# Patient Record
Sex: FEMALE | Race: WHITE | NOT HISPANIC OR LATINO | URBAN - METROPOLITAN AREA
[De-identification: names, ages, dates, MRNs, and addresses within clinical notes are randomized per-mention and may not be internally consistent; named-entity substitution may affect disease eponyms.]

---

## 2018-09-20 DIAGNOSIS — Z12.11 SCREENING FOR COLON CANCER: Primary | ICD-10-CM

## 2018-09-27 ENCOUNTER — LAB REQUISITION (OUTPATIENT)
Dept: LAB | Facility: HOSPITAL | Age: 78
End: 2018-09-27

## 2018-09-27 ENCOUNTER — OUTSIDE FACILITY SERVICE (OUTPATIENT)
Dept: GASTROENTEROLOGY | Facility: CLINIC | Age: 78
End: 2018-09-27

## 2018-09-27 DIAGNOSIS — Z12.11 ENCOUNTER FOR SCREENING FOR MALIGNANT NEOPLASM OF COLON: ICD-10-CM

## 2018-09-27 DIAGNOSIS — Z86.010 HISTORY OF COLONIC POLYPS: ICD-10-CM

## 2018-09-27 DIAGNOSIS — D12.2 BENIGN NEOPLASM OF ASCENDING COLON: ICD-10-CM

## 2018-09-27 DIAGNOSIS — K57.30 DIVERTICULOSIS OF LARGE INTESTINE WITHOUT PERFORATION OR ABSCESS WITHOUT BLEEDING: ICD-10-CM

## 2018-09-27 PROCEDURE — 45385 COLONOSCOPY W/LESION REMOVAL: CPT | Performed by: INTERNAL MEDICINE

## 2018-09-27 PROCEDURE — 45388 COLONOSCOPY W/ABLATION: CPT | Performed by: INTERNAL MEDICINE

## 2018-09-27 PROCEDURE — 88305 TISSUE EXAM BY PATHOLOGIST: CPT | Performed by: INTERNAL MEDICINE

## 2018-09-28 LAB
CYTO UR: NORMAL
LAB AP CASE REPORT: NORMAL
LAB AP CLINICAL INFORMATION: NORMAL
PATH REPORT.FINAL DX SPEC: NORMAL
PATH REPORT.GROSS SPEC: NORMAL

## 2021-03-24 ENCOUNTER — OFFICE VISIT (OUTPATIENT)
Dept: FAMILY MEDICINE CLINIC | Facility: CLINIC | Age: 81
End: 2021-03-24

## 2021-03-24 VITALS
OXYGEN SATURATION: 96 % | DIASTOLIC BLOOD PRESSURE: 74 MMHG | TEMPERATURE: 97.8 F | BODY MASS INDEX: 36.07 KG/M2 | WEIGHT: 203.6 LBS | HEART RATE: 74 BPM | HEIGHT: 63 IN | RESPIRATION RATE: 18 BRPM | SYSTOLIC BLOOD PRESSURE: 118 MMHG

## 2021-03-24 DIAGNOSIS — R60.0 LOCALIZED EDEMA: ICD-10-CM

## 2021-03-24 DIAGNOSIS — G89.29 CHRONIC MIDLINE LOW BACK PAIN WITHOUT SCIATICA: ICD-10-CM

## 2021-03-24 DIAGNOSIS — R06.00 DYSPNEA, UNSPECIFIED TYPE: ICD-10-CM

## 2021-03-24 DIAGNOSIS — I26.99 PULMONARY EMBOLISM, UNSPECIFIED CHRONICITY, UNSPECIFIED PULMONARY EMBOLISM TYPE, UNSPECIFIED WHETHER ACUTE COR PULMONALE PRESENT (HCC): ICD-10-CM

## 2021-03-24 DIAGNOSIS — M54.50 CHRONIC MIDLINE LOW BACK PAIN WITHOUT SCIATICA: Primary | ICD-10-CM

## 2021-03-24 DIAGNOSIS — I10 ESSENTIAL HYPERTENSION: ICD-10-CM

## 2021-03-24 DIAGNOSIS — F51.04 CHRONIC INSOMNIA: ICD-10-CM

## 2021-03-24 DIAGNOSIS — M54.50 CHRONIC MIDLINE LOW BACK PAIN WITHOUT SCIATICA: ICD-10-CM

## 2021-03-24 DIAGNOSIS — N39.0 CHRONIC UTI: ICD-10-CM

## 2021-03-24 DIAGNOSIS — G89.29 CHRONIC MIDLINE LOW BACK PAIN WITHOUT SCIATICA: Primary | ICD-10-CM

## 2021-03-24 PROBLEM — K21.9 GASTROESOPHAGEAL REFLUX DISEASE WITHOUT ESOPHAGITIS: Status: ACTIVE | Noted: 2019-03-20

## 2021-03-24 PROBLEM — G47.00 INSOMNIA: Status: ACTIVE | Noted: 2019-03-20

## 2021-03-24 PROBLEM — G63 NEUROPATHY DUE TO PERIPHERAL VASCULAR DISEASE: Status: ACTIVE | Noted: 2019-03-20

## 2021-03-24 PROBLEM — E78.2 MIXED HYPERLIPIDEMIA: Status: ACTIVE | Noted: 2019-03-20

## 2021-03-24 PROBLEM — I26.09 OTHER PULMONARY EMBOLISM WITH ACUTE COR PULMONALE: Status: ACTIVE | Noted: 2019-03-20

## 2021-03-24 PROBLEM — I73.9 NEUROPATHY DUE TO PERIPHERAL VASCULAR DISEASE: Status: ACTIVE | Noted: 2019-03-20

## 2021-03-24 PROBLEM — F32.0 CURRENT MILD EPISODE OF MAJOR DEPRESSIVE DISORDER: Status: ACTIVE | Noted: 2019-03-20

## 2021-03-24 LAB
BILIRUB BLD-MCNC: NEGATIVE MG/DL
CLARITY, POC: CLEAR
COLOR UR: YELLOW
GLUCOSE UR STRIP-MCNC: NEGATIVE MG/DL
KETONES UR QL: NEGATIVE
LEUKOCYTE EST, POC: NEGATIVE
NITRITE UR-MCNC: NEGATIVE MG/ML
PH UR: 6 [PH] (ref 5–8)
PROT UR STRIP-MCNC: NEGATIVE MG/DL
RBC # UR STRIP: NEGATIVE /UL
SP GR UR: 1.01 (ref 1–1.03)
UROBILINOGEN UR QL: NORMAL

## 2021-03-24 PROCEDURE — 81003 URINALYSIS AUTO W/O SCOPE: CPT | Performed by: FAMILY MEDICINE

## 2021-03-24 PROCEDURE — 99204 OFFICE O/P NEW MOD 45 MIN: CPT | Performed by: FAMILY MEDICINE

## 2021-03-24 RX ORDER — TIZANIDINE 2 MG/1
2 TABLET ORAL EVERY 8 HOURS PRN
Qty: 60 TABLET | Refills: 0 | Status: SHIPPED | OUTPATIENT
Start: 2021-03-24 | End: 2021-06-30

## 2021-03-24 RX ORDER — BRIMONIDINE TARTRATE/TIMOLOL 0.2%-0.5%
DROPS OPHTHALMIC (EYE)
COMMUNITY
Start: 2021-01-17

## 2021-03-24 RX ORDER — OMEPRAZOLE 40 MG/1
40 CAPSULE, DELAYED RELEASE ORAL EVERY MORNING
COMMUNITY
Start: 2021-03-09 | End: 2021-06-25 | Stop reason: SDUPTHER

## 2021-03-24 RX ORDER — SIMVASTATIN 20 MG
TABLET ORAL
COMMUNITY
Start: 2020-11-05 | End: 2022-07-11

## 2021-03-24 RX ORDER — TIZANIDINE 2 MG/1
2 TABLET ORAL EVERY 8 HOURS PRN
Qty: 60 TABLET | Refills: 0 | Status: SHIPPED | OUTPATIENT
Start: 2021-03-24 | End: 2021-03-24 | Stop reason: SDUPTHER

## 2021-03-24 RX ORDER — ZOLPIDEM TARTRATE 10 MG/1
10 TABLET ORAL
COMMUNITY
End: 2021-03-24 | Stop reason: SDUPTHER

## 2021-03-24 RX ORDER — MEMANTINE HYDROCHLORIDE 10 MG/1
TABLET ORAL
COMMUNITY
Start: 2020-12-03

## 2021-03-24 RX ORDER — FLUTICASONE PROPIONATE 50 MCG
2 SPRAY, SUSPENSION (ML) NASAL DAILY
COMMUNITY
Start: 2021-03-04 | End: 2021-05-13 | Stop reason: SDUPTHER

## 2021-03-24 RX ORDER — LATANOPROST 50 UG/ML
SOLUTION/ DROPS OPHTHALMIC
COMMUNITY
Start: 2020-11-18

## 2021-03-24 RX ORDER — ZOLPIDEM TARTRATE 5 MG/1
5 TABLET ORAL NIGHTLY PRN
Qty: 30 TABLET | Refills: 1 | Status: SHIPPED | OUTPATIENT
Start: 2021-03-24 | End: 2021-06-14 | Stop reason: SDUPTHER

## 2021-03-24 RX ORDER — LISINOPRIL 10 MG/1
10 TABLET ORAL DAILY
COMMUNITY
End: 2021-11-10 | Stop reason: SDUPTHER

## 2021-03-24 RX ORDER — POLYMYXIN B SULFATE AND TRIMETHOPRIM 1; 10000 MG/ML; [USP'U]/ML
SOLUTION OPHTHALMIC
COMMUNITY
Start: 2021-01-25

## 2021-03-24 RX ORDER — GALANTAMINE HYDROBROMIDE 8 MG/1
8 TABLET, FILM COATED ORAL
COMMUNITY

## 2021-03-24 RX ORDER — GABAPENTIN 100 MG/1
CAPSULE ORAL
COMMUNITY
Start: 2021-01-25 | End: 2021-06-02 | Stop reason: SDUPTHER

## 2021-03-24 RX ORDER — EZETIMIBE 10 MG/1
TABLET ORAL
COMMUNITY
Start: 2020-11-02 | End: 2021-06-25 | Stop reason: SDUPTHER

## 2021-03-24 RX ORDER — ZOLPIDEM TARTRATE 5 MG/1
5 TABLET ORAL NIGHTLY PRN
Qty: 30 TABLET | Refills: 1 | Status: SHIPPED | OUTPATIENT
Start: 2021-03-24 | End: 2021-03-24 | Stop reason: SDUPTHER

## 2021-03-24 RX ORDER — DULOXETIN HYDROCHLORIDE 60 MG/1
60 CAPSULE, DELAYED RELEASE ORAL DAILY
COMMUNITY
End: 2021-03-29 | Stop reason: SDUPTHER

## 2021-03-24 RX ORDER — PREDNISOLONE ACETATE 10 MG/ML
SUSPENSION/ DROPS OPHTHALMIC
COMMUNITY
Start: 2021-03-10

## 2021-03-24 RX ORDER — NITROFURANTOIN 25; 75 MG/1; MG/1
100 CAPSULE ORAL
COMMUNITY
End: 2021-05-14 | Stop reason: SDUPTHER

## 2021-03-24 NOTE — PROGRESS NOTES
Chief Complaint  Leg Swelling (bilateral ) and Foot Swelling (bilateral )    Subjective          Annalee Melara presents to [facility]  Had been seeing Dr. Rutherford until he passed away.   Switched to Dr. Nobles after that did not work out for her.   Had some swelling in her legs that started 2 weeks ago.  Was given Meloxicam for her back pain just a few days prior.  Ended up seeing the PA at the office and she gave her a few days of lasix.   The legs got better some after that.  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   She should be having a procedure with Dr. Cano.  She does wear her support hose every day.  She has had a little shortness of breath but nothing severe.  She had a PE in the past couple years and was told she had to be on Xarelto for life.  She has been taking it every day.  When they were working up her swelling in her legs, the PA at [facility] ordered a chest x-ray and they told her she had COPD.  She is never smoked but did work in an office with smokers for many years.  She denies any history of kidney disease or thyroid disease.  She has had some decreased urinary output over the past week.  Also needs a refill of her Ambien.  She states she is not taking the 10 mg as listed but is taking a half a pill at night.  She states that the only thing that is ever helped her in the past for sleep but nobody is ever tried anything else or try to get her off of it.  Patient also has never been taken off her statin by any of her previous providers even though she is 80 and never had a heart attack stroke or been diagnosed with any peripheral vascular disease.  She has chronic urinary tract infections and has been taking Macrobid for that for a while.  They tried other medications but that was only thing that worked because of the sensitivities.  Last time she was out of it for 5 days she got a UTI again.  She has chronic low back pain.  That is why the meloxicam was  "tried in the first place.  She is wondering what else she can do.  Is been a long time she since she has had x-rays are done physical therapy.      Objective   Vital Signs:   /74 (BP Location: Left arm, Patient Position: Sitting, Cuff Size: Adult)   Pulse 74   Temp 97.8 °F (36.6 °C) (Temporal)   Resp 18   Ht 160 cm (63\")   Wt 92.4 kg (203 lb 9.6 oz)   SpO2 96%   BMI 36.07 kg/m²     Physical Exam  Vitals and nursing note reviewed.   Constitutional:       General: She is not in acute distress.     Appearance: Normal appearance. She is well-developed.   HENT:      Head: Normocephalic and atraumatic.      Right Ear: Tympanic membrane, ear canal and external ear normal.      Left Ear: Tympanic membrane, ear canal and external ear normal.      Nose: Nose normal.      Mouth/Throat:      Mouth: Mucous membranes are moist.      Pharynx: Oropharynx is clear. No oropharyngeal exudate or posterior oropharyngeal erythema.   Eyes:      Conjunctiva/sclera: Conjunctivae normal.      Pupils: Pupils are equal, round, and reactive to light.   Neck:      Thyroid: No thyromegaly.   Cardiovascular:      Rate and Rhythm: Normal rate and regular rhythm.      Heart sounds: No murmur heard.     Pulmonary:      Effort: Pulmonary effort is normal.      Breath sounds: Normal breath sounds. No wheezing.   Abdominal:      General: Abdomen is flat. Bowel sounds are normal. There is no distension.      Palpations: Abdomen is soft. There is no mass.      Tenderness: There is no abdominal tenderness.      Hernia: No hernia is present.   Musculoskeletal:         General: No swelling. Normal range of motion.      Cervical back: Normal range of motion and neck supple.      Right lower leg: Edema present.      Left lower leg: Edema present.      Comments: Trace bilateral lower extremity edema with tight support hose in place   Lymphadenopathy:      Cervical: No cervical adenopathy.   Skin:     General: Skin is warm and dry.      Capillary " Refill: Capillary refill takes less than 2 seconds.      Findings: No rash.   Neurological:      General: No focal deficit present.      Mental Status: She is alert and oriented to person, place, and time.      Cranial Nerves: No cranial nerve deficit.   Psychiatric:         Mood and Affect: Mood normal.         Behavior: Behavior normal.        Result Review :   The following data was reviewed by: Meredith Lea Kehrer, MD on 03/24/2021:  UA    Urinalysis 3/24/21   Ketones, UA Negative   Leukocytes, UA Negative                     Assessment and Plan    Diagnoses and all orders for this visit:    1. Chronic midline low back pain without sciatica (Primary)  -     Discontinue: tiZANidine (ZANAFLEX) 2 MG tablet; Take 1 tablet by mouth Every 8 (Eight) Hours As Needed for Muscle Spasms (back).  Dispense: 60 tablet; Refill: 0    2. Localized edema  -     BNP  -     CBC & Differential  -     Comprehensive Metabolic Panel  -     TSH  -     POC Urinalysis Dipstick, Automated    3. Chronic insomnia  -     Ambulatory Referral to Sleep Medicine  -     Discontinue: zolpidem (AMBIEN) 5 MG tablet; Take 1 tablet by mouth At Night As Needed for Sleep.  Dispense: 30 tablet; Refill: 1    4. Essential hypertension    5. Chronic UTI    6. Pulmonary embolism, unspecified chronicity, unspecified pulmonary embolism type, unspecified whether acute cor pulmonale present (CMS/HCC)  -     Discontinue: rivaroxaban (XARELTO) 20 MG tablet; Take 1 tablet by mouth Daily.  Dispense: 90 tablet; Refill: 1    7. Dyspnea, unspecified type   -     BNP    Chronic low back pain-we will do a trial of a muscle relaxer  Edema-check labs as listed, continue support hose  Chronic insomnia-patient agrees to see sleep medicine, I agreed to give her 5 mg Ambien only for a couple months until she does that and then I will not prescribe this medication for her long-term.  Hypertension-controlled, continue current medication  Chronic UTI-no evidence of UTI at  present  History of PE-continue Xarelto until records are reviewed.    Follow Up   No follow-ups on file.  Patient was given instructions and counseling regarding her condition or for health maintenance advice. Please see specific information pulled into the AVS if appropriate.

## 2021-03-25 LAB
ALBUMIN SERPL-MCNC: 4.1 G/DL (ref 3.7–4.7)
ALBUMIN/GLOB SERPL: 1.8 {RATIO} (ref 1.2–2.2)
ALP SERPL-CCNC: 109 IU/L (ref 39–117)
ALT SERPL-CCNC: 14 IU/L (ref 0–32)
AST SERPL-CCNC: 22 IU/L (ref 0–40)
BASOPHILS # BLD AUTO: 0.1 X10E3/UL (ref 0–0.2)
BASOPHILS NFR BLD AUTO: 1 %
BILIRUB SERPL-MCNC: 0.3 MG/DL (ref 0–1.2)
BNP SERPL-MCNC: 11.5 PG/ML (ref 0–100)
BUN SERPL-MCNC: 18 MG/DL (ref 8–27)
BUN/CREAT SERPL: 27 (ref 12–28)
CALCIUM SERPL-MCNC: 9.2 MG/DL (ref 8.7–10.3)
CHLORIDE SERPL-SCNC: 102 MMOL/L (ref 96–106)
CO2 SERPL-SCNC: 25 MMOL/L (ref 20–29)
CREAT SERPL-MCNC: 0.66 MG/DL (ref 0.57–1)
EOSINOPHIL # BLD AUTO: 0.3 X10E3/UL (ref 0–0.4)
EOSINOPHIL NFR BLD AUTO: 4 %
ERYTHROCYTE [DISTWIDTH] IN BLOOD BY AUTOMATED COUNT: 12.5 % (ref 11.7–15.4)
GLOBULIN SER CALC-MCNC: 2.3 G/DL (ref 1.5–4.5)
GLUCOSE SERPL-MCNC: 91 MG/DL (ref 65–99)
HCT VFR BLD AUTO: 41.5 % (ref 34–46.6)
HGB BLD-MCNC: 13.6 G/DL (ref 11.1–15.9)
IMM GRANULOCYTES # BLD AUTO: 0 X10E3/UL (ref 0–0.1)
IMM GRANULOCYTES NFR BLD AUTO: 0 %
LYMPHOCYTES # BLD AUTO: 1.8 X10E3/UL (ref 0.7–3.1)
LYMPHOCYTES NFR BLD AUTO: 26 %
MCH RBC QN AUTO: 30.4 PG (ref 26.6–33)
MCHC RBC AUTO-ENTMCNC: 32.8 G/DL (ref 31.5–35.7)
MCV RBC AUTO: 93 FL (ref 79–97)
MONOCYTES # BLD AUTO: 0.7 X10E3/UL (ref 0.1–0.9)
MONOCYTES NFR BLD AUTO: 9 %
NEUTROPHILS # BLD AUTO: 4.3 X10E3/UL (ref 1.4–7)
NEUTROPHILS NFR BLD AUTO: 60 %
PLATELET # BLD AUTO: 304 X10E3/UL (ref 150–450)
POTASSIUM SERPL-SCNC: 4.9 MMOL/L (ref 3.5–5.2)
PROT SERPL-MCNC: 6.4 G/DL (ref 6–8.5)
RBC # BLD AUTO: 4.47 X10E6/UL (ref 3.77–5.28)
SODIUM SERPL-SCNC: 139 MMOL/L (ref 134–144)
TSH SERPL DL<=0.005 MIU/L-ACNC: 0.82 UIU/ML (ref 0.45–4.5)
WBC # BLD AUTO: 7.1 X10E3/UL (ref 3.4–10.8)

## 2021-03-26 ENCOUNTER — TELEPHONE (OUTPATIENT)
Dept: FAMILY MEDICINE CLINIC | Facility: CLINIC | Age: 81
End: 2021-03-26

## 2021-03-26 NOTE — TELEPHONE ENCOUNTER
pt stated the muscle relaxer is wearing off before they are supposed to.  Pt stated she would like to see physical therapy for her back. She doesn't want to see [facility], she has seen them and wasn't satisfied.  Stated she has been wearing a back brace.

## 2021-03-29 DIAGNOSIS — M54.50 CHRONIC MIDLINE LOW BACK PAIN WITHOUT SCIATICA: Primary | ICD-10-CM

## 2021-03-29 DIAGNOSIS — G89.29 CHRONIC MIDLINE LOW BACK PAIN WITHOUT SCIATICA: Primary | ICD-10-CM

## 2021-03-29 DIAGNOSIS — R60.0 LOCALIZED EDEMA: Primary | ICD-10-CM

## 2021-03-29 RX ORDER — FUROSEMIDE 20 MG/1
20 TABLET ORAL DAILY PRN
Qty: 30 TABLET | Refills: 0 | Status: SHIPPED | OUTPATIENT
Start: 2021-03-29 | End: 2021-04-06 | Stop reason: DRUGHIGH

## 2021-03-29 RX ORDER — DULOXETIN HYDROCHLORIDE 60 MG/1
60 CAPSULE, DELAYED RELEASE ORAL DAILY
Qty: 30 CAPSULE | Refills: 2 | Status: SHIPPED | OUTPATIENT
Start: 2021-03-29 | End: 2021-06-30 | Stop reason: DRUGHIGH

## 2021-04-01 ENCOUNTER — TELEPHONE (OUTPATIENT)
Dept: FAMILY MEDICINE CLINIC | Facility: CLINIC | Age: 81
End: 2021-04-01

## 2021-04-01 NOTE — TELEPHONE ENCOUNTER
Pt called in and wanted you to know she had two kyphoplasty and she wanted to know if you wanted to do a xray before you did physical therapy. Please advise..

## 2021-04-01 NOTE — TELEPHONE ENCOUNTER
We can get a lumbar x-ray first.  Without acute worsening of pain or injury, I do not think she is gotten a new compression fracture but with her history it is okay to make sure.  Where does she want to have it done so I can order it?

## 2021-04-02 ENCOUNTER — TELEPHONE (OUTPATIENT)
Dept: FAMILY MEDICINE CLINIC | Facility: CLINIC | Age: 81
End: 2021-04-02

## 2021-04-02 DIAGNOSIS — G89.29 CHRONIC MIDLINE LOW BACK PAIN WITHOUT SCIATICA: Primary | ICD-10-CM

## 2021-04-02 DIAGNOSIS — S32.000S COMPRESSION FRACTURE OF LUMBAR VERTEBRA, UNSPECIFIED LUMBAR VERTEBRAL LEVEL, SEQUELA: ICD-10-CM

## 2021-04-02 DIAGNOSIS — M54.50 CHRONIC MIDLINE LOW BACK PAIN WITHOUT SCIATICA: Primary | ICD-10-CM

## 2021-04-06 ENCOUNTER — TELEPHONE (OUTPATIENT)
Dept: FAMILY MEDICINE CLINIC | Facility: CLINIC | Age: 81
End: 2021-04-06

## 2021-04-06 ENCOUNTER — OFFICE VISIT (OUTPATIENT)
Dept: FAMILY MEDICINE CLINIC | Facility: CLINIC | Age: 81
End: 2021-04-06

## 2021-04-06 VITALS
DIASTOLIC BLOOD PRESSURE: 80 MMHG | HEART RATE: 81 BPM | BODY MASS INDEX: 35.61 KG/M2 | WEIGHT: 201 LBS | OXYGEN SATURATION: 96 % | SYSTOLIC BLOOD PRESSURE: 132 MMHG | TEMPERATURE: 96.7 F

## 2021-04-06 DIAGNOSIS — M54.50 CHRONIC MIDLINE LOW BACK PAIN WITHOUT SCIATICA: ICD-10-CM

## 2021-04-06 DIAGNOSIS — G89.29 CHRONIC MIDLINE LOW BACK PAIN WITHOUT SCIATICA: ICD-10-CM

## 2021-04-06 DIAGNOSIS — I87.8 VENOUS STASIS: ICD-10-CM

## 2021-04-06 DIAGNOSIS — S32.000S COMPRESSION FRACTURE OF LUMBAR VERTEBRA, UNSPECIFIED LUMBAR VERTEBRAL LEVEL, SEQUELA: ICD-10-CM

## 2021-04-06 DIAGNOSIS — R60.0 LOCALIZED EDEMA: Primary | ICD-10-CM

## 2021-04-06 PROCEDURE — 99214 OFFICE O/P EST MOD 30 MIN: CPT | Performed by: FAMILY MEDICINE

## 2021-04-06 RX ORDER — POTASSIUM CHLORIDE 750 MG/1
10 TABLET, FILM COATED, EXTENDED RELEASE ORAL DAILY
Qty: 30 TABLET | Refills: 0 | Status: SHIPPED | OUTPATIENT
Start: 2021-04-06 | End: 2021-04-16 | Stop reason: SDUPTHER

## 2021-04-06 RX ORDER — FUROSEMIDE 40 MG/1
40 TABLET ORAL DAILY
Qty: 30 TABLET | Refills: 0 | Status: SHIPPED | OUTPATIENT
Start: 2021-04-06 | End: 2021-04-16 | Stop reason: SDUPTHER

## 2021-04-06 NOTE — PROGRESS NOTES
Chief Complaint  Edema (Both Legs)    Subjective          Annalee Melara presents to [facility]  Patient presents to follow-up her lower extremity edema.  She is not any better with the 20 mg furosemide.  She states that she was given the support hose by Dr. Cano in January.  She really feels her swelling is still a whole lot worse since taking the meloxicam about a month ago.  I got her records from [facility].  There initial work-up was appropriate and did not reveal any problems either.  Her labs with me show normal thyroid renal function and rule out congestive heart failure.  She denies any side effects from the low-dose of furosemide over the past few days.  She is not wearing her support hose so I can see her legs but she does wear them all the time except when she is in bed.  She knows also to keep her legs up.  She had wraps years ago for lymphedema.  She knows that losing weight will help.  She had an ankle fracture on the right side years ago and also had damage to her left shin and had plastic surgery on that from a large open wound across the tibia.  Her back is still bothering her as we discussed last time.  She has not gotten her x-ray yet.  She had kyphoplasty x2 in the past and was not told that she had any other compressions when she had those done.      Objective   Vital Signs:   /80   Pulse 81   Temp 96.7 °F (35.9 °C)   Wt 91.2 kg (201 lb)   SpO2 96%   BMI 35.61 kg/m²     Physical Exam  Vitals and nursing note reviewed.   Constitutional:       General: She is not in acute distress.  HENT:      Head: Normocephalic and atraumatic.      Right Ear: External ear normal.      Left Ear: External ear normal.   Eyes:      Conjunctiva/sclera: Conjunctivae normal.      Pupils: Pupils are equal, round, and reactive to light.   Pulmonary:      Effort: No respiratory distress.   Musculoskeletal:      Right lower leg: Edema present.      Left lower leg: Edema  present.      Comments: Mild chronic venous stasis changes bilateral lower extremities.  She does have scar on bilateral lower extremities.  1+ edema bilaterally to about half-way up the calf on the left and two thirds of the way on the right.  Good peripheral pulses on both legs and feet.  Hose not present today so I could see this.    Mild diffuse lower lumbar paraspinous muscle tenderness.   Neurological:      Mental Status: She is alert and oriented to person, place, and time.      Sensory: No sensory deficit.      Motor: No weakness.      Coordination: Coordination normal.      Gait: Gait normal.      Deep Tendon Reflexes: Reflexes normal.   Psychiatric:         Mood and Affect: Mood normal.         Behavior: Behavior normal.        Result Review :            TSH (03/24/2021 15:15)  Comprehensive Metabolic Panel (03/24/2021 15:15)  CBC & Differential (03/24/2021 15:15)  BNP (03/24/2021 15:15)  POC Urinalysis Dipstick, Automated (03/24/2021 15:11)  EXTERNAL MEDICAL RECORDS - SCAN - [facility] (03/26/2021)       Assessment and Plan    Diagnoses and all orders for this visit:    1. Localized edema (Primary)  -     furosemide (Lasix) 40 MG tablet; Take 1 tablet by mouth Daily.  Dispense: 30 tablet; Refill: 0  -     potassium chloride 10 MEQ CR tablet; Take 1 tablet by mouth Daily.  Dispense: 30 tablet; Refill: 0    2. Compression fracture of lumbar vertebra, unspecified lumbar vertebral level, sequela    3. Venous stasis    4. Chronic midline low back pain without sciatica    Localized edema-likely a combination of lymphatic and venous insufficiency contributed to by her obesity, will do a trial of a higher dose of Lasix since her renal function is good and she sees vascular soon  Chronic back pain, history of compression fractures-we will get her back x-ray before she starts physical therapy      Follow Up   Return in about 10 days (around 4/16/2021) for Recheck swelling.  Patient was given  instructions and counseling regarding her condition or for health maintenance advice. Please see specific information pulled into the AVS if appropriate.

## 2021-04-06 NOTE — TELEPHONE ENCOUNTER
Caller: Annalee Melara    Relationship: Self    Best call back number: [phone]     Caller requesting test results: PATIENT     What test was performed: BONE DENSITY     When was the test performed: 3 WEEKS AGO TODAY     Where was the test performed: [facility]     Additional notes: PATIENT WANTS TO KNOW IF THE RESULTS ARE BACK SHE HASEN'T HEARD ANYTHING .   PLEASE CALL HER .

## 2021-04-06 NOTE — PATIENT INSTRUCTIONS
Get x-rays soon as possible.  Make sure that when you are not walking your feet are elevated and keep your support hose on when not in bed at night.  Make sure to call if you have any unusual muscle cramps.

## 2021-04-06 NOTE — TELEPHONE ENCOUNTER
Called pt vinnie andrse to call back, I did not see an order or referral in for a dexa scan. Called to clarify with patient

## 2021-04-07 NOTE — TELEPHONE ENCOUNTER
Called pt, we did not order this test. She said dr garcia ordered it. I advised her to call his office for results.

## 2021-04-09 DIAGNOSIS — S22.000A COMPRESSION FRACTURE OF BODY OF THORACIC VERTEBRA (HCC): ICD-10-CM

## 2021-04-09 DIAGNOSIS — G89.29 CHRONIC MIDLINE LOW BACK PAIN WITHOUT SCIATICA: Primary | ICD-10-CM

## 2021-04-09 DIAGNOSIS — S32.000S COMPRESSION FRACTURE OF LUMBAR VERTEBRA, UNSPECIFIED LUMBAR VERTEBRAL LEVEL, SEQUELA: ICD-10-CM

## 2021-04-09 DIAGNOSIS — M54.50 CHRONIC MIDLINE LOW BACK PAIN WITHOUT SCIATICA: Primary | ICD-10-CM

## 2021-04-15 ENCOUNTER — OFFICE VISIT (OUTPATIENT)
Dept: FAMILY MEDICINE CLINIC | Facility: CLINIC | Age: 81
End: 2021-04-15

## 2021-04-15 VITALS
HEART RATE: 96 BPM | SYSTOLIC BLOOD PRESSURE: 110 MMHG | BODY MASS INDEX: 36.43 KG/M2 | HEIGHT: 63 IN | OXYGEN SATURATION: 98 % | DIASTOLIC BLOOD PRESSURE: 76 MMHG | WEIGHT: 205.6 LBS | TEMPERATURE: 97.1 F

## 2021-04-15 DIAGNOSIS — R60.0 LOCALIZED EDEMA: Primary | ICD-10-CM

## 2021-04-15 DIAGNOSIS — I87.8 VENOUS STASIS: ICD-10-CM

## 2021-04-15 DIAGNOSIS — M54.50 CHRONIC MIDLINE LOW BACK PAIN WITHOUT SCIATICA: Chronic | ICD-10-CM

## 2021-04-15 DIAGNOSIS — S32.000S COMPRESSION FRACTURE OF LUMBAR VERTEBRA, UNSPECIFIED LUMBAR VERTEBRAL LEVEL, SEQUELA: Chronic | ICD-10-CM

## 2021-04-15 DIAGNOSIS — R06.00 DYSPNEA, UNSPECIFIED TYPE: ICD-10-CM

## 2021-04-15 DIAGNOSIS — I10 ESSENTIAL HYPERTENSION: Chronic | ICD-10-CM

## 2021-04-15 DIAGNOSIS — G89.29 CHRONIC MIDLINE LOW BACK PAIN WITHOUT SCIATICA: Chronic | ICD-10-CM

## 2021-04-15 DIAGNOSIS — S22.000A COMPRESSION FRACTURE OF BODY OF THORACIC VERTEBRA (HCC): Chronic | ICD-10-CM

## 2021-04-15 PROCEDURE — 99214 OFFICE O/P EST MOD 30 MIN: CPT | Performed by: FAMILY MEDICINE

## 2021-04-16 DIAGNOSIS — G89.29 CHRONIC MIDLINE LOW BACK PAIN WITHOUT SCIATICA: ICD-10-CM

## 2021-04-16 DIAGNOSIS — R60.0 LOCALIZED EDEMA: ICD-10-CM

## 2021-04-16 DIAGNOSIS — S32.000S COMPRESSION FRACTURE OF LUMBAR VERTEBRA, UNSPECIFIED LUMBAR VERTEBRAL LEVEL, SEQUELA: ICD-10-CM

## 2021-04-16 DIAGNOSIS — S22.000A COMPRESSION FRACTURE OF BODY OF THORACIC VERTEBRA (HCC): ICD-10-CM

## 2021-04-16 DIAGNOSIS — M54.50 CHRONIC MIDLINE LOW BACK PAIN WITHOUT SCIATICA: ICD-10-CM

## 2021-04-16 LAB
BUN SERPL-MCNC: 19 MG/DL (ref 8–27)
BUN/CREAT SERPL: 22 (ref 12–28)
CALCIUM SERPL-MCNC: 9.2 MG/DL (ref 8.7–10.3)
CHLORIDE SERPL-SCNC: 102 MMOL/L (ref 96–106)
CO2 SERPL-SCNC: 25 MMOL/L (ref 20–29)
CREAT SERPL-MCNC: 0.85 MG/DL (ref 0.57–1)
GLUCOSE SERPL-MCNC: 118 MG/DL (ref 65–99)
POTASSIUM SERPL-SCNC: 4.4 MMOL/L (ref 3.5–5.2)
SODIUM SERPL-SCNC: 141 MMOL/L (ref 134–144)

## 2021-04-16 RX ORDER — FUROSEMIDE 40 MG/1
40 TABLET ORAL DAILY
Qty: 30 TABLET | Refills: 0 | Status: SHIPPED | OUTPATIENT
Start: 2021-04-16 | End: 2021-06-02 | Stop reason: SINTOL

## 2021-04-16 RX ORDER — SPIRONOLACTONE 25 MG/1
25 TABLET ORAL DAILY
Qty: 30 TABLET | Refills: 0 | Status: SHIPPED | OUTPATIENT
Start: 2021-04-16 | End: 2021-06-02 | Stop reason: SINTOL

## 2021-04-16 RX ORDER — POTASSIUM CHLORIDE 750 MG/1
10 TABLET, FILM COATED, EXTENDED RELEASE ORAL DAILY
Qty: 30 TABLET | Refills: 0 | Status: SHIPPED | OUTPATIENT
Start: 2021-04-16 | End: 2021-06-02 | Stop reason: SINTOL

## 2021-04-21 ENCOUNTER — TELEPHONE (OUTPATIENT)
Dept: FAMILY MEDICINE CLINIC | Facility: CLINIC | Age: 81
End: 2021-04-21

## 2021-04-21 NOTE — TELEPHONE ENCOUNTER
Caller: Annalee Melara    Relationship: Self    Best call back number: [phone]    Caller requesting test results: PATIENT     What test was performed: CT OF SPINE    When was the test performed: 04/20    Where was the test performed[facility]    Additional notes: PATIENT STATES SHE HAD A CT SCAN OF HER BACK YESTERDAY AT [facility] . PLEASE REQUEST CT RESULTS ASAP

## 2021-04-23 ENCOUNTER — TELEPHONE (OUTPATIENT)
Dept: FAMILY MEDICINE CLINIC | Facility: CLINIC | Age: 81
End: 2021-04-23

## 2021-04-23 NOTE — TELEPHONE ENCOUNTER
Caller: Annalee Melara    Relationship: Self    Best call back number: [phone]    Caller requesting test results: CT SCAN    What test was performed: CT SCAN    When was the test performed: 04/16/2021    Where was the test performed: [facility]     Additional notes: PATIENT CALLING IN REGARDS TO GET TEST FOR THE BONE DENSITY TEST AND CT SCAN .

## 2021-04-23 NOTE — TELEPHONE ENCOUNTER
Caller: Annalee Melara    Relationship: Self    Best call back number: [phone]    What orders are you requesting (i.e. lab or imaging): STEROID SHOTS    Where will you receive your lab/imaging services: [facility] [place]    Additional notes: PATIENT STATES SHE BROKE HER HIP OVER THE WEEKEND AND IS BED RIDDEN.  SHE WANTS TO KNOW IF SHOTS CAN BE ORDERED FOR HER BACK INSTEAD OF THE PHYSICAL THERAPY THAT WAS DISCUSSED.

## 2021-04-23 NOTE — TELEPHONE ENCOUNTER
Pt informed that she was notified of her CT results on 4/19/21.  I gave her the results again.  Pt stated that [facility]  Have tried to request records but have not gotten through.  Will keep trying to request records for dexa scan

## 2021-04-26 NOTE — TELEPHONE ENCOUNTER
I have spoke to pt and she stated becacuse of her broken hip she cant do Physical Therapy.  Pt is currently in [facility], and is wondering if she can do steroid injections for her back instead of the Physical Therapy.  Please advise

## 2021-04-26 NOTE — TELEPHONE ENCOUNTER
While she is under their care at [facility], it is up to them to do any orders.  They are currently managing her pain and doing her therapy for her hip.  Her back can be addressed when she leaves there or if they feel it is necessary to do it to help with her recovery for her hip.  It is up to them at this point.

## 2021-04-27 ENCOUNTER — APPOINTMENT (OUTPATIENT)
Dept: SLEEP MEDICINE | Facility: HOSPITAL | Age: 81
End: 2021-04-27

## 2021-04-27 ENCOUNTER — TELEPHONE (OUTPATIENT)
Dept: FAMILY MEDICINE CLINIC | Facility: CLINIC | Age: 81
End: 2021-04-27

## 2021-04-27 NOTE — TELEPHONE ENCOUNTER
I have never prescribed it for her.  It was given to me in her history.  I believe she said she was on it for chronic UTI prophylaxis.

## 2021-04-27 NOTE — TELEPHONE ENCOUNTER
Florinda from [facility] in [place] called and said patient stated she needs to be on macrobid (the hospital had dc the med) they want to know if she needs to be on it for frequent/reocurring uti's or if it really does need to be dc'd

## 2021-05-06 ENCOUNTER — TELEPHONE (OUTPATIENT)
Dept: FAMILY MEDICINE CLINIC | Facility: CLINIC | Age: 81
End: 2021-05-06

## 2021-05-06 NOTE — TELEPHONE ENCOUNTER
Caller: Annalee Melara    Relationship to patient: Self    Best call back number: [phone]      Patient is needing:  PATIENT WOULD LIKE A CALL BACK FROM DR. KEHRER . PATIENT  WANTED TO LET HER KNOW SHE FEEL AND BROKE HER HIP AND IS STILL IN A REHAB FACILITY AND WANTED TO KNOW IF DR. KEHRER COULD HELP HER GET IN SOON TO SEE . PLEASE CALL AND ADVISE

## 2021-05-06 NOTE — TELEPHONE ENCOUNTER
Called pt back, she is getting dc today from rehab. She said she was told she needed ablations on her legs. Was supposed to see dr szymanski but missed appt due to being in rehab, I did give her dr fiore number so she can contact him.

## 2021-05-07 ENCOUNTER — TELEPHONE (OUTPATIENT)
Dept: FAMILY MEDICINE CLINIC | Facility: CLINIC | Age: 81
End: 2021-05-07

## 2021-05-07 NOTE — TELEPHONE ENCOUNTER
Valery from [facility] called wants to know if she can have a verbal for patient to have OT and PT

## 2021-05-13 ENCOUNTER — OFFICE VISIT (OUTPATIENT)
Dept: FAMILY MEDICINE CLINIC | Facility: CLINIC | Age: 81
End: 2021-05-13

## 2021-05-13 ENCOUNTER — TELEPHONE (OUTPATIENT)
Dept: FAMILY MEDICINE CLINIC | Facility: CLINIC | Age: 81
End: 2021-05-13

## 2021-05-13 VITALS
BODY MASS INDEX: 38.16 KG/M2 | TEMPERATURE: 98.4 F | DIASTOLIC BLOOD PRESSURE: 68 MMHG | SYSTOLIC BLOOD PRESSURE: 118 MMHG | OXYGEN SATURATION: 98 % | HEART RATE: 83 BPM | WEIGHT: 215.4 LBS | HEIGHT: 63 IN

## 2021-05-13 DIAGNOSIS — J31.0 CHRONIC RHINITIS: ICD-10-CM

## 2021-05-13 DIAGNOSIS — I26.99 PULMONARY EMBOLISM, UNSPECIFIED CHRONICITY, UNSPECIFIED PULMONARY EMBOLISM TYPE, UNSPECIFIED WHETHER ACUTE COR PULMONALE PRESENT (HCC): ICD-10-CM

## 2021-05-13 DIAGNOSIS — R63.5 WEIGHT GAIN: ICD-10-CM

## 2021-05-13 DIAGNOSIS — S72.142S CLOSED COMMINUTED INTERTROCHANTERIC FRACTURE OF LEFT FEMUR, SEQUELA: Primary | ICD-10-CM

## 2021-05-13 DIAGNOSIS — I10 ESSENTIAL HYPERTENSION: ICD-10-CM

## 2021-05-13 DIAGNOSIS — R60.0 LOCALIZED EDEMA: ICD-10-CM

## 2021-05-13 DIAGNOSIS — Z92.241 HISTORY OF RECENT STEROID USE: ICD-10-CM

## 2021-05-13 DIAGNOSIS — N39.0 CHRONIC UTI: ICD-10-CM

## 2021-05-13 DIAGNOSIS — G31.84 MINIMAL COGNITIVE IMPAIRMENT: ICD-10-CM
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
## 2021-05-13 PROBLEM — S72.142A CLOSED INTERTROCHANTERIC FRACTURE OF LEFT HIP, INITIAL ENCOUNTER (HCC): Status: ACTIVE | Noted: 2021-04-17

## 2021-05-13 PROCEDURE — 99214 OFFICE O/P EST MOD 30 MIN: CPT | Performed by: FAMILY MEDICINE

## 2021-05-13 RX ORDER — FLUTICASONE PROPIONATE 50 MCG
2 SPRAY, SUSPENSION (ML) NASAL DAILY
Qty: 16 G | Refills: 2 | Status: SHIPPED | OUTPATIENT
Start: 2021-05-13

## 2021-05-13 NOTE — PROGRESS NOTES
"Chief Complaint  broke hip (Follow Up) and over eating    Subjective          Annalee Melara presents to [facility]  Here for follow up of nursing home stay after left hip fracture with ORIF.  She bumped into glass at a store because of her decrease in vision and fell directly onto her left hip.  Happened at [facility] and had to go to hospital via EMS.  She had a open reduction and internal fixation with a screw and did well without any complications.  Saw ortho already.  Has a lot of pain in her feet and legs from the swelling.  The gabapentin has been helping with her pain.  She does not take the hydrocodone that she was given.  Sees Dr. Escobar tomorrow.  She is very concerned about her increase in appetite and swelling.  She is gained 10 pounds since I last saw her.  On review of her MAR from the nursing home I saw prednisone and patient was not sure about why she was given it or if she was still taking it.  She agreed to check her medication when she got home and thinks it may have had something to do with her COPD.  She was discharged from [facility] 1 week ago.      Objective   Vital Signs:   /68   Pulse 83   Temp 98.4 °F (36.9 °C)   Ht 160 cm (63\")   Wt 97.7 kg (215 lb 6.4 oz)   SpO2 98%   BMI 38.16 kg/m²     Physical Exam  Constitutional:       General: She is not in acute distress.     Appearance: Normal appearance. She is well-developed.   HENT:      Head: Normocephalic and atraumatic.      Right Ear: External ear normal.      Left Ear: External ear normal.   Neck:      Thyroid: No thyromegaly.   Cardiovascular:      Rate and Rhythm: Normal rate and regular rhythm.      Heart sounds: No murmur heard.     Pulmonary:      Effort: Pulmonary effort is normal.      Breath sounds: Normal breath sounds. No wheezing.   Abdominal:      General: Bowel sounds are normal.      Palpations: Abdomen is soft.      Tenderness: There is no abdominal tenderness.   Musculoskeletal:       "   General: Normal range of motion.      Cervical back: Neck supple.      Right lower leg: Edema present.      Left lower leg: Edema present.   Lymphadenopathy:      Cervical: No cervical adenopathy.   Skin:     General: Skin is warm and dry.      Comments: Steri-Strips over incision on left hip.  Incision is clean dry intact without any warmth or swelling.   Neurological:      Mental Status: She is alert and oriented to person, place, and time.      Comments: Gait slow but steady with walker   Psychiatric:         Mood and Affect: Mood normal.         Behavior: Behavior normal.        Result Review :            EXTERNAL MEDICAL RECORDS - SCAN - [facility] (05/04/2021)       Assessment and Plan    Diagnoses and all orders for this visit:    1. Closed comminuted intertrochanteric fracture of left femur, sequela (Primary)    2. Localized edema  -     CBC & Differential  -     Comprehensive Metabolic Panel    3. History of recent steroid use  -     CBC & Differential  -     Comprehensive Metabolic Panel    4. Pulmonary embolism, unspecified chronicity, unspecified pulmonary embolism type, unspecified whether acute cor pulmonale present (CMS/HCC)    5. Chronic UTI    6. Essential hypertension    7. Chronic rhinitis  -     fluticasone (FLONASE) 50 MCG/ACT nasal spray; 2 sprays into the nostril(s) as directed by provider Daily.  Dispense: 16 g; Refill: 2    8. Weight gain    9. Minimal cognitive impairment  Comments:  Sees Dr. Huffman at [facility]    Left hip fracture-healing well, up-to-date with follow-up with Ortho  Localized edema from venous stasis and multiple medical problems-worsened likely due to recent steroid use we will check labs  Chronic rhinitis-we will give the patient a prescription for Flonase since that really helped her at the nursing home  Follow-up pending results of labs.    Follow Up   No follow-ups on file.  Patient was given instructions and counseling regarding her condition or for  health maintenance advice. Please see specific information pulled into the AVS if appropriate.

## 2021-05-14 ENCOUNTER — TELEPHONE (OUTPATIENT)
Dept: FAMILY MEDICINE CLINIC | Facility: CLINIC | Age: 81
End: 2021-05-14

## 2021-05-14 DIAGNOSIS — N39.0 CHRONIC UTI: Primary | ICD-10-CM

## 2021-05-14 LAB
ALBUMIN SERPL-MCNC: 3.5 G/DL (ref 3.7–4.7)
ALBUMIN/GLOB SERPL: 1.6 {RATIO} (ref 1.2–2.2)
ALP SERPL-CCNC: 252 IU/L (ref 39–117)
ALT SERPL-CCNC: 27 IU/L (ref 0–32)
AST SERPL-CCNC: 23 IU/L (ref 0–40)
BASOPHILS # BLD AUTO: 0.1 X10E3/UL (ref 0–0.2)
BASOPHILS NFR BLD AUTO: 1 %
BILIRUB SERPL-MCNC: 0.3 MG/DL (ref 0–1.2)
BUN SERPL-MCNC: 13 MG/DL (ref 8–27)
BUN/CREAT SERPL: 21 (ref 12–28)
CALCIUM SERPL-MCNC: 9.3 MG/DL (ref 8.7–10.3)
CHLORIDE SERPL-SCNC: 104 MMOL/L (ref 96–106)
CO2 SERPL-SCNC: 28 MMOL/L (ref 20–29)
CREAT SERPL-MCNC: 0.63 MG/DL (ref 0.57–1)
EOSINOPHIL # BLD AUTO: 0.4 X10E3/UL (ref 0–0.4)
EOSINOPHIL NFR BLD AUTO: 6 %
ERYTHROCYTE [DISTWIDTH] IN BLOOD BY AUTOMATED COUNT: 13.3 % (ref 11.7–15.4)
GLOBULIN SER CALC-MCNC: 2.2 G/DL (ref 1.5–4.5)
GLUCOSE SERPL-MCNC: 107 MG/DL (ref 65–99)
HCT VFR BLD AUTO: 38.3 % (ref 34–46.6)
HGB BLD-MCNC: 12.5 G/DL (ref 11.1–15.9)
IMM GRANULOCYTES # BLD AUTO: 0 X10E3/UL (ref 0–0.1)
IMM GRANULOCYTES NFR BLD AUTO: 0 %
LYMPHOCYTES # BLD AUTO: 1.5 X10E3/UL (ref 0.7–3.1)
LYMPHOCYTES NFR BLD AUTO: 20 %
MCH RBC QN AUTO: 29.9 PG (ref 26.6–33)
MCHC RBC AUTO-ENTMCNC: 32.6 G/DL (ref 31.5–35.7)
MCV RBC AUTO: 92 FL (ref 79–97)
MONOCYTES # BLD AUTO: 0.7 X10E3/UL (ref 0.1–0.9)
MONOCYTES NFR BLD AUTO: 9 %
NEUTROPHILS # BLD AUTO: 4.8 X10E3/UL (ref 1.4–7)
NEUTROPHILS NFR BLD AUTO: 64 %
PLATELET # BLD AUTO: 247 X10E3/UL (ref 150–450)
POTASSIUM SERPL-SCNC: 4.6 MMOL/L (ref 3.5–5.2)
PROT SERPL-MCNC: 5.7 G/DL (ref 6–8.5)
RBC # BLD AUTO: 4.18 X10E6/UL (ref 3.77–5.28)
SODIUM SERPL-SCNC: 140 MMOL/L (ref 134–144)
WBC # BLD AUTO: 7.4 X10E3/UL (ref 3.4–10.8)

## 2021-05-14 RX ORDER — NITROFURANTOIN 25; 75 MG/1; MG/1
100 CAPSULE ORAL DAILY
Qty: 90 CAPSULE | Refills: 1 | Status: SHIPPED | OUTPATIENT
Start: 2021-05-14 | End: 2021-06-29 | Stop reason: SDUPTHER

## 2021-05-14 NOTE — TELEPHONE ENCOUNTER
Nerissa with [facility] called and stated that they were not able to get out to do OT with pt this week.  She asked if they could go out next week and do her OT. I told her this was fine.

## 2021-05-14 NOTE — TELEPHONE ENCOUNTER
NAZ FROM [facility] PT WENT TO SEE THE PATIENT TODAY. SHE IS REFUSING ALL SERVICES. NAZ SAID SHE WAS VERY AGGRESSIVE AND RUDE. THE ONLY THING SHE WANTS HELP WITH IS LEARN HOW TO DRIVE AGAIN. THEY WILL NOT BE GOING OUT TO SEE HER MORE THAN LIKELY.    PLEASE ADVISE  NAZ  [phone]

## 2021-05-26 ENCOUNTER — TELEPHONE (OUTPATIENT)
Dept: FAMILY MEDICINE CLINIC | Facility: CLINIC | Age: 81
End: 2021-05-26

## 2021-05-26 ENCOUNTER — OFFICE VISIT (OUTPATIENT)
Dept: FAMILY MEDICINE CLINIC | Facility: CLINIC | Age: 81
End: 2021-05-26

## 2021-05-26 VITALS
HEIGHT: 63 IN | WEIGHT: 218 LBS | OXYGEN SATURATION: 96 % | TEMPERATURE: 97.8 F | HEART RATE: 76 BPM | BODY MASS INDEX: 38.62 KG/M2 | DIASTOLIC BLOOD PRESSURE: 78 MMHG | SYSTOLIC BLOOD PRESSURE: 124 MMHG

## 2021-05-26 DIAGNOSIS — I87.2 VENOUS STASIS DERMATITIS OF BOTH LOWER EXTREMITIES: Primary | ICD-10-CM

## 2021-05-26 DIAGNOSIS — R60.0 LOCALIZED EDEMA: ICD-10-CM

## 2021-05-26 LAB
BASOPHILS # BLD AUTO: 0.05 10*3/MM3 (ref 0–0.2)
BASOPHILS NFR BLD AUTO: 0.8 % (ref 0–1.5)
BUN SERPL-MCNC: 13 MG/DL (ref 8–23)
BUN/CREAT SERPL: 20 (ref 7–25)
CALCIUM SERPL-MCNC: 9.6 MG/DL (ref 8.6–10.5)
CHLORIDE SERPL-SCNC: 103 MMOL/L (ref 98–107)
CO2 SERPL-SCNC: 28.3 MMOL/L (ref 22–29)
CREAT SERPL-MCNC: 0.65 MG/DL (ref 0.57–1)
CRP SERPL-MCNC: 1.56 MG/DL (ref 0–0.5)
EOSINOPHIL # BLD AUTO: 0.19 10*3/MM3 (ref 0–0.4)
EOSINOPHIL NFR BLD AUTO: 3.2 % (ref 0.3–6.2)
ERYTHROCYTE [DISTWIDTH] IN BLOOD BY AUTOMATED COUNT: 13.1 % (ref 12.3–15.4)
GLUCOSE SERPL-MCNC: 114 MG/DL (ref 65–99)
HCT VFR BLD AUTO: 36.1 % (ref 34–46.6)
HGB BLD-MCNC: 12 G/DL (ref 12–15.9)
IMM GRANULOCYTES # BLD AUTO: 0.01 10*3/MM3 (ref 0–0.05)
IMM GRANULOCYTES NFR BLD AUTO: 0.2 % (ref 0–0.5)
LYMPHOCYTES # BLD AUTO: 2.15 10*3/MM3 (ref 0.7–3.1)
LYMPHOCYTES NFR BLD AUTO: 35.8 % (ref 19.6–45.3)
MCH RBC QN AUTO: 30 PG (ref 26.6–33)
MCHC RBC AUTO-ENTMCNC: 33.2 G/DL (ref 31.5–35.7)
MCV RBC AUTO: 90.3 FL (ref 79–97)
MONOCYTES # BLD AUTO: 0.77 10*3/MM3 (ref 0.1–0.9)
MONOCYTES NFR BLD AUTO: 12.8 % (ref 5–12)
NEUTROPHILS # BLD AUTO: 2.84 10*3/MM3 (ref 1.7–7)
NEUTROPHILS NFR BLD AUTO: 47.2 % (ref 42.7–76)
NRBC BLD AUTO-RTO: 0 /100 WBC (ref 0–0.2)
PLATELET # BLD AUTO: 470 10*3/MM3 (ref 140–450)
POTASSIUM SERPL-SCNC: 5.1 MMOL/L (ref 3.5–5.2)
RBC # BLD AUTO: 4 10*6/MM3 (ref 3.77–5.28)
SODIUM SERPL-SCNC: 138 MMOL/L (ref 136–145)
WBC # BLD AUTO: 6.01 10*3/MM3 (ref 3.4–10.8)

## 2021-05-26 PROCEDURE — 99214 OFFICE O/P EST MOD 30 MIN: CPT | Performed by: FAMILY MEDICINE

## 2021-05-26 NOTE — TELEPHONE ENCOUNTER
----- Message from Meredith Lea Kehrer, MD sent at 5/26/2021 11:03 AM EDT -----  Please call her home health and see if they can send a skin care nurse for Unna boots until she can get in for a second opinion with Dr. Torres for vascular.

## 2021-05-26 NOTE — PROGRESS NOTES
"Chief Complaint  Edema (Swollen on both legs, with blisters)    Subjective          Annalee Melara presents to [facility]  Patient went for her vascular follow-up last week and that Unna boots that were placed were so tight that within a couple hours she was in severe pain and called the physician friend of hers that advised her to remove them then.  She states that her toes and feet still had circulation but the pain was unbearable.  She has gabapentin that she still takes as needed for pain and it helps.  She has been trying to keep her feet up as much as possible since then.  She would really like another opinion.  She has not had any open sores or breakdown of the skin but they are very blistered.  She has not had any fever or chills.  She was told by home health not to try to put her support hose on yesterday.      Objective   Vital Signs:   /78   Pulse 76   Temp 97.8 °F (36.6 °C)   Ht 160 cm (63\")   Wt 98.9 kg (218 lb)   SpO2 96%   BMI 38.62 kg/m²     Physical Exam  Constitutional:       General: She is not in acute distress.     Appearance: Normal appearance. She is well-developed. She is obese.   HENT:      Head: Normocephalic and atraumatic.      Right Ear: External ear normal.      Left Ear: External ear normal.   Eyes:      Conjunctiva/sclera: Conjunctivae normal.      Pupils: Pupils are equal, round, and reactive to light.   Neck:      Thyroid: No thyromegaly.   Pulmonary:      Effort: Pulmonary effort is normal.   Musculoskeletal:      Right lower leg: Edema present.      Left lower leg: Edema present.      Comments: 3+ edema bilateral lower extremities does not extend all the way down into forefoot   Skin:     Findings: Erythema present.      Comments: Bilateral lower extremities with tense edema causing erythema and blistering.  No open skin.  No weeping.  There is no warmth or infection.   Neurological:      Mental Status: She is alert and oriented to " person, place, and time.   Psychiatric:         Mood and Affect: Mood normal.         Behavior: Behavior normal.        Result Review :                 Assessment and Plan    Diagnoses and all orders for this visit:    1. Venous stasis dermatitis of both lower extremities (Primary)  -     Ambulatory Referral to Vascular Surgery  -     Basic Metabolic Panel  -     CBC & Differential  -     C-reactive Protein    2. Localized edema  -     Ambulatory Referral to Vascular Surgery  -     Basic Metabolic Panel  -     CBC & Differential  -     C-reactive Protein    Venous stasis dermatitis-patient again advised the importance of compression to decrease the risk of skin breakdown and cellulitis, will get her referral for another opinion, will check labs to look for signs of infection, will see if home health can do Unna boots.    Follow Up   No follow-ups on file.  Patient was given instructions and counseling regarding her condition or for health maintenance advice. Please see specific information pulled into the AVS if appropriate.

## 2021-05-26 NOTE — TELEPHONE ENCOUNTER
I called [facility] and they stated they dont have a wound care nurse. Provider notified and she stated the pt needed to go to the wound clinic.  Pt is agreeable to go to the wound care clinic.  Please enter referral.

## 2021-05-27 ENCOUNTER — TELEPHONE (OUTPATIENT)
Dept: FAMILY MEDICINE CLINIC | Facility: CLINIC | Age: 81
End: 2021-05-27

## 2021-06-02 ENCOUNTER — OFFICE VISIT (OUTPATIENT)
Dept: FAMILY MEDICINE CLINIC | Facility: CLINIC | Age: 81
End: 2021-06-02

## 2021-06-02 VITALS
SYSTOLIC BLOOD PRESSURE: 112 MMHG | DIASTOLIC BLOOD PRESSURE: 68 MMHG | TEMPERATURE: 98 F | WEIGHT: 213.8 LBS | BODY MASS INDEX: 37.88 KG/M2 | HEIGHT: 63 IN | OXYGEN SATURATION: 95 % | HEART RATE: 80 BPM

## 2021-06-02 DIAGNOSIS — R60.0 LOCALIZED EDEMA: ICD-10-CM

## 2021-06-02 DIAGNOSIS — G62.9 NEUROPATHY: ICD-10-CM

## 2021-06-02 DIAGNOSIS — S72.142S CLOSED COMMINUTED INTERTROCHANTERIC FRACTURE OF LEFT FEMUR, SEQUELA: ICD-10-CM

## 2021-06-02 DIAGNOSIS — I87.2 VENOUS STASIS DERMATITIS OF BOTH LOWER EXTREMITIES: Primary | ICD-10-CM

## 2021-06-02 PROCEDURE — 99214 OFFICE O/P EST MOD 30 MIN: CPT | Performed by: FAMILY MEDICINE

## 2021-06-02 RX ORDER — GABAPENTIN 100 MG/1
100 CAPSULE ORAL 4 TIMES DAILY PRN
Qty: 120 CAPSULE | Refills: 2 | Status: SHIPPED | OUTPATIENT
Start: 2021-06-02 | End: 2021-06-09 | Stop reason: SDUPTHER

## 2021-06-02 NOTE — PROGRESS NOTES
"Chief Complaint  Leg Pain    Subjective          Annalee Melara presents to [facility]  Patient presents very tearful about her leg pain and swelling.  She could not get an appointment with the wound clinic because she did not have any open sores last week.  Now, however, she is having weeping from her blisters and has been getting some hard knots where she has blisters.  She is sleeping in the recliner and keeping her feet up as much as possible.  Because of the pain, she has not been able to get her support hose on again this week.  She has not had any fever or chills.  The gabapentin still helps with the pain.  She is on a waiting list to see Dr. Daniels.  She had to stop her diuretics because they made her bladder much worse and she is already incontinent.  They did not make any difference with the swelling anyway.  Her weight is down a few pounds from last week.          Objective   Vital Signs:   /68   Pulse 80   Temp 98 °F (36.7 °C)   Ht 160 cm (63\")   Wt 97 kg (213 lb 12.8 oz)   SpO2 95%   BMI 37.87 kg/m²     Physical Exam  Constitutional:       General: She is not in acute distress.     Appearance: Normal appearance. She is well-developed. She is obese.   HENT:      Head: Normocephalic and atraumatic.      Right Ear: External ear normal.      Left Ear: External ear normal.   Eyes:      Conjunctiva/sclera: Conjunctivae normal.      Pupils: Pupils are equal, round, and reactive to light.   Neck:      Thyroid: No thyromegaly.   Cardiovascular:      Rate and Rhythm: Normal rate and regular rhythm.   Pulmonary:      Effort: Pulmonary effort is normal.      Breath sounds: Normal breath sounds.   Musculoskeletal:      Right lower leg: Edema present.      Left lower leg: Edema present.   Skin:     Findings: Erythema and lesion present.      Comments: Patient with chronic edema bilateral lower extremities extending up above her knees about 2+ today, there are some hard " knots and calcifications where she had blisters last week and multiple blisters are weepy with serous fluid today.  Skin is red but not hot and there is no streaking.   Neurological:      Mental Status: She is alert and oriented to person, place, and time.   Psychiatric:         Behavior: Behavior normal.      Comments: Patient tearful        Result Review :            C-reactive Protein (05/26/2021 11:09)  CBC & Differential (05/26/2021 11:09)  Basic Metabolic Panel (05/26/2021 11:09)       Assessment and Plan    Diagnoses and all orders for this visit:    1. Venous stasis dermatitis of both lower extremities (Primary)  -     Ambulatory Referral to Wound Clinic    2. Localized edema    3. Neuropathy  -     gabapentin (NEURONTIN) 100 MG capsule; Take 1 capsule by mouth 4 (Four) Times a Day As Needed (pain).  Dispense: 120 capsule; Refill: 2    4. Closed comminuted intertrochanteric fracture of left femur, sequela    Venous stasis dermatitis-because of the skin cracking and weeping, will try to get her in again to wound clinic since they have deferred her referral last week.  I am very concerned about her developing infection.  Hoping to get this under control so she can get her support hose back on  Pain and neuropathy-gabapentin refilled today  Left hip fracture-healing appropriately and ambulating well with walker    Follow Up   No follow-ups on file.  Patient was given instructions and counseling regarding her condition or for health maintenance advice. Please see specific information pulled into the AVS if appropriate.

## 2021-06-03 ENCOUNTER — TELEPHONE (OUTPATIENT)
Dept: FAMILY MEDICINE CLINIC | Facility: CLINIC | Age: 81
End: 2021-06-03

## 2021-06-03 DIAGNOSIS — I87.2 VENOUS STASIS DERMATITIS OF BOTH LOWER EXTREMITIES: Primary | ICD-10-CM

## 2021-06-03 NOTE — TELEPHONE ENCOUNTER
Valery from [facility] called and said patient wanted a referral to wound care. I told nurse that patient was here yesterday and a ref was already put in to [facility] douglas. She said patient prefers [facility], dr rodriguez because shes been there before.  She is aware dr kehrer is out of the office until next week

## 2021-06-04 ENCOUNTER — TELEPHONE (OUTPATIENT)
Dept: FAMILY MEDICINE CLINIC | Facility: CLINIC | Age: 81
End: 2021-06-04

## 2021-06-04 NOTE — TELEPHONE ENCOUNTER
RAYMOND FROM [place] CALLED SAYING THAT SHE IS PUTTING PT'S PHYSICAL THERAPY ON HOLD UNTIL PT SEES THE VASCULAR

## 2021-06-08 ENCOUNTER — TELEPHONE (OUTPATIENT)
Dept: FAMILY MEDICINE CLINIC | Facility: CLINIC | Age: 81
End: 2021-06-08

## 2021-06-08 ENCOUNTER — OFFICE VISIT (OUTPATIENT)
Dept: FAMILY MEDICINE CLINIC | Facility: CLINIC | Age: 81
End: 2021-06-08

## 2021-06-08 VITALS
WEIGHT: 208.2 LBS | TEMPERATURE: 98.2 F | BODY MASS INDEX: 36.89 KG/M2 | OXYGEN SATURATION: 96 % | SYSTOLIC BLOOD PRESSURE: 128 MMHG | HEART RATE: 84 BPM | DIASTOLIC BLOOD PRESSURE: 82 MMHG | HEIGHT: 63 IN

## 2021-06-08 DIAGNOSIS — J40 BRONCHITIS: Primary | ICD-10-CM

## 2021-06-08 PROCEDURE — 99214 OFFICE O/P EST MOD 30 MIN: CPT | Performed by: FAMILY MEDICINE

## 2021-06-08 RX ORDER — ALBUTEROL SULFATE 90 UG/1
2 AEROSOL, METERED RESPIRATORY (INHALATION) EVERY 4 HOURS PRN
Qty: 6.7 G | Refills: 0 | Status: SHIPPED | OUTPATIENT
Start: 2021-06-08 | End: 2021-06-10 | Stop reason: SDUPTHER

## 2021-06-08 RX ORDER — DEXTROMETHORPHAN HYDROBROMIDE AND PROMETHAZINE HYDROCHLORIDE 15; 6.25 MG/5ML; MG/5ML
5 SYRUP ORAL NIGHTLY PRN
Qty: 118 ML | Refills: 0 | Status: SHIPPED | OUTPATIENT
Start: 2021-06-08 | End: 2022-02-14

## 2021-06-08 RX ORDER — DOXYCYCLINE HYCLATE 100 MG/1
100 TABLET, DELAYED RELEASE ORAL 2 TIMES DAILY
Qty: 20 TABLET | Refills: 0 | Status: SHIPPED | OUTPATIENT
Start: 2021-06-08 | End: 2021-06-30

## 2021-06-08 NOTE — TELEPHONE ENCOUNTER
pts ins is denying the doxycycline hyc dr 100 mg tab, they will cover doxycycline monohydrate 50, 75 or 100 mg capsule

## 2021-06-08 NOTE — TELEPHONE ENCOUNTER
Pt just called through the answering service and said the pharmacy told us they faxed us the form at 11:15am saying her med wasn't covered and shes been all day without her new medicine. I told her I just got the fax like 20 min ago. She said I was lying and wanted this taken care of now

## 2021-06-08 NOTE — PROGRESS NOTES
"Chief Complaint  Cough (Productive)    Subjective          Annalee Melara presents to [facility]  Patient is here today for new problem.  She generally sees Dr. Kehrer.  She started noticing a cough that was productive about 24 hours ago.  She has not had fever or chills.  Her  also had a cough earlier this week that only lasted about a day he was seen at urgent care and given a steroid shot.  The patient denies any shortness of breath, but does have fatigue and malaise, bilateral earache, headache, sore throat, runny nose and congestion.  Has been fully vaccinated against COVID-19.      Objective   Vital Signs:   /82   Pulse 84   Temp 98.2 °F (36.8 °C)   Ht 160 cm (63\")   Wt 94.4 kg (208 lb 3.2 oz)   SpO2 96%   BMI 36.88 kg/m²     Physical Exam  Vitals and nursing note reviewed.   Constitutional:       Appearance: Normal appearance. She is well-developed.   HENT:      Head: Normocephalic and atraumatic.   Eyes:      General: No scleral icterus.     Conjunctiva/sclera: Conjunctivae normal.   Cardiovascular:      Rate and Rhythm: Normal rate and regular rhythm.      Heart sounds: Normal heart sounds.   Pulmonary:      Effort: Pulmonary effort is normal.      Breath sounds: Normal breath sounds.   Musculoskeletal:         General: Normal range of motion.      Cervical back: Normal range of motion and neck supple.   Skin:     General: Skin is warm and dry.      Capillary Refill: Capillary refill takes less than 2 seconds.      Findings: No rash.   Neurological:      Mental Status: She is alert and oriented to person, place, and time.   Psychiatric:         Mood and Affect: Mood normal.         Behavior: Behavior normal.         Thought Content: Thought content normal.         Judgment: Judgment normal.        Result Review :   The following data was reviewed by: Claudia Zamorano DO on 06/08/2021:  Common labs    Common Labsle 4/20/21 5/13/21 5/13/21 5/26/21 5/26/21 "     1129 1129 1109 1109   Glucose   107 (A) 114 (A)    BUN   13 13    Creatinine   0.63 0.65    eGFR Non  Am   85 88    eGFR African Am   98 106    Sodium   140 138    Potassium   4.6 5.1    Chloride   104 103    Calcium   9.3 9.6    Total Protein   5.7 (A)     Albumin   3.5 (A)     Total Bilirubin   0.3     Alkaline Phosphatase   252 (A)     AST (SGOT)   23     ALT (SGPT)   27     WBC 11.78 (A) 7.4   6.01   Hemoglobin 10.8 (A) 12.5   12.0   Hematocrit 33.9 (A) 38.3   36.1   Platelets 232 247   470 (A)   (A) Abnormal value       Comments are available for some flowsheets but are not being displayed.                     Assessment and Plan    Diagnoses and all orders for this visit:    1. Bronchitis (Primary)  -     promethazine-dextromethorphan (PROMETHAZINE-DM) 6.25-15 MG/5ML syrup; Take 5 mL by mouth At Night As Needed for Cough.  Dispense: 118 mL; Refill: 0  -     doxycycline (DORYX) 100 MG enteric coated tablet; Take 1 tablet by mouth 2 (Two) Times a Day.  Dispense: 20 tablet; Refill: 0  -     albuterol sulfate  (90 Base) MCG/ACT inhaler; Inhale 2 puffs Every 4 (Four) Hours As Needed for Shortness of Air (cough).  Dispense: 6.7 g; Refill: 0    Patient was seen today a new problem of bronchitis.  I have advised her to make sure to drink plenty of water, finish all of her antibiotics, and follow-up if her symptoms are not improving after the next week, or if they are worsening.  She was advised to follow the guidelines of social distancing, wearing a mask, and good hand hygiene.    Follow Up   Return if symptoms worsen or fail to improve.  Patient was given instructions and counseling regarding her condition or for health maintenance advice. Please see specific information pulled into the AVS if appropriate.

## 2021-06-09 ENCOUNTER — TELEPHONE (OUTPATIENT)
Dept: FAMILY MEDICINE CLINIC | Facility: CLINIC | Age: 81
End: 2021-06-09

## 2021-06-09 DIAGNOSIS — G62.9 NEUROPATHY: ICD-10-CM

## 2021-06-09 NOTE — TELEPHONE ENCOUNTER
Caller: Annalee Melara    Relationship: Self    Best call back number: [phone] (H)    What is the best time to reach you: AT DR. KEHRER CONVENIENCE     Who are you requesting to speak with (clinical staff, provider,  specific staff member): DR. KEHRER    Do you know the name of the person who called:     What was the call regarding: PATIENT CALLED AND WOULD LIKE TO HAVE A CALLBACK TO DISCUSS A ISSUE SHE IS HAVING. PATIENT WOULD NOT ADVISE ON WHAT THE ISSUE IS.     Do you require a callback: YES      THANKS

## 2021-06-09 NOTE — TELEPHONE ENCOUNTER
Caller: Annalee Melara    Relationship to patient: Self    Best call back number:[phone] (H)    Chief complaint:PATIENT CALLED IN STATING SHE WAS HAVING SHORTNESS OF BREATH, A COUGH AND COUGHING STUFF UP. WHEN ASKED IF IT WAS AT REST SHE SAID IT WAS ALL THE TIME. STTAED SHE THINKS SHE HAS BRONCHTIS. ADVISED PATIENT WITH THE SHORTNESS OF BREATH SHE SHOULD BE SEEN AT THE NEAREST ER. PATIENT STATED IT WAS NOT THAT BAD. ATTEMPTED TO TRANSER PATIENT TO THE OFFICE, BUT PATIENT DISCONNECTED WHILE I WAS GIVING THE OFFICE THE INFORMATION. ATTEMPTED TO CALL PATIENT BACK, NO ANSWER. UNSURE IF SHE WILL GO TO THE ER OR NOT.   Patient directed to call 911 or go to their nearest emergency room.

## 2021-06-09 NOTE — TELEPHONE ENCOUNTER
Tried to call, goes straight to voicemail. I asked her to call back if anyone else can help her and that it may be tomorrow before you can return her call.

## 2021-06-09 NOTE — TELEPHONE ENCOUNTER
I called pt back , she refused to tell me what the issue is. I asked her if it was in regards to her medication and she said no its about something that happened yesterday and would only tell you in a private conversation.

## 2021-06-10 ENCOUNTER — OFFICE VISIT (OUTPATIENT)
Dept: FAMILY MEDICINE CLINIC | Facility: CLINIC | Age: 81
End: 2021-06-10

## 2021-06-10 VITALS
OXYGEN SATURATION: 95 % | TEMPERATURE: 98.1 F | DIASTOLIC BLOOD PRESSURE: 68 MMHG | SYSTOLIC BLOOD PRESSURE: 122 MMHG | HEART RATE: 78 BPM | WEIGHT: 207.6 LBS | BODY MASS INDEX: 36.79 KG/M2 | HEIGHT: 63 IN

## 2021-06-10 DIAGNOSIS — H61.22 EXCESSIVE CERUMEN IN LEFT EAR CANAL: ICD-10-CM

## 2021-06-10 DIAGNOSIS — J40 BRONCHITIS: Primary | ICD-10-CM

## 2021-06-10 DIAGNOSIS — J44.1 COPD WITH EXACERBATION (HCC): ICD-10-CM

## 2021-06-10 PROCEDURE — 99214 OFFICE O/P EST MOD 30 MIN: CPT | Performed by: FAMILY MEDICINE

## 2021-06-10 RX ORDER — PREDNISONE 20 MG/1
20 TABLET ORAL 2 TIMES DAILY
Qty: 10 TABLET | Refills: 0 | Status: SHIPPED | OUTPATIENT
Start: 2021-06-10 | End: 2021-06-15

## 2021-06-10 RX ORDER — GABAPENTIN 100 MG/1
200 CAPSULE ORAL 2 TIMES DAILY
Qty: 120 CAPSULE | Refills: 2 | Status: SHIPPED | OUTPATIENT
Start: 2021-06-10 | End: 2021-06-30 | Stop reason: SDUPTHER

## 2021-06-10 RX ORDER — BENZONATATE 200 MG/1
200 CAPSULE ORAL 3 TIMES DAILY PRN
Qty: 30 CAPSULE | Refills: 0 | Status: SHIPPED | OUTPATIENT
Start: 2021-06-10 | End: 2022-05-31

## 2021-06-10 RX ORDER — ALBUTEROL SULFATE 90 UG/1
2 AEROSOL, METERED RESPIRATORY (INHALATION) EVERY 4 HOURS PRN
Qty: 6.7 G | Refills: 2 | Status: SHIPPED | OUTPATIENT
Start: 2021-06-10 | End: 2021-06-18 | Stop reason: SDUPTHER

## 2021-06-10 NOTE — PROGRESS NOTES
"Chief Complaint  Cough and Bronchitis    Subjective          Annalee Melara presents to [facility]  Patient reports she is not feeling better.  It is only been 2 days that she has been on the doxycycline.  She feels like she is wheezing more.  The inhaler does help and she has been using a couple times a day.  She denies any fever.  Her  is already feeling a whole lot better.  He got sick about 2 days for she did.  She has not had any chest pain or shortness of breath.  She is little winded with exertion.  She had Unna boots placed finally a couple days ago and is doing well with them.      Objective   Vital Signs:   /68   Pulse 78   Temp 98.1 °F (36.7 °C)   Ht 160 cm (63\")   Wt 94.2 kg (207 lb 9.6 oz)   SpO2 95%   BMI 36.77 kg/m²     Physical Exam  Constitutional:       General: She is not in acute distress.     Appearance: Normal appearance. She is well-developed.   HENT:      Head: Normocephalic and atraumatic.      Right Ear: External ear normal. There is impacted cerumen.      Left Ear: External ear normal. There is impacted cerumen.      Ears:      Comments: Can see some normal TM past each bolus of wax     Mouth/Throat:      Mouth: Mucous membranes are moist.      Pharynx: Oropharynx is clear.   Eyes:      Conjunctiva/sclera: Conjunctivae normal.      Pupils: Pupils are equal, round, and reactive to light.   Neck:      Thyroid: No thyromegaly.   Cardiovascular:      Rate and Rhythm: Normal rate and regular rhythm.      Heart sounds: No murmur heard.     Pulmonary:      Effort: Pulmonary effort is normal.      Breath sounds: Decreased breath sounds, wheezing and rhonchi present. No rales.      Comments: Few scattered end of the Tory wheezes and rhonchi in the upper lobes bilaterally  Abdominal:      General: Bowel sounds are normal.      Palpations: Abdomen is soft.      Tenderness: There is no abdominal tenderness.   Musculoskeletal:         General: " Normal range of motion.      Cervical back: Neck supple.      Right lower leg: Edema present.      Left lower leg: Edema present.      Comments: Unna Boots in place   Lymphadenopathy:      Cervical: No cervical adenopathy.   Skin:     General: Skin is warm and dry.   Neurological:      Mental Status: She is alert and oriented to person, place, and time.   Psychiatric:         Mood and Affect: Mood normal.         Behavior: Behavior normal.        Result Review :                Office Visit with Claudia Zamorano DO (06/08/2021)    Assessment and Plan    Diagnoses and all orders for this visit:    1. Bronchitis (Primary)  -     predniSONE (DELTASONE) 20 MG tablet; Take 1 tablet by mouth 2 (Two) Times a Day for 5 days.  Dispense: 10 tablet; Refill: 0  -     benzonatate (TESSALON) 200 MG capsule; Take 1 capsule by mouth 3 (Three) Times a Day As Needed for Cough.  Dispense: 30 capsule; Refill: 0  -     albuterol sulfate  (90 Base) MCG/ACT inhaler; Inhale 2 puffs Every 4 (Four) Hours As Needed for Shortness of Air (cough).  Dispense: 6.7 g; Refill: 2    2. COPD with exacerbation (CMS/HCC)  -     predniSONE (DELTASONE) 20 MG tablet; Take 1 tablet by mouth 2 (Two) Times a Day for 5 days.  Dispense: 10 tablet; Refill: 0  -     benzonatate (TESSALON) 200 MG capsule; Take 1 capsule by mouth 3 (Three) Times a Day As Needed for Cough.  Dispense: 30 capsule; Refill: 0    3. Excessive cerumen in left ear canal    Bronchitis-we will add in prednisone and some benzonatate for during the day, use cough syrup at night, finish up the doxycycline and let me know if you fail to improve over the next couple weeks  Excessive cerumen in the ear-get some Debrox over-the-counter    Follow Up   No follow-ups on file.  Patient was given instructions and counseling regarding her condition or for health maintenance advice. Please see specific information pulled into the AVS if appropriate.

## 2021-06-10 NOTE — PATIENT INSTRUCTIONS
Use debrox per package directions.   Use the inhaler every 4 hours while awake for the next 2 days.

## 2021-06-14 DIAGNOSIS — F51.04 CHRONIC INSOMNIA: ICD-10-CM

## 2021-06-14 RX ORDER — ZOLPIDEM TARTRATE 5 MG/1
5 TABLET ORAL NIGHTLY PRN
Qty: 30 TABLET | Refills: 1 | Status: SHIPPED | OUTPATIENT
Start: 2021-06-14 | End: 2021-07-26

## 2021-06-14 NOTE — TELEPHONE ENCOUNTER
Caller: Annalee Melara    Relationship: Self    Best call back number:     Medication needed:   Requested Prescriptions     Pending Prescriptions Disp Refills   • zolpidem (AMBIEN) 5 MG tablet 30 tablet 1     Sig: Take 1 tablet by mouth At Night As Needed for Sleep.           Does the patient have less than a 3 day supply:  [x] Yes  [] No    What is the patient's preferred pharmacy: [facility] [address] [place] [address] - [phone] [facility] [phone] FX

## 2021-06-15 ENCOUNTER — TELEPHONE (OUTPATIENT)
Dept: FAMILY MEDICINE CLINIC | Facility: CLINIC | Age: 81
End: 2021-06-15

## 2021-06-15 NOTE — TELEPHONE ENCOUNTER
Called peace with [facility], she said the order would just be for a dc assesment for PT. Nursing is doing her unna boots and they are still in. Peace said from a PT stand point there is no reason for her to be there, she said patient was put on hold a few weeks ago bc she was crying at her visits saying she was in too much pain to do any PT. When peace tried to resume her PT she discovered (from pt) that she is driving within town, ambulating well with her cane and going to her grandsons college baseball games.

## 2021-06-15 NOTE — TELEPHONE ENCOUNTER
Caller: RAYMOND SUBRAMANIAN/[facility]    Best call back number: [phone]    What was the call regarding: VERBAL ORDER FOR DISCHARGE    Do you require a callback: YES    RAYMOND WITH [facility] CALLED REQUESTING A VERBAL ORDER TO DISCHARGE THE PATIENT FROM PHYSICAL THERAPY.

## 2021-06-18 ENCOUNTER — TELEPHONE (OUTPATIENT)
Dept: FAMILY MEDICINE CLINIC | Facility: CLINIC | Age: 81
End: 2021-06-18

## 2021-06-18 DIAGNOSIS — J40 BRONCHITIS: ICD-10-CM

## 2021-06-18 RX ORDER — ALBUTEROL SULFATE 90 UG/1
2 AEROSOL, METERED RESPIRATORY (INHALATION) EVERY 4 HOURS PRN
Qty: 6.7 G | Refills: 2 | Status: SHIPPED | OUTPATIENT
Start: 2021-06-18

## 2021-06-18 NOTE — TELEPHONE ENCOUNTER
Caller: Annalee Melara    Relationship: Self    Best call back number: [phone]    What is the medical concern/diagnosis:     What specialty or service is being requested: PULMONOLOGIST    What is the provider, practice or medical service name:     What is the office location: [facility]    What is the office phone number:     Any additional details: PATIENT CALLED IN AND WANTED TO RESTART HER REFERRAL TO A PULMONOLOGIST. PLEASE CALL PATIENT AND ADVISE.

## 2021-06-18 NOTE — TELEPHONE ENCOUNTER
Caller: Annalee Melara    Relationship: Self    Best call back number: [phone]    Medication needed:   Requested Prescriptions     Pending Prescriptions Disp Refills   • albuterol sulfate  (90 Base) MCG/ACT inhaler 6.7 g 2     Sig: Inhale 2 puffs Every 4 (Four) Hours As Needed for Shortness of Air (cough).       When do you need the refill by: ASAP    What additional details did the patient provide when requesting the medication: PATIENT HAS A LITTLE BIT LEFT  Does the patient have less than a 3 day supply:  [x] Yes  [] No    What is the patient's preferred pharmacy: [facility] [address] [place] [address] - [phone]  - [phone] FX

## 2021-06-18 NOTE — TELEPHONE ENCOUNTER
Pt called back , she said she thought she was ref to a pulm and she canceled it. I told her she canceled her sleep study and that's all showed. She wants to know if you can refer her to a pulmonologist for COPD and bronchitis

## 2021-06-21 NOTE — TELEPHONE ENCOUNTER
It will take a long time to get to see pulmonary and I do not understand why. She saw Dr. Zamorano for one acute bronchitis and then followed up with me. She should follow up if she is not better. There are things that I can do for her COPD. If she insists on seeing pulmonary, she does not need a referral with Medicare.

## 2021-06-25 RX ORDER — OMEPRAZOLE 40 MG/1
40 CAPSULE, DELAYED RELEASE ORAL EVERY MORNING
Qty: 30 CAPSULE | Refills: 2 | Status: SHIPPED | OUTPATIENT
Start: 2021-06-25 | End: 2022-09-15 | Stop reason: SDUPTHER

## 2021-06-25 RX ORDER — EZETIMIBE 10 MG/1
10 TABLET ORAL DAILY
Qty: 30 TABLET | Refills: 2 | Status: SHIPPED | OUTPATIENT
Start: 2021-06-25 | End: 2021-11-10 | Stop reason: SDUPTHER

## 2021-06-25 NOTE — TELEPHONE ENCOUNTER
Caller: [facility] [address] [place] [address] - [phone] [facility] [phone] FX    Relationship: Pharmacy    Best call back number: [phone]  Medication needed:   Requested Prescriptions     Pending Prescriptions Disp Refills   • ezetimibe (ZETIA) 10 MG tablet       Sig: Take 1 tablet by mouth Daily.   • omeprazole (priLOSEC) 40 MG capsule       Sig: Take 1 capsule by mouth Every Morning.       When do you need the refill by: ASA[    What additional details did the patient provide when requesting the medication:     Does the patient have less than a 3 day supply:  [x] Yes  [] No    What is the patient's preferred pharmacy: [address] - [phone] [facility] [phone] FX

## 2021-06-29 DIAGNOSIS — N39.0 CHRONIC UTI: ICD-10-CM

## 2021-06-29 RX ORDER — NITROFURANTOIN 25; 75 MG/1; MG/1
100 CAPSULE ORAL DAILY
Qty: 90 CAPSULE | Refills: 1 | Status: SHIPPED | OUTPATIENT
Start: 2021-06-29 | End: 2021-07-02 | Stop reason: SDUPTHER

## 2021-06-29 NOTE — TELEPHONE ENCOUNTER
Caller: Annalee Melara    Relationship: Self    Best call back number: [phone]    Medication needed:   Requested Prescriptions     Pending Prescriptions Disp Refills   • nitrofurantoin, macrocrystal-monohydrate, (MACROBID) 100 MG capsule 90 capsule 1     Sig: Take 1 capsule by mouth Daily.       When do you need the refill by: 06/29/2021    What additional details did the patient provide when requesting the medication: PATIENT STATED THAT IT WOULD NEED TO BE A NEW PRESCRIPTION DUE TO THE DOSE NEEDING TO BE TWICE A DAY AND NOT ONCE A DAY. SHE STATED THAT ITS ALWAYS BEEN TWICE A DAY AND FOR SOME REASON THE LAST REFILL DIRECTIONS WERE ONCE A DAY. SHE HAS ENOUGH MEDICATION FOR TODAY ONLY      Does the patient have less than a 3 day supply:  [x] Yes  [] No    What is the patient's preferred pharmacy: [facility] [address] [place] [address] - [phone]  - [phone] FX

## 2021-06-30 ENCOUNTER — OFFICE VISIT (OUTPATIENT)
Dept: FAMILY MEDICINE CLINIC | Facility: CLINIC | Age: 81
End: 2021-06-30

## 2021-06-30 VITALS
HEART RATE: 84 BPM | SYSTOLIC BLOOD PRESSURE: 140 MMHG | HEIGHT: 63 IN | BODY MASS INDEX: 35.51 KG/M2 | WEIGHT: 200.4 LBS | TEMPERATURE: 98.7 F | OXYGEN SATURATION: 100 % | DIASTOLIC BLOOD PRESSURE: 84 MMHG

## 2021-06-30 DIAGNOSIS — R60.0 LOCALIZED EDEMA: ICD-10-CM

## 2021-06-30 DIAGNOSIS — G62.9 NEUROPATHY: ICD-10-CM

## 2021-06-30 DIAGNOSIS — S32.000S COMPRESSION FRACTURE OF LUMBAR VERTEBRA, UNSPECIFIED LUMBAR VERTEBRAL LEVEL, SEQUELA: ICD-10-CM

## 2021-06-30 DIAGNOSIS — F32.A DEPRESSION, UNSPECIFIED DEPRESSION TYPE: ICD-10-CM

## 2021-06-30 DIAGNOSIS — R68.2 DRY MOUTH: ICD-10-CM

## 2021-06-30 DIAGNOSIS — G89.29 CHRONIC MIDLINE LOW BACK PAIN WITHOUT SCIATICA: ICD-10-CM

## 2021-06-30 DIAGNOSIS — J31.0 CHRONIC RHINITIS: ICD-10-CM

## 2021-06-30 DIAGNOSIS — S72.142S CLOSED COMMINUTED INTERTROCHANTERIC FRACTURE OF LEFT FEMUR, SEQUELA: Primary | ICD-10-CM

## 2021-06-30 DIAGNOSIS — M54.50 CHRONIC MIDLINE LOW BACK PAIN WITHOUT SCIATICA: ICD-10-CM

## 2021-06-30 PROCEDURE — 99214 OFFICE O/P EST MOD 30 MIN: CPT | Performed by: FAMILY MEDICINE

## 2021-06-30 RX ORDER — MONTELUKAST SODIUM 10 MG/1
10 TABLET ORAL NIGHTLY
Start: 2021-06-30

## 2021-06-30 RX ORDER — GABAPENTIN 100 MG/1
200 CAPSULE ORAL 2 TIMES DAILY
Qty: 120 CAPSULE | Refills: 2 | Status: SHIPPED | OUTPATIENT
Start: 2021-06-30 | End: 2022-11-10

## 2021-06-30 RX ORDER — ALENDRONATE SODIUM 70 MG/1
70 TABLET ORAL
Qty: 13 TABLET | Refills: 3 | Status: SHIPPED | OUTPATIENT
Start: 2021-06-30 | End: 2022-02-14 | Stop reason: SDDI

## 2021-06-30 RX ORDER — DULOXETIN HYDROCHLORIDE 30 MG/1
30 CAPSULE, DELAYED RELEASE ORAL DAILY
Qty: 30 CAPSULE | Refills: 2 | Status: SHIPPED | OUTPATIENT
Start: 2021-06-30 | End: 2021-09-16

## 2021-06-30 NOTE — PROGRESS NOTES
"Chief Complaint  Hip Injury (f/u )    Subjective          Annalee Melara presents to [facility]  Here to follow up on her hip pain.  Spilled her gabapentin but now having a lot more pain.  Has not been progressing the way she wants.   Leg swelling has been better.  Is not getting wraps anymore.  No she needs to put her support hose on.  She has had a lot of dry mouth and is wondering which of her medicines is making it worse.  Her mood is doing okay so she is willing to try a lower dose of Cymbalta.  She continues to have low back pain and is wondering about her bone density scan results.  She does have a history of compression fractures.        Objective   Vital Signs:   /84   Pulse 84   Temp 98.7 °F (37.1 °C)   Ht 160 cm (63\")   Wt 90.9 kg (200 lb 6.4 oz)   SpO2 100%   BMI 35.50 kg/m²     Physical Exam  Constitutional:       General: She is not in acute distress.     Appearance: Normal appearance. She is well-developed. She is obese.   HENT:      Head: Normocephalic and atraumatic.      Right Ear: External ear normal.      Left Ear: External ear normal.      Mouth/Throat:      Mouth: Mucous membranes are moist.      Pharynx: Oropharynx is clear.   Eyes:      Conjunctiva/sclera: Conjunctivae normal.      Pupils: Pupils are equal, round, and reactive to light.   Neck:      Thyroid: No thyromegaly.   Cardiovascular:      Rate and Rhythm: Normal rate and regular rhythm.      Heart sounds: No murmur heard.     Pulmonary:      Effort: Pulmonary effort is normal.      Breath sounds: Normal breath sounds. No wheezing.   Abdominal:      Palpations: Abdomen is soft.   Musculoskeletal:         General: Tenderness present. Normal range of motion.      Cervical back: Neck supple.      Right lower leg: No edema.      Left lower leg: No edema.      Comments: Tender over left trochanter    No edema today and hose are not present   Lymphadenopathy:      Cervical: No cervical " adenopathy.   Skin:     General: Skin is warm and dry.   Neurological:      Mental Status: She is alert and oriented to person, place, and time.      Motor: Weakness present.      Gait: Gait abnormal.      Comments: Using walker to walk.  Hip flexor strength 4 out of 5 on the left and 5 out of 5 on the right   Psychiatric:         Mood and Affect: Mood normal.         Behavior: Behavior normal.        Result Review :                 Assessment and Plan    Diagnoses and all orders for this visit:    1. Closed comminuted intertrochanteric fracture of left femur, sequela (Primary)  -     Ambulatory Referral to Physical Therapy Evaluate and treat    2. Localized edema    3. Neuropathy  -     gabapentin (NEURONTIN) 100 MG capsule; Take 2 capsules by mouth 2 (two) times a day.  Dispense: 120 capsule; Refill: 2    4. Dry mouth    5. Depression, unspecified depression type  -     DULoxetine (Cymbalta) 30 MG capsule; Take 1 capsule by mouth Daily.  Dispense: 30 capsule; Refill: 2    6. Chronic midline low back pain without sciatica  -     Ambulatory Referral to Physical Therapy Evaluate and treat    7. Compression fracture of lumbar vertebra, unspecified lumbar vertebral level, sequela  -     Ambulatory Referral to Physical Therapy Evaluate and treat  -     alendronate (Fosamax) 70 MG tablet; Take 1 tablet by mouth Every 7 (Seven) Days.  Dispense: 13 tablet; Refill: 3    8. Chronic rhinitis  -     montelukast (Singulair) 10 MG tablet; Take 1 tablet by mouth Every Night.    Hip fracture-slowly improving, will get her set up with physical therapy  Edema-get support hose on now  Neuropathy-refill gabapentin  Dry mouth-do trial of lower dose duloxetine to see if it helps  Depression-continue Cymbalta but at a lower dose  Chronic back pain-we will see if physical therapy can help  Pression fractures-start Fosamax  Chronic rhinitis-refill Singulair    Follow Up   Return in about 3 months (around 9/30/2021) for Recheck.  Patient  was given instructions and counseling regarding her condition or for health maintenance advice. Please see specific information pulled into the AVS if appropriate.

## 2021-07-02 ENCOUNTER — TELEPHONE (OUTPATIENT)
Dept: FAMILY MEDICINE CLINIC | Facility: CLINIC | Age: 81
End: 2021-07-02

## 2021-07-02 DIAGNOSIS — N39.0 CHRONIC UTI: ICD-10-CM

## 2021-07-02 RX ORDER — NITROFURANTOIN 25; 75 MG/1; MG/1
100 CAPSULE ORAL DAILY
Qty: 90 CAPSULE | Refills: 0 | Status: SHIPPED | OUTPATIENT
Start: 2021-07-02 | End: 2021-09-30

## 2021-07-02 NOTE — TELEPHONE ENCOUNTER
PT CALLED C/O L HIP PAIN I OFFERED HER OPENINGS TODAY WITH OUR OTHER PROVIDERS BUT SHE WANTED TO WAIT UNTIL NEXT WEEK TO SEE .

## 2021-07-06 NOTE — TELEPHONE ENCOUNTER
Patient had a left hip fracture couple months ago.  She should follow-up with orthopedist if she continues to have pain.  She has gabapentin for pain and I would not give her anything stronger.  She might just need to continue some physical therapy but should follow-up with her orthopedist, not me.

## 2021-07-25 DIAGNOSIS — F51.04 CHRONIC INSOMNIA: ICD-10-CM

## 2021-07-26 ENCOUNTER — TELEPHONE (OUTPATIENT)
Dept: FAMILY MEDICINE CLINIC | Facility: CLINIC | Age: 81
End: 2021-07-26

## 2021-07-26 RX ORDER — ZOLPIDEM TARTRATE 5 MG/1
5 TABLET ORAL NIGHTLY PRN
Qty: 30 TABLET | Refills: 0 | Status: SHIPPED | OUTPATIENT
Start: 2021-07-26 | End: 2021-10-06

## 2021-07-26 NOTE — TELEPHONE ENCOUNTER
Called pt back, she said back pain has been going on for a month and you were aware of this and told her to see her surgeon. She did and they are going to order her an MRI. She said shes been using a wheelchair for a month and she wanted a visit to see you asap. I told her you didn't have anything this week and suggested she wait until she has an mri to her the surgeons/ortho recommendation. She said she wants to hear from you about this and to tell you that a phone call to her from you would take care of this, she asked me to p ut you on the phone and I told her that's not how our system worked, patient seemed angry.

## 2021-07-26 NOTE — TELEPHONE ENCOUNTER
Caller: Annalee Melara    Relationship: Self    Best call back number: [phone]*    What is the best time to reach you: ANYTIME    Who are you requesting to speak with (clinical staff, provider,  specific staff member): DR. MEREDITH KEHRER    What was the call regarding: LOW BACK PAIN    Do you require a callback: YES. THE PATIENT IS REQUESTING A CALLBACK FROM DR. KEHRER, THIS AFTERNOON.

## 2021-07-27 NOTE — TELEPHONE ENCOUNTER
We imaged her back over 3 months ago.  At the time she had her CT that showed old compression fractures and spinal stenosis, she fell and broke her hip.  Then we were dealing with that issue.  She is also been more concerned at following up with vascular.  I recommended she see spine surgery to consider she is a candidate for kyphoplasty or see pain management for injections for her back.  I do not need to talk to her about this or to see her for it.  We simply need to set of these referrals if they have not been taken care of yet.  Please let me know in which direction she would like to go.  Does she want to see a spine specialist or does she want to see a pain specialist for possible injections?

## 2021-07-27 NOTE — TELEPHONE ENCOUNTER
Patient said she wants to see what the results are of her mri that her ortho is sending her for, before she makes a decision

## 2021-08-30 ENCOUNTER — OFFICE VISIT (OUTPATIENT)
Dept: FAMILY MEDICINE CLINIC | Facility: CLINIC | Age: 81
End: 2021-08-30

## 2021-08-30 VITALS
HEIGHT: 63 IN | WEIGHT: 192.4 LBS | OXYGEN SATURATION: 97 % | TEMPERATURE: 98.5 F | SYSTOLIC BLOOD PRESSURE: 142 MMHG | HEART RATE: 72 BPM | DIASTOLIC BLOOD PRESSURE: 80 MMHG | BODY MASS INDEX: 34.09 KG/M2

## 2021-08-30 DIAGNOSIS — Z20.822 CLOSE EXPOSURE TO COVID-19 VIRUS: Primary | ICD-10-CM

## 2021-08-30 PROBLEM — H02.055 TRICHIASIS OF LEFT LOWER EYELID: Status: ACTIVE | Noted: 2021-07-22

## 2021-08-30 PROBLEM — H52.31 ANISOMETROPIA: Status: ACTIVE | Noted: 2021-07-22

## 2021-08-30 PROBLEM — H52.203 ASTIGMATISM OF BOTH EYES: Status: ACTIVE | Noted: 2021-07-22

## 2021-08-30 PROBLEM — H40.1131 CHRONIC OPEN ANGLE GLAUCOMA OF BOTH EYES, MILD STAGE: Status: ACTIVE | Noted: 2021-07-22

## 2021-08-30 PROBLEM — Z94.7 PENETRATING KERATOPLASTY GRAFT IN PLACE: Status: ACTIVE | Noted: 2021-07-22

## 2021-08-30 PROBLEM — Z96.1 PSEUDOPHAKIA: Status: ACTIVE | Noted: 2021-07-22

## 2021-08-30 PROCEDURE — 99213 OFFICE O/P EST LOW 20 MIN: CPT | Performed by: NURSE PRACTITIONER

## 2021-08-30 NOTE — PROGRESS NOTES
"Chief Complaint  Exposure To Known Illness (covid )    Subjective          Annalee Melara presents to [facility]  History of Present Illness     Patient is a patient of Dr. Meredith Kehrer.  This is my first time seeing this patient.  She presents today with exposure to known illness of her caregiver 6 days ago.  She denies any symptoms of Covid.  She states she is feeling just fine.    Objective   Vital Signs:   /80   Pulse 72   Temp 98.5 °F (36.9 °C)   Ht 160 cm (63\")   Wt 87.3 kg (192 lb 6.4 oz)   SpO2 97%   BMI 34.08 kg/m²     Physical Exam  Constitutional:       Appearance: Normal appearance.   Neurological:      Mental Status: She is alert and oriented to person, place, and time.   Psychiatric:         Mood and Affect: Mood normal.         Behavior: Behavior normal.         Thought Content: Thought content normal.         Judgment: Judgment normal.        Result Review :                 Assessment and Plan    Diagnoses and all orders for this visit:    1. Close exposure to COVID-19 virus (Primary)  -     COVID-19,[facility] ROUTINE, NP/OP SWAB IN TRANSPORT MEDIA OR ESWAB 72 HR TAT - Swab, Nasopharynx; Future  -     COVID-19,[facility] ROUTINE, NP/OP SWAB IN TRANSPORT MEDIA OR ESWAB 72 HR TAT - Swab, Nasopharynx      Discussed with patient if any symptoms arise to notify me.  Continue self quarantine until results.  May take Tylenol or ibuprofen if starting to have symptoms.  May also add on allergy medication as needed.  Call me with any symptoms.  She verbalizes understanding.      Follow Up   No follow-ups on file.  Patient was given instructions and counseling regarding her condition or for health maintenance advice. Please see specific information pulled into the AVS if appropriate.       "

## 2021-08-31 LAB — SARS-COV-2 RNA RESP QL NAA+PROBE: NOT DETECTED

## 2021-09-16 ENCOUNTER — OFFICE VISIT (OUTPATIENT)
Dept: FAMILY MEDICINE CLINIC | Facility: CLINIC | Age: 81
End: 2021-09-16

## 2021-09-16 VITALS
WEIGHT: 184.6 LBS | SYSTOLIC BLOOD PRESSURE: 128 MMHG | DIASTOLIC BLOOD PRESSURE: 82 MMHG | OXYGEN SATURATION: 96 % | TEMPERATURE: 99.6 F | HEIGHT: 63 IN | HEART RATE: 85 BPM | BODY MASS INDEX: 32.71 KG/M2

## 2021-09-16 DIAGNOSIS — F32.A DEPRESSION, UNSPECIFIED DEPRESSION TYPE: ICD-10-CM

## 2021-09-16 DIAGNOSIS — R19.7 DIARRHEA, UNSPECIFIED TYPE: Primary | ICD-10-CM

## 2021-09-16 PROCEDURE — 99214 OFFICE O/P EST MOD 30 MIN: CPT | Performed by: NURSE PRACTITIONER

## 2021-09-16 RX ORDER — DULOXETINE 40 MG/1
40 CAPSULE, DELAYED RELEASE ORAL DAILY
Qty: 30 CAPSULE | Refills: 2 | Status: SHIPPED | OUTPATIENT
Start: 2021-09-16 | End: 2021-09-30 | Stop reason: DRUGHIGH

## 2021-09-16 NOTE — PROGRESS NOTES
"Chief Complaint  Diarrhea (for 2 weeks, believes it may be diverticulitis)    Subjective          Annalee Melara presents to [facility]  History of Present Illness     Barber is a patient of Dr. Meredith Kehrer. She presents today with complaint of diarrhea for off and on for last 2 weeks. She is concerned about diverticulitis.  She reports abdominal pain for first week, but has gotten better.    She hasn't ever been diagnosed with diverticulosis before.   She reports no appetite.  Gets worse with certain things she eats.   She states diarrhea or loose stool has remained about the same. Denies any blood in stool.    Denies fever  States she has been able to eat and drink ok, but watches for certain things she eats.     Doesn't remember eating anything out to eat or different before it started.     Reports mild nausea at times, but no vomiting.      Having some times of not having diarrhea some days    Reports Monday night had cream of chicken, asparagus and toast and went  Last night had yogurt and fruit and granola and went.          She is also asking to increase her cymbalta to higher dose        Objective   Vital Signs:   /82   Pulse 85   Temp 99.6 °F (37.6 °C)   Ht 160 cm (63\")   Wt 83.7 kg (184 lb 9.6 oz)   SpO2 96%   BMI 32.70 kg/m²     Physical Exam  Constitutional:       Appearance: Normal appearance.   Cardiovascular:      Rate and Rhythm: Normal rate and regular rhythm.   Pulmonary:      Effort: Pulmonary effort is normal.   Abdominal:      General: Bowel sounds are normal.      Palpations: Abdomen is soft.      Tenderness: There is no abdominal tenderness.   Skin:     General: Skin is warm and dry.   Neurological:      Mental Status: She is alert and oriented to person, place, and time.   Psychiatric:         Mood and Affect: Mood normal.         Behavior: Behavior normal.         Thought Content: Thought content normal.         Judgment: Judgment normal.      "   Result Review :                 Assessment and Plan    Diagnoses and all orders for this visit:    1. Diarrhea, unspecified type (Primary)    2. Depression, unspecified depression type  -     DULoxetine 40 MG capsule delayed-release particles; Take 1 capsule by mouth Daily.  Dispense: 30 capsule; Refill: 2      Discussed with patient that based on symptoms I believe this is related to viral episode rather than anything else.  I would like her to take probiotic daily.  She states she has some at home and does not need prescription.  I offered since longevity of symptoms for her to have labs and CT scan.  However, she states she does not want to proceed with this for now.  If symptoms are worsening, or no more improvement notify me.  However, I feel okay with her decision based on not having diarrhea daily at this point and is being more formed.    Depression-I will increase duloxetine to 40 mg.  She states is working well but does not well enough.  Denies any suicidal thoughts.  Any worsening mood notify provider.  She verbalizes understanding      Follow Up   No follow-ups on file.  Patient was given instructions and counseling regarding her condition or for health maintenance advice. Please see specific information pulled into the AVS if appropriate.

## 2021-09-22 ENCOUNTER — OFFICE VISIT (OUTPATIENT)
Dept: FAMILY MEDICINE CLINIC | Facility: CLINIC | Age: 81
End: 2021-09-22

## 2021-09-22 VITALS
OXYGEN SATURATION: 94 % | BODY MASS INDEX: 32.46 KG/M2 | HEIGHT: 63 IN | SYSTOLIC BLOOD PRESSURE: 142 MMHG | WEIGHT: 183.2 LBS | HEART RATE: 86 BPM | TEMPERATURE: 98.6 F | DIASTOLIC BLOOD PRESSURE: 84 MMHG

## 2021-09-22 DIAGNOSIS — R63.4 LOSS OF WEIGHT: ICD-10-CM

## 2021-09-22 DIAGNOSIS — R19.7 DIARRHEA, UNSPECIFIED TYPE: Primary | ICD-10-CM

## 2021-09-22 DIAGNOSIS — R10.84 GENERALIZED ABDOMINAL PAIN: ICD-10-CM

## 2021-09-22 LAB
BILIRUB BLD-MCNC: NEGATIVE MG/DL
GLUCOSE UR STRIP-MCNC: NEGATIVE MG/DL
KETONES UR QL: ABNORMAL
LEUKOCYTE EST, POC: ABNORMAL
NITRITE UR-MCNC: NEGATIVE MG/ML
PH UR: 5.5 [PH] (ref 5–8)
PROT UR STRIP-MCNC: ABNORMAL MG/DL
RBC # UR STRIP: ABNORMAL /UL
SP GR UR: 1.02 (ref 1–1.03)
UROBILINOGEN UR QL: NORMAL

## 2021-09-22 PROCEDURE — 99214 OFFICE O/P EST MOD 30 MIN: CPT | Performed by: FAMILY MEDICINE

## 2021-09-22 PROCEDURE — 81003 URINALYSIS AUTO W/O SCOPE: CPT | Performed by: FAMILY MEDICINE

## 2021-09-22 PROCEDURE — 87324 CLOSTRIDIUM AG IA: CPT | Performed by: FAMILY MEDICINE

## 2021-09-22 NOTE — PROGRESS NOTES
"Chief Complaint  GI Problem (digestion problems) and Diarrhea    Subjective          Annalee Melara presents to [facility]  Has had diarrhea for 3 weeks now.  Saw Pj last week who thought it was viral.  Getting weak.  Nothing tastes good.   No nausea or vomiting.  Has had some lower abdominal pain.  No known exposures with diarrhea.   No fever.   Only had one watery stool yesterday.  Yesterday ate a milkshake that was ok.  And then cottage cheese and yogurt went through her.   Has not noticed any black or bloody stools.  Last Cscope 4-5 years ago in [place].   Has never had diverticulitis.  Has had some appetite.  Has not had a fromed stool for over 3 weeks.   No real bad odors.        Objective   Vital Signs:   /84   Pulse 86   Temp 98.6 °F (37 °C)   Ht 160 cm (63\")   Wt 83.1 kg (183 lb 3.2 oz)   SpO2 94%   BMI 32.45 kg/m²     Physical Exam  Constitutional:       General: She is not in acute distress.     Appearance: Normal appearance. She is well-developed. She is obese.   HENT:      Head: Normocephalic and atraumatic.      Right Ear: Tympanic membrane, ear canal and external ear normal.      Left Ear: Tympanic membrane, ear canal and external ear normal.      Mouth/Throat:      Mouth: Mucous membranes are moist.      Pharynx: Oropharynx is clear.   Eyes:      Conjunctiva/sclera: Conjunctivae normal.      Pupils: Pupils are equal, round, and reactive to light.   Neck:      Thyroid: No thyromegaly.   Cardiovascular:      Rate and Rhythm: Normal rate and regular rhythm.      Heart sounds: No murmur heard.     Pulmonary:      Effort: Pulmonary effort is normal.      Breath sounds: Normal breath sounds. No wheezing.   Abdominal:      General: Bowel sounds are normal.      Palpations: Abdomen is soft.      Tenderness: There is abdominal tenderness. There is no guarding or rebound.      Comments: Mild to moderate right lower quadrant tenderness without any guarding or " rebound   Musculoskeletal:         General: Normal range of motion.      Cervical back: Neck supple.   Lymphadenopathy:      Cervical: No cervical adenopathy.   Skin:     General: Skin is warm and dry.   Neurological:      Mental Status: She is alert and oriented to person, place, and time.   Psychiatric:         Mood and Affect: Mood normal.         Behavior: Behavior normal.        Result Review :                 Assessment and Plan    Diagnoses and all orders for this visit:    1. Diarrhea, unspecified type (Primary)  -     POC Urinalysis Dipstick, Automated  -     Urine Culture - Urine, Urine, Clean Catch  -     TSH  -     CBC & Differential  -     Comprehensive Metabolic Panel  -     Amylase  -     Lipase  -     CT Abdomen Pelvis With Contrast; Future  -     Stool Culture (Reference Lab) - Stool, Per Rectum; Future  -     Clostridium Difficile Toxin, PCR - Stool, Per Rectum; Future  -     Ova & Parasite Examination - Stool, Per Rectum; Future  -     Ova & Parasite Examination - Stool, Per Rectum  -     Clostridium Difficile Toxin, PCR - Stool, Per Rectum  -     Stool Culture (Reference Lab) - Stool, Per Rectum    2. Loss of weight  -     POC Urinalysis Dipstick, Automated  -     Urine Culture - Urine, Urine, Clean Catch  -     TSH  -     CBC & Differential  -     Comprehensive Metabolic Panel  -     Amylase  -     Lipase  -     CT Abdomen Pelvis With Contrast; Future    3. Generalized abdominal pain  -     POC Urinalysis Dipstick, Automated  -     Urine Culture - Urine, Urine, Clean Catch  -     TSH  -     CBC & Differential  -     Comprehensive Metabolic Panel  -     Amylase  -     Lipase  -     CT Abdomen Pelvis With Contrast; Future    Diarrhea with loss of weight and abdominal pain-since this has been going on for 3 weeks and has associated weight loss, will go ahead and get some work-up done including a CT scan.  Further work-up pending results    Follow Up   No follow-ups on file.  Patient was given  instructions and counseling regarding her condition or for health maintenance advice. Please see specific information pulled into the AVS if appropriate.

## 2021-09-22 NOTE — PATIENT INSTRUCTIONS
Follow up pending results.     Diarrhea, Adult  Diarrhea is frequent loose and watery bowel movements. Diarrhea can make you feel weak and cause you to become dehydrated. Dehydration can make you tired and thirsty, cause you to have a dry mouth, and decrease how often you urinate.  Diarrhea typically lasts 2-3 days. However, it can last longer if it is a sign of something more serious. It is important to treat your diarrhea as told by your health care provider.  Follow these instructions at home:  Eating and drinking         Follow these recommendations as told by your health care provider:  · Take an oral rehydration solution (ORS). This is an over-the-counter medicine that helps return your body to its normal balance of nutrients and water. It is found at pharmacies and retail stores.  · Drink plenty of fluids, such as water, ice chips, diluted fruit juice, and low-calorie sports drinks. You can drink milk also, if desired.  · Avoid drinking fluids that contain a lot of sugar or caffeine, such as energy drinks, sports drinks, and soda.  · Eat bland, easy-to-digest foods in small amounts as you are able. These foods include bananas, applesauce, rice, lean meats, toast, and crackers.  · Avoid alcohol.  · Avoid spicy or fatty foods.    Medicines  · Take over-the-counter and prescription medicines only as told by your health care provider.  · If you were prescribed an antibiotic medicine, take it as told by your health care provider. Do not stop using the antibiotic even if you start to feel better.  General instructions    · Wash your hands often using soap and water. If soap and water are not available, use a hand . Others in the household should wash their hands as well. Hands should be washed:  ? After using the toilet or changing a diaper.  ? Before preparing, cooking, or serving food.  ? While caring for a sick person or while visiting someone in a hospital.  · Drink enough fluid to keep your urine pale  yellow.  · Rest at home while you recover.  · Watch your condition for any changes.  · Take a warm bath to relieve any burning or pain from frequent diarrhea episodes.  · Keep all follow-up visits as told by your health care provider. This is important.    Contact a health care provider if:  · You have a fever.  · Your diarrhea gets worse.  · You have new symptoms.  · You cannot keep fluids down.  · You feel light-headed or dizzy.  · You have a headache.  · You have muscle cramps.  Get help right away if:  · You have chest pain.  · You feel extremely weak or you faint.  · You have bloody or black stools or stools that look like tar.  · You have severe pain, cramping, or bloating in your abdomen.  · You have trouble breathing or you are breathing very quickly.  · Your heart is beating very quickly.  · Your skin feels cold and clammy.  · You feel confused.  · You have signs of dehydration, such as:  ? Dark urine, very little urine, or no urine.  ? Cracked lips.  ? Dry mouth.  ? Sunken eyes.  ? Sleepiness.  ? Weakness.  Summary  · Diarrhea is frequent loose and watery bowel movements. Diarrhea can make you feel weak and cause you to become dehydrated.  · Drink enough fluids to keep your urine pale yellow.  · Make sure that you wash your hands after using the toilet. If soap and water are not available, use hand .  · Contact a health care provider if your diarrhea gets worse or you have new symptoms.  · Get help right away if you have signs of dehydration.  This information is not intended to replace advice given to you by your health care provider. Make sure you discuss any questions you have with your health care provider.  Document Revised: 05/05/2020 Document Reviewed: 05/24/2019  [employer] Patient Education © 2021 [employer]

## 2021-09-26 LAB
ALBUMIN SERPL-MCNC: 4.6 G/DL (ref 3.5–5.2)
ALBUMIN/GLOB SERPL: 2 G/DL
ALP SERPL-CCNC: 200 U/L (ref 39–117)
ALT SERPL-CCNC: 33 U/L (ref 1–33)
AMYLASE SERPL-CCNC: 43 U/L (ref 28–100)
AST SERPL-CCNC: 24 U/L (ref 1–32)
BACTERIA UR CULT: ABNORMAL
BACTERIA UR CULT: ABNORMAL
BASOPHILS # BLD AUTO: 0.07 10*3/MM3 (ref 0–0.2)
BASOPHILS NFR BLD AUTO: 0.8 % (ref 0–1.5)
BILIRUB SERPL-MCNC: 0.6 MG/DL (ref 0–1.2)
BUN SERPL-MCNC: 12 MG/DL (ref 8–23)
BUN/CREAT SERPL: 13.3 (ref 7–25)
CALCIUM SERPL-MCNC: 10.3 MG/DL (ref 8.6–10.5)
CHLORIDE SERPL-SCNC: 101 MMOL/L (ref 98–107)
CO2 SERPL-SCNC: 25 MMOL/L (ref 22–29)
CREAT SERPL-MCNC: 0.9 MG/DL (ref 0.57–1)
EOSINOPHIL # BLD AUTO: 0.12 10*3/MM3 (ref 0–0.4)
EOSINOPHIL NFR BLD AUTO: 1.4 % (ref 0.3–6.2)
ERYTHROCYTE [DISTWIDTH] IN BLOOD BY AUTOMATED COUNT: 14.6 % (ref 12.3–15.4)
GLOBULIN SER CALC-MCNC: 2.3 GM/DL
GLUCOSE SERPL-MCNC: 107 MG/DL (ref 65–99)
HCT VFR BLD AUTO: 50.8 % (ref 34–46.6)
HGB BLD-MCNC: 16.5 G/DL (ref 12–15.9)
IMM GRANULOCYTES # BLD AUTO: 0.03 10*3/MM3 (ref 0–0.05)
IMM GRANULOCYTES NFR BLD AUTO: 0.3 % (ref 0–0.5)
LIPASE SERPL-CCNC: 52 U/L (ref 13–60)
LYMPHOCYTES # BLD AUTO: 1.79 10*3/MM3 (ref 0.7–3.1)
LYMPHOCYTES NFR BLD AUTO: 20.5 % (ref 19.6–45.3)
MCH RBC QN AUTO: 27.8 PG (ref 26.6–33)
MCHC RBC AUTO-ENTMCNC: 32.5 G/DL (ref 31.5–35.7)
MCV RBC AUTO: 85.7 FL (ref 79–97)
MONOCYTES # BLD AUTO: 0.73 10*3/MM3 (ref 0.1–0.9)
MONOCYTES NFR BLD AUTO: 8.3 % (ref 5–12)
NEUTROPHILS # BLD AUTO: 6.01 10*3/MM3 (ref 1.7–7)
NEUTROPHILS NFR BLD AUTO: 68.7 % (ref 42.7–76)
NRBC BLD AUTO-RTO: 0 /100 WBC (ref 0–0.2)
OTHER ANTIBIOTIC SUSC ISLT: ABNORMAL
PLATELET # BLD AUTO: 392 10*3/MM3 (ref 140–450)
POTASSIUM SERPL-SCNC: 4.3 MMOL/L (ref 3.5–5.2)
PROT SERPL-MCNC: 6.9 G/DL (ref 6–8.5)
RBC # BLD AUTO: 5.93 10*6/MM3 (ref 3.77–5.28)
SODIUM SERPL-SCNC: 139 MMOL/L (ref 136–145)
TSH SERPL DL<=0.005 MIU/L-ACNC: 1.01 UIU/ML (ref 0.27–4.2)
WBC # BLD AUTO: 8.75 10*3/MM3 (ref 3.4–10.8)

## 2021-09-27 DIAGNOSIS — R10.84 GENERALIZED ABDOMINAL PAIN: ICD-10-CM

## 2021-09-27 DIAGNOSIS — R19.7 DIARRHEA, UNSPECIFIED TYPE: ICD-10-CM

## 2021-09-27 DIAGNOSIS — N30.90 KLEBSIELLA CYSTITIS: Primary | ICD-10-CM

## 2021-09-27 DIAGNOSIS — B96.1 KLEBSIELLA CYSTITIS: Primary | ICD-10-CM

## 2021-09-27 DIAGNOSIS — R63.4 LOSS OF WEIGHT: ICD-10-CM

## 2021-09-27 RX ORDER — CEFDINIR 300 MG/1
300 CAPSULE ORAL 2 TIMES DAILY
Qty: 14 CAPSULE | Refills: 0 | Status: SHIPPED | OUTPATIENT
Start: 2021-09-27 | End: 2021-10-04

## 2021-09-28 LAB
BACTERIA SPEC CULT: NORMAL
BACTERIA SPEC CULT: NORMAL
C DIFF TOX GENS STL QL NAA+PROBE: NEGATIVE
CAMPYLOBACTER STL CULT: NORMAL
E COLI SXT STL QL IA: NEGATIVE
O+P SPEC MICRO: NORMAL
O+P STL CONC: NORMAL
SALM + SHIG STL CULT: NORMAL

## 2021-09-30 ENCOUNTER — OFFICE VISIT (OUTPATIENT)
Dept: FAMILY MEDICINE CLINIC | Facility: CLINIC | Age: 81
End: 2021-09-30

## 2021-09-30 VITALS
BODY MASS INDEX: 32.89 KG/M2 | HEIGHT: 63 IN | WEIGHT: 185.6 LBS | DIASTOLIC BLOOD PRESSURE: 68 MMHG | HEART RATE: 76 BPM | TEMPERATURE: 97.1 F | OXYGEN SATURATION: 98 % | SYSTOLIC BLOOD PRESSURE: 122 MMHG

## 2021-09-30 DIAGNOSIS — N30.90 KLEBSIELLA CYSTITIS: ICD-10-CM

## 2021-09-30 DIAGNOSIS — N39.0 CHRONIC UTI: ICD-10-CM

## 2021-09-30 DIAGNOSIS — B96.1 KLEBSIELLA CYSTITIS: ICD-10-CM

## 2021-09-30 DIAGNOSIS — G62.9 NEUROPATHY: ICD-10-CM

## 2021-09-30 DIAGNOSIS — F51.04 CHRONIC INSOMNIA: ICD-10-CM

## 2021-09-30 DIAGNOSIS — F32.A DEPRESSION, UNSPECIFIED DEPRESSION TYPE: Primary | ICD-10-CM

## 2021-09-30 PROCEDURE — 99214 OFFICE O/P EST MOD 30 MIN: CPT | Performed by: FAMILY MEDICINE

## 2021-09-30 RX ORDER — DULOXETIN HYDROCHLORIDE 60 MG/1
60 CAPSULE, DELAYED RELEASE ORAL DAILY
Qty: 90 CAPSULE | Refills: 1 | Status: SHIPPED | OUTPATIENT
Start: 2021-09-30 | End: 2022-03-21

## 2021-09-30 NOTE — PROGRESS NOTES
"Chief Complaint  Depression and Peripheral Neuropathy    Subjective          Annalee Melara presents to [facility]  Here for routine check up.  Diarrhea work up was all negative.  Stools are solid now.   Depression not doing well.  She denies suicidal homicidal ideation or hopelessness.  Pj had increased her to 40 mg at her last visit and she like to go up higher.  She still only gets 5 hours of sleep with the 5 mg of Ambien.  She is very resistant to get rid of it and I have reiterated that I will not increase it back up to 10 mg.  The gabapentin at 2 tablets at night really helps her neuropathy.  She has no new complaints today.        Objective   Vital Signs:   /68   Pulse 76   Temp 97.1 °F (36.2 °C)   Ht 160 cm (63\")   Wt 84.2 kg (185 lb 9.6 oz)   SpO2 98%   BMI 32.88 kg/m²     Physical Exam  Constitutional:       General: She is not in acute distress.     Appearance: Normal appearance. She is well-developed.   HENT:      Head: Normocephalic and atraumatic.      Right Ear: External ear normal.      Left Ear: External ear normal.      Mouth/Throat:      Mouth: Mucous membranes are moist.      Pharynx: Oropharynx is clear.   Eyes:      Conjunctiva/sclera: Conjunctivae normal.      Pupils: Pupils are equal, round, and reactive to light.   Neck:      Thyroid: No thyromegaly.   Cardiovascular:      Rate and Rhythm: Normal rate and regular rhythm.      Heart sounds: No murmur heard.     Pulmonary:      Effort: Pulmonary effort is normal.      Breath sounds: Normal breath sounds. No wheezing.   Abdominal:      Palpations: Abdomen is soft.   Musculoskeletal:         General: Normal range of motion.      Cervical back: Neck supple.      Right lower leg: Edema present.      Left lower leg: Edema present.   Lymphadenopathy:      Cervical: No cervical adenopathy.   Skin:     General: Skin is warm and dry.   Neurological:      Mental Status: She is alert and oriented to " person, place, and time. Mental status is at baseline.      Gait: Gait abnormal.      Comments: Gait slow with walker   Psychiatric:         Mood and Affect: Mood normal.         Behavior: Behavior normal.        Result Review :                 Assessment and Plan    Diagnoses and all orders for this visit:    1. Depression, unspecified depression type (Primary)  -     DULoxetine (CYMBALTA) 60 MG capsule; Take 1 capsule by mouth Daily.  Dispense: 90 capsule; Refill: 1    2. Klebsiella cystitis    3. Chronic UTI    4. Chronic insomnia    5. Neuropathy    Depression-not in remission, increase duloxetine  Klebsiella cystitis-finish Omnicef and recheck urine culture next week  Chronic UTI-we will decide on prophylactic medication after follow-up  Chronic insomnia-patient still requiring zolpidem for any sleep  Neuropathy-well-controlled on just the gabapentin at night    Follow Up   Return in about 3 months (around 12/30/2021) for Recheck pain, sleep, Medicare Wellness.  Patient was given instructions and counseling regarding her condition or for health maintenance advice. Please see specific information pulled into the AVS if appropriate.

## 2021-10-06 DIAGNOSIS — F51.04 CHRONIC INSOMNIA: ICD-10-CM

## 2021-10-06 RX ORDER — ZOLPIDEM TARTRATE 5 MG/1
5 TABLET ORAL NIGHTLY PRN
Qty: 30 TABLET | Refills: 0 | Status: SHIPPED | OUTPATIENT
Start: 2021-10-06 | End: 2021-11-06 | Stop reason: SDUPTHER

## 2021-10-07 ENCOUNTER — CLINICAL SUPPORT (OUTPATIENT)
Dept: FAMILY MEDICINE CLINIC | Facility: CLINIC | Age: 81
End: 2021-10-07

## 2021-10-07 ENCOUNTER — LAB (OUTPATIENT)
Dept: FAMILY MEDICINE CLINIC | Facility: CLINIC | Age: 81
End: 2021-10-07

## 2021-10-07 VITALS — TEMPERATURE: 98.4 F

## 2021-10-07 DIAGNOSIS — B96.1 KLEBSIELLA CYSTITIS: ICD-10-CM

## 2021-10-07 DIAGNOSIS — N39.0 CHRONIC UTI: Primary | ICD-10-CM

## 2021-10-07 DIAGNOSIS — N30.90 KLEBSIELLA CYSTITIS: ICD-10-CM

## 2021-10-07 DIAGNOSIS — Z23 IMMUNIZATION DUE: Primary | ICD-10-CM

## 2021-10-07 LAB
BILIRUB BLD-MCNC: ABNORMAL MG/DL
CLARITY, POC: CLEAR
COLOR UR: YELLOW
GLUCOSE UR STRIP-MCNC: ABNORMAL MG/DL
KETONES UR QL: ABNORMAL
LEUKOCYTE EST, POC: ABNORMAL
NITRITE UR-MCNC: POSITIVE MG/ML
PH UR: 5 [PH] (ref 5–8)
PROT UR STRIP-MCNC: ABNORMAL MG/DL
RBC # UR STRIP: ABNORMAL /UL
SP GR UR: 1.01 (ref 1–1.03)
UROBILINOGEN UR QL: ABNORMAL

## 2021-10-07 PROCEDURE — G0008 ADMIN INFLUENZA VIRUS VAC: HCPCS | Performed by: FAMILY MEDICINE

## 2021-10-07 PROCEDURE — 90662 IIV NO PRSV INCREASED AG IM: CPT | Performed by: FAMILY MEDICINE

## 2021-10-07 PROCEDURE — 81003 URINALYSIS AUTO W/O SCOPE: CPT | Performed by: FAMILY MEDICINE

## 2021-10-07 RX ORDER — CEFDINIR 300 MG/1
300 CAPSULE ORAL 2 TIMES DAILY
Qty: 14 CAPSULE | Refills: 0 | Status: SHIPPED | OUTPATIENT
Start: 2021-10-07 | End: 2021-10-12

## 2021-10-11 DIAGNOSIS — N39.0 CHRONIC UTI: Primary | ICD-10-CM

## 2021-10-11 LAB
BACTERIA UR CULT: ABNORMAL
BACTERIA UR CULT: ABNORMAL
OTHER ANTIBIOTIC SUSC ISLT: ABNORMAL

## 2021-10-12 DIAGNOSIS — N39.0 CHRONIC UTI: Primary | ICD-10-CM

## 2021-10-12 RX ORDER — CEPHALEXIN 250 MG/1
250 CAPSULE ORAL DAILY
Qty: 30 CAPSULE | Refills: 0 | Status: SHIPPED | OUTPATIENT
Start: 2021-10-12 | End: 2022-02-14

## 2021-10-14 ENCOUNTER — TELEPHONE (OUTPATIENT)
Dept: FAMILY MEDICINE CLINIC | Facility: CLINIC | Age: 81
End: 2021-10-14

## 2021-10-14 NOTE — TELEPHONE ENCOUNTER
Caller: Annalee Melara    Relationship: Self    Best call back number: [phone]      Who are you requesting to speak with (clinical staff, provider,  specific staff member): NURSE    What was the call regarding: PATIENT HAS ONE MORE CIFID ANTIBIOTIC (SP) TO TAKE AND IT HAS NOT HELPED THE UTI AT ALL. SHE WOULD LIKE SOMETHING ELSE CALLED IN.     Do you require a callback: YES    [facility] [address] [place] [address] - [phone] [facility] [phone]   [phone]

## 2021-10-14 NOTE — TELEPHONE ENCOUNTER
Spoke with pt and she has 1 more omnicef pill left then she is to start the keflex.  She said that today she feels like the uti is coming back. She does not go to the urologist until 10/28/21. Wants to know what to do ?  Please advise

## 2021-10-15 ENCOUNTER — LAB (OUTPATIENT)
Dept: FAMILY MEDICINE CLINIC | Facility: CLINIC | Age: 81
End: 2021-10-15

## 2021-10-15 DIAGNOSIS — N39.0 CHRONIC UTI: Primary | ICD-10-CM

## 2021-10-15 LAB
BILIRUB BLD-MCNC: ABNORMAL MG/DL
CLARITY, POC: ABNORMAL
COLOR UR: ABNORMAL
EXPIRATION DATE: ABNORMAL
GLUCOSE UR STRIP-MCNC: ABNORMAL MG/DL
KETONES UR QL: ABNORMAL
LEUKOCYTE EST, POC: ABNORMAL
Lab: ABNORMAL
NITRITE UR-MCNC: POSITIVE MG/ML
PH UR: 6 [PH] (ref 5–8)
PROT UR STRIP-MCNC: ABNORMAL MG/DL
RBC # UR STRIP: ABNORMAL /UL
SP GR UR: 1.01 (ref 1–1.03)
UROBILINOGEN UR QL: ABNORMAL

## 2021-10-15 PROCEDURE — 81003 URINALYSIS AUTO W/O SCOPE: CPT | Performed by: FAMILY MEDICINE

## 2021-10-18 ENCOUNTER — TELEPHONE (OUTPATIENT)
Dept: FAMILY MEDICINE CLINIC | Facility: CLINIC | Age: 81
End: 2021-10-18

## 2021-10-18 NOTE — TELEPHONE ENCOUNTER
Left message per verbal release that her urine culture was not back yet, and we will call her once it returns

## 2021-10-18 NOTE — TELEPHONE ENCOUNTER
PATIENT SAYS THE KEFLEX IS NOT WORKING FOR HER UTI. WANTING TO KNOW IF YOU GOT CULTURE BACK.    PLEASE ADVISE  [phone]

## 2021-10-19 ENCOUNTER — TELEPHONE (OUTPATIENT)
Dept: FAMILY MEDICINE CLINIC | Facility: CLINIC | Age: 81
End: 2021-10-19

## 2021-10-19 NOTE — TELEPHONE ENCOUNTER
The order was released, it should have been collected.  I called [facility], they are faxing over preliminary results, they are waiting on sensitivity.

## 2021-10-19 NOTE — TELEPHONE ENCOUNTER
Pt notified that her urine culture results were not back yet.  I called [facility] this morning and her preliminary results are back but they are waiting to see what it is sensitive to. Pt advised that when her results come in we will give her a call

## 2021-10-19 NOTE — TELEPHONE ENCOUNTER
Caller: Annalee Melara    Relationship: Self    Best call back number: [phone]    Caller requesting test results: PATIENT    What test was performed: URINALYSIS    When was the test performed: 10/15/21    Where was the test performed:     Additional notes: PATIENT IS SEEKING RESULTS OF URINALYSIS. PATIENT STATED THE ANTIBIOTICS ARE NOT WORKING. PLEASE CALL PATIENT.

## 2021-10-20 DIAGNOSIS — N30.80 CYSTITIS DUE TO PSEUDOMONAS: Primary | ICD-10-CM

## 2021-10-20 DIAGNOSIS — B96.5 CYSTITIS DUE TO PSEUDOMONAS: Primary | ICD-10-CM

## 2021-10-20 LAB
BACTERIA UR CULT: ABNORMAL
BACTERIA UR CULT: ABNORMAL
OTHER ANTIBIOTIC SUSC ISLT: ABNORMAL

## 2021-10-20 RX ORDER — CIPROFLOXACIN 500 MG/1
500 TABLET, FILM COATED ORAL 2 TIMES DAILY
Qty: 14 TABLET | Refills: 0 | Status: SHIPPED | OUTPATIENT
Start: 2021-10-20 | End: 2021-10-27

## 2021-10-20 RX ORDER — PREDNISOLONE ACETATE 10 MG/ML
SUSPENSION/ DROPS OPHTHALMIC
OUTPATIENT
Start: 2021-10-20

## 2021-11-05 DIAGNOSIS — F51.04 CHRONIC INSOMNIA: ICD-10-CM

## 2021-11-06 RX ORDER — ZOLPIDEM TARTRATE 5 MG/1
5 TABLET ORAL NIGHTLY PRN
Qty: 30 TABLET | Refills: 0 | Status: SHIPPED | OUTPATIENT
Start: 2021-11-06 | End: 2021-11-08 | Stop reason: SDUPTHER

## 2021-11-08 DIAGNOSIS — F51.04 CHRONIC INSOMNIA: ICD-10-CM

## 2021-11-08 RX ORDER — ZOLPIDEM TARTRATE 5 MG/1
5 TABLET ORAL NIGHTLY PRN
Qty: 30 TABLET | Refills: 0 | Status: SHIPPED | OUTPATIENT
Start: 2021-11-08 | End: 2021-12-06

## 2021-11-10 DIAGNOSIS — I26.99 PULMONARY EMBOLISM, UNSPECIFIED CHRONICITY, UNSPECIFIED PULMONARY EMBOLISM TYPE, UNSPECIFIED WHETHER ACUTE COR PULMONALE PRESENT (HCC): ICD-10-CM

## 2021-11-10 RX ORDER — EZETIMIBE 10 MG/1
10 TABLET ORAL DAILY
Qty: 30 TABLET | Refills: 2 | Status: SHIPPED | OUTPATIENT
Start: 2021-11-10 | End: 2022-02-08

## 2021-11-10 RX ORDER — LISINOPRIL 10 MG/1
10 TABLET ORAL DAILY
Qty: 90 TABLET | Refills: 1 | Status: SHIPPED | OUTPATIENT
Start: 2021-11-10 | End: 2022-08-15

## 2021-11-10 RX ORDER — RIVAROXABAN 20 MG/1
TABLET, FILM COATED ORAL
Qty: 90 TABLET | Refills: 0 | Status: SHIPPED | OUTPATIENT
Start: 2021-11-10 | End: 2021-11-10 | Stop reason: SDUPTHER

## 2021-12-06 DIAGNOSIS — F51.04 CHRONIC INSOMNIA: ICD-10-CM

## 2021-12-06 RX ORDER — ZOLPIDEM TARTRATE 5 MG/1
TABLET ORAL
Qty: 30 TABLET | Refills: 0 | Status: SHIPPED | OUTPATIENT
Start: 2021-12-06 | End: 2021-12-30 | Stop reason: SDUPTHER

## 2021-12-30 DIAGNOSIS — F51.04 CHRONIC INSOMNIA: ICD-10-CM

## 2021-12-30 RX ORDER — ZOLPIDEM TARTRATE 5 MG/1
TABLET ORAL
Qty: 30 TABLET | Refills: 0 | Status: SHIPPED | OUTPATIENT
Start: 2021-12-30 | End: 2022-01-03

## 2022-01-02 DIAGNOSIS — F51.04 CHRONIC INSOMNIA: ICD-10-CM

## 2022-01-03 RX ORDER — ZOLPIDEM TARTRATE 5 MG/1
TABLET ORAL
Qty: 30 TABLET | Refills: 0 | Status: SHIPPED | OUTPATIENT
Start: 2022-01-03 | End: 2022-02-02

## 2022-02-02 DIAGNOSIS — F51.04 CHRONIC INSOMNIA: ICD-10-CM

## 2022-02-02 RX ORDER — ZOLPIDEM TARTRATE 5 MG/1
TABLET ORAL
Qty: 30 TABLET | Refills: 0 | Status: SHIPPED | OUTPATIENT
Start: 2022-02-02 | End: 2022-03-07

## 2022-02-08 DIAGNOSIS — I26.99 PULMONARY EMBOLISM, UNSPECIFIED CHRONICITY, UNSPECIFIED PULMONARY EMBOLISM TYPE, UNSPECIFIED WHETHER ACUTE COR PULMONALE PRESENT: ICD-10-CM

## 2022-02-08 RX ORDER — EZETIMIBE 10 MG/1
TABLET ORAL
Qty: 30 TABLET | Refills: 0 | Status: SHIPPED | OUTPATIENT
Start: 2022-02-08

## 2022-02-08 RX ORDER — RIVAROXABAN 20 MG/1
TABLET, FILM COATED ORAL
Qty: 90 TABLET | Refills: 0 | Status: SHIPPED | OUTPATIENT
Start: 2022-02-08 | End: 2022-05-06 | Stop reason: SDUPTHER

## 2022-02-14 ENCOUNTER — OFFICE VISIT (OUTPATIENT)
Dept: FAMILY MEDICINE CLINIC | Facility: CLINIC | Age: 82
End: 2022-02-14

## 2022-02-14 VITALS
TEMPERATURE: 97.3 F | OXYGEN SATURATION: 96 % | WEIGHT: 174.3 LBS | HEART RATE: 78 BPM | HEIGHT: 63 IN | DIASTOLIC BLOOD PRESSURE: 76 MMHG | BODY MASS INDEX: 30.88 KG/M2 | SYSTOLIC BLOOD PRESSURE: 128 MMHG

## 2022-02-14 DIAGNOSIS — G62.9 NEUROPATHY: ICD-10-CM

## 2022-02-14 DIAGNOSIS — Z00.00 MEDICARE ANNUAL WELLNESS VISIT, SUBSEQUENT: Primary | ICD-10-CM

## 2022-02-14 DIAGNOSIS — S32.000S COMPRESSION FRACTURE OF LUMBAR VERTEBRA, UNSPECIFIED LUMBAR VERTEBRAL LEVEL, SEQUELA: ICD-10-CM

## 2022-02-14 DIAGNOSIS — G31.84 MINIMAL COGNITIVE IMPAIRMENT: ICD-10-CM

## 2022-02-14 DIAGNOSIS — I10 ESSENTIAL HYPERTENSION: ICD-10-CM

## 2022-02-14 DIAGNOSIS — F51.04 CHRONIC INSOMNIA: ICD-10-CM

## 2022-02-14 DIAGNOSIS — N39.0 CHRONIC UTI: ICD-10-CM

## 2022-02-14 DIAGNOSIS — R07.81 RIB PAIN ON RIGHT SIDE: ICD-10-CM

## 2022-02-14 LAB
BILIRUB BLD-MCNC: ABNORMAL MG/DL
CLARITY, POC: CLEAR
COLOR UR: ABNORMAL
EXPIRATION DATE: ABNORMAL
GLUCOSE UR STRIP-MCNC: NEGATIVE MG/DL
KETONES UR QL: ABNORMAL
LEUKOCYTE EST, POC: ABNORMAL
Lab: ABNORMAL
NITRITE UR-MCNC: POSITIVE MG/ML
PH UR: 6 [PH] (ref 5–8)
PROT UR STRIP-MCNC: ABNORMAL MG/DL
RBC # UR STRIP: ABNORMAL /UL
SP GR UR: 1.02 (ref 1–1.03)
UROBILINOGEN UR QL: NORMAL

## 2022-02-14 PROCEDURE — G0439 PPPS, SUBSEQ VISIT: HCPCS | Performed by: FAMILY MEDICINE

## 2022-02-14 PROCEDURE — 96160 PT-FOCUSED HLTH RISK ASSMT: CPT | Performed by: FAMILY MEDICINE

## 2022-02-14 PROCEDURE — 81003 URINALYSIS AUTO W/O SCOPE: CPT | Performed by: FAMILY MEDICINE

## 2022-02-14 PROCEDURE — 1159F MED LIST DOCD IN RCRD: CPT | Performed by: FAMILY MEDICINE

## 2022-02-14 PROCEDURE — 1170F FXNL STATUS ASSESSED: CPT | Performed by: FAMILY MEDICINE

## 2022-02-14 PROCEDURE — 99214 OFFICE O/P EST MOD 30 MIN: CPT | Performed by: FAMILY MEDICINE

## 2022-02-14 RX ORDER — IBANDRONATE SODIUM 150 MG/1
150 TABLET, FILM COATED ORAL
Qty: 3 TABLET | Refills: 3 | Status: SHIPPED | OUTPATIENT
Start: 2022-02-14

## 2022-02-14 NOTE — PATIENT INSTRUCTIONS
Medicare Wellness  Personal Prevention Plan of Service     Date of Office Visit:  2022  Encounter Provider:  Meredith Lea Kehrer, MD  Place of Service:  [facility]  Patient Name: Annalee Melara  :  1940    As part of the Medicare Wellness portion of your visit today, we are providing you with this personalized preventive plan of services (PPPS). This plan is based upon recommendations of the [place] Preventive Services Task Force (USPSTF) and the Advisory Committee on Immunization Practices (ACIP).    This lists the preventive care services that should be considered, and provides dates of when you are due. Items listed as completed are up-to-date and do not require any further intervention.    Health Maintenance   Topic Date Due   • TDAP/TD VACCINES (1 - Tdap) Never done   • ANNUAL WELLNESS VISIT  Never done   • LIPID PANEL  2021   • DXA SCAN  2023   • COVID-19 Vaccine  Completed   • INFLUENZA VACCINE  Completed   • Pneumococcal Vaccine 65+  Completed   • ZOSTER VACCINE  Completed       No orders of the defined types were placed in this encounter.      No follow-ups on file.

## 2022-02-14 NOTE — PROGRESS NOTES
The ABCs of the Annual Wellness Visit  Subsequent Medicare Wellness Visit    Chief Complaint   Patient presents with   • Medicare Wellness-subsequent      Subjective    History of Present Illness:  Annalee Melara is a 81 y.o. female who presents for a Subsequent Medicare Wellness Visit.  Patient presents for her Medicare wellness as well as follow-up on her history of hypercoagulable state, hypertension, osteoporosis with compression fractures, chronic insomnia, neuropathy.  She is compliant with her medications as listed.  She like to go back to [facility] because of her leg swelling she has a really hard time sitting up straight for an hour once a week.  I explained to the patient that Boniva does not have the outcomes data that Fosamax does but she was insistent.  She also complained of only getting 2 to 3 hours of night and a 5 mg Ambien because I will give her the 10 mg 1 and mention going back to her previous physicians.  At that I told her that she could do that however I would not increase her medication and would not be responsible for her having further falls with another hip fracture or even worse consequences.  I have mentioned to the patient several times in the past about going to be see a sleep specialist and she has been resistant.  They have even mentioned her seeing a geriatric psychiatrist.  The following portions of the patient's history were reviewed and   updated as appropriate: allergies, current medications, past family history, past medical history, past social history, past surgical history and problem list.    Compared to one year ago, the patient feels her physical   health is worse.    Compared to one year ago, the patient feels her mental   health is the same.    Recent Hospitalizations:  She was admitted within the past 365 days at [facility].       Current Medical Providers:  Patient Care Team:  Kehrer, Meredith Lea, MD as PCP - General (Family Medicine)    Outpatient  Medications Prior to Visit   Medication Sig Dispense Refill   • albuterol sulfate  (90 Base) MCG/ACT inhaler Inhale 2 puffs Every 4 (Four) Hours As Needed for Shortness of Air (cough). 6.7 g 2   • benzonatate (TESSALON) 200 MG capsule Take 1 capsule by mouth 3 (Three) Times a Day As Needed for Cough. 30 capsule 0   • Combigan 0.2-0.5 % ophthalmic solution      • DULoxetine (CYMBALTA) 60 MG capsule Take 1 capsule by mouth Daily. 90 capsule 1   • ezetimibe (ZETIA) 10 MG tablet Take 1 tablet by mouth once daily 30 tablet 0   • fluticasone (FLONASE) 50 MCG/ACT nasal spray 2 sprays into the nostril(s) as directed by provider Daily. 16 g 2   • gabapentin (NEURONTIN) 100 MG capsule Take 2 capsules by mouth 2 (two) times a day. 120 capsule 2   • galantamine (RAZADYNE) 8 MG tablet Take 8 mg by mouth.     • latanoprost (XALATAN) 0.005 % ophthalmic solution INSTILL 1 DROP INTO EACH EYE AT BEDTIME     • lisinopril (PRINIVIL,ZESTRIL) 10 MG tablet Take 1 tablet by mouth Daily. 90 tablet 1   • memantine (NAMENDA) 10 MG tablet TAKE 1 TABLET BY MOUTH TWICE DAILY     • omeprazole (priLOSEC) 40 MG capsule Take 1 capsule by mouth Every Morning. 30 capsule 2   • prednisoLONE acetate (PRED FORTE) 1 % ophthalmic suspension INSTILL 1 DROP INTO EACH EYE 4 TIMES DAILY THEN FOLLOW TAPER SCHEDULE     • simvastatin (ZOCOR) 20 MG tablet TAKE 1 TABLET BY MOUTH ONCE DAILY IN THE EVENING     • Xarelto 20 MG tablet Take 1 tablet by mouth once daily 90 tablet 0   • zolpidem (AMBIEN) 5 MG tablet TAKE 1 TABLET BY MOUTH AT NIGHT AS NEEDED FOR SLEEP 30 tablet 0   • montelukast (Singulair) 10 MG tablet Take 1 tablet by mouth Every Night.     • trimethoprim-polymyxin b (POLYTRIM) 00747-3.1 UNIT/ML-% ophthalmic solution      • alendronate (Fosamax) 70 MG tablet Take 1 tablet by mouth Every 7 (Seven) Days. 13 tablet 3   • cephalexin (Keflex) 250 MG capsule Take 1 capsule by mouth Daily. 30 capsule 0   • promethazine-dextromethorphan (PROMETHAZINE-DM)  6.25-15 MG/5ML syrup Take 5 mL by mouth At Night As Needed for Cough. 118 mL 0     No facility-administered medications prior to visit.       No opioid medication identified on active medication list. I have reviewed chart for other potential  high risk medication/s and harmful drug interactions in the elderly.          Aspirin is not on active medication list.  Aspirin use is not indicated based on review of current medical condition/s. Risk of harm outweighs potential benefits.  .    Patient Active Problem List   Diagnosis   • Current mild episode of major depressive disorder ([facility])   • Essential hypertension   • Gastroesophageal reflux disease without esophagitis   • Insomnia   • Minimal cognitive impairment   • Mixed hyperlipidemia   • Neuropathy due to peripheral vascular disease ([facility])   • Other pulmonary embolism with acute cor pulmonale ([facility])   • Pulmonary embolism ([facility])   • Closed intertrochanteric fracture of left hip, initial encounter ([facility])   • Fall   • Anisometropia   • Astigmatism of both eyes   • Chronic open angle glaucoma of both eyes, mild stage   • 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  ACP discussion was held with the patient during this visit. Patient has an advance directive (not in EMR), copy requested.    Review of Systems   Constitutional: Negative for chills, fatigue and fever.   HENT: Negative for congestion, ear pain and sore throat.    Eyes: Negative for visual disturbance.   Respiratory: Negative for cough and shortness of breath.    Cardiovascular: Negative for chest pain, palpitations and leg swelling.   Gastrointestinal: Negative for diarrhea, nausea and vomiting.   Endocrine: Negative.    Genitourinary: Negative for dysuria and frequency.   Musculoskeletal: Positive for arthralgias and back pain.   Skin: Negative.    Psychiatric/Behavioral: Positive for sleep  "disturbance. Negative for dysphoric mood. The patient is not nervous/anxious.         Objective    Vitals:    02/14/22 1456   BP: 128/76   Pulse: 78   Temp: 97.3 °F (36.3 °C)   SpO2: 96%   Weight: 79.1 kg (174 lb 4.8 oz)   Height: 160 cm (63\")     BMI Readings from Last 1 Encounters:   02/14/22 30.88 kg/m²   BMI is above normal parameters. Recommendations include: nutrition counseling    Does the patient have evidence of cognitive impairment? Yes    Physical Exam  Vitals and nursing note reviewed.   Constitutional:       General: She is not in acute distress.     Appearance: Normal appearance. She is well-developed. She is obese.   HENT:      Head: Normocephalic and atraumatic.      Right Ear: Tympanic membrane, ear canal and external ear normal.      Left Ear: Tympanic membrane, ear canal and external ear normal.      Nose: Nose normal.      Mouth/Throat:      Mouth: Mucous membranes are moist.      Pharynx: Oropharynx is clear. No oropharyngeal exudate or posterior oropharyngeal erythema.   Eyes:      Conjunctiva/sclera: Conjunctivae normal.      Pupils: Pupils are equal, round, and reactive to light.   Neck:      Thyroid: No thyromegaly.   Cardiovascular:      Rate and Rhythm: Normal rate and regular rhythm.      Heart sounds: No murmur heard.      Pulmonary:      Effort: Pulmonary effort is normal.      Breath sounds: Normal breath sounds. No wheezing.   Abdominal:      General: Abdomen is flat. Bowel sounds are normal. There is no distension.      Palpations: Abdomen is soft. There is no mass.      Tenderness: There is no abdominal tenderness.      Hernia: No hernia is present.   Musculoskeletal:         General: No swelling. Normal range of motion.      Cervical back: Normal range of motion and neck supple.      Right lower leg: No edema.      Left lower leg: No edema.   Lymphadenopathy:      Cervical: No cervical adenopathy.   Skin:     General: Skin is warm and dry.      Capillary Refill: Capillary refill " takes less than 2 seconds.      Findings: No rash.   Neurological:      General: No focal deficit present.      Mental Status: She is alert and oriented to person, place, and time.      Cranial Nerves: No cranial nerve deficit.      Gait: Gait abnormal.      Comments: Gait with cane   Psychiatric:         Mood and Affect: Mood normal.         Behavior: Behavior normal.                 HEALTH RISK ASSESSMENT    Smoking Status:  Social History     Tobacco Use   Smoking Status Never Smoker   Smokeless Tobacco Never Used     Alcohol Consumption:  Social History     Substance and Sexual Activity   Alcohol Use Not Currently     Fall Risk Screen:    STEADI Fall Risk Assessment was completed, and patient is at HIGH risk for falls. Assessment completed on:2/14/2022    Depression Screening:  PHQ-2/PHQ-9 Depression Screening 2/14/2022   Little interest or pleasure in doing things 1   Feeling down, depressed, or hopeless 0   Total Score 1       Health Habits and Functional and Cognitive Screening:  Functional & Cognitive Status 2/14/2022   Do you have difficulty preparing food and eating? Yes   Do you have difficulty bathing yourself, getting dressed or grooming yourself? No   Do you have difficulty using the toilet? No   Do you have difficulty moving around from place to place? No   Do you have trouble with steps or getting out of a bed or a chair? Yes   Current Diet Well Balanced Diet   Dental Exam Up to date   Eye Exam Up to date   Exercise (times per week) 0 times per week   Current Exercises Include No Regular Exercise   Do you need help using the phone?  No   Are you deaf or do you have serious difficulty hearing?  No   Do you need help with transportation? Yes   Do you need help shopping? Yes   Do you need help preparing meals?  Yes   Do you need help with housework?  Yes   Do you need help with laundry? Yes   Do you need help taking your medications? No   Do you need help managing money? No   Do you ever drive or ride  in a car without wearing a seat belt? No       Age-appropriate Screening Schedule:  Refer to the list below for future screening recommendations based on patient's age, sex and/or medical conditions. Orders for these recommended tests are listed in the plan section. The patient has been provided with a written plan.    Health Maintenance   Topic Date Due   • TDAP/TD VACCINES (1 - Tdap) Never done   • LIPID PANEL  03/24/2021   • DXA SCAN  03/16/2023   • INFLUENZA VACCINE  Completed   • ZOSTER VACCINE  Completed              Assessment/Plan   CMS Preventative Services Quick Reference  Risk Factors Identified During Encounter  Depression/Dysphoria  The above risks/problems have been discussed with the patient.  Follow up actions/plans if indicated are seen below in the Assessment/Plan Section.  Pertinent information has been shared with the patient in the After Visit Summary.    Diagnoses and all orders for this visit:    1. Medicare annual wellness visit, subsequent (Primary)    2. Chronic insomnia    3. Neuropathy    4. Essential hypertension  -     Lipid Panel  -     CBC & Differential  -     Comprehensive Metabolic Panel  -     TSH  -     Vitamin B12    5. Compression fracture of lumbar vertebra, unspecified lumbar vertebral level, sequela  -     ibandronate (Boniva) 150 MG tablet; Take 1 tablet by mouth Every 30 (Thirty) Days.  Dispense: 3 tablet; Refill: 3    6. Minimal cognitive impairment    7. Chronic UTI  -     POC Urinalysis Dipstick, Automated  -     Urine Culture - Urine, Urine, Clean Catch; Future  -     Urine Culture - Urine, Urine, Clean Catch    8. Rib pain on right side        Follow Up:   No follow-ups on file.     An After Visit Summary and PPPS were made available to the patient.

## 2022-02-15 LAB
ALBUMIN SERPL-MCNC: 4.1 G/DL (ref 3.6–4.6)
ALBUMIN/GLOB SERPL: 1.6 {RATIO} (ref 1.2–2.2)
ALP SERPL-CCNC: 356 IU/L (ref 44–121)
ALT SERPL-CCNC: 187 IU/L (ref 0–32)
AST SERPL-CCNC: 110 IU/L (ref 0–40)
BASOPHILS # BLD AUTO: 0.1 X10E3/UL (ref 0–0.2)
BASOPHILS NFR BLD AUTO: 1 %
BILIRUB SERPL-MCNC: 1.1 MG/DL (ref 0–1.2)
BUN SERPL-MCNC: 13 MG/DL (ref 8–27)
BUN/CREAT SERPL: 16 (ref 12–28)
CALCIUM SERPL-MCNC: 9.5 MG/DL (ref 8.7–10.3)
CHLORIDE SERPL-SCNC: 99 MMOL/L (ref 96–106)
CHOLEST SERPL-MCNC: 236 MG/DL (ref 100–199)
CO2 SERPL-SCNC: 22 MMOL/L (ref 20–29)
CREAT SERPL-MCNC: 0.79 MG/DL (ref 0.57–1)
EOSINOPHIL # BLD AUTO: 0.1 X10E3/UL (ref 0–0.4)
EOSINOPHIL NFR BLD AUTO: 1 %
ERYTHROCYTE [DISTWIDTH] IN BLOOD BY AUTOMATED COUNT: 12.6 % (ref 11.7–15.4)
GLOBULIN SER CALC-MCNC: 2.5 G/DL (ref 1.5–4.5)
GLUCOSE SERPL-MCNC: 104 MG/DL (ref 65–99)
HCT VFR BLD AUTO: 44 % (ref 34–46.6)
HDLC SERPL-MCNC: 85 MG/DL
HGB BLD-MCNC: 15.1 G/DL (ref 11.1–15.9)
IMM GRANULOCYTES # BLD AUTO: 0 X10E3/UL (ref 0–0.1)
IMM GRANULOCYTES NFR BLD AUTO: 0 %
LDLC SERPL CALC-MCNC: 121 MG/DL (ref 0–99)
LYMPHOCYTES # BLD AUTO: 1.3 X10E3/UL (ref 0.7–3.1)
LYMPHOCYTES NFR BLD AUTO: 13 %
MCH RBC QN AUTO: 29.6 PG (ref 26.6–33)
MCHC RBC AUTO-ENTMCNC: 34.3 G/DL (ref 31.5–35.7)
MCV RBC AUTO: 86 FL (ref 79–97)
MONOCYTES # BLD AUTO: 0.8 X10E3/UL (ref 0.1–0.9)
MONOCYTES NFR BLD AUTO: 8 %
NEUTROPHILS # BLD AUTO: 8 X10E3/UL (ref 1.4–7)
NEUTROPHILS NFR BLD AUTO: 77 %
PLATELET # BLD AUTO: 282 X10E3/UL (ref 150–450)
POTASSIUM SERPL-SCNC: 4.3 MMOL/L (ref 3.5–5.2)
PROT SERPL-MCNC: 6.6 G/DL (ref 6–8.5)
RBC # BLD AUTO: 5.1 X10E6/UL (ref 3.77–5.28)
SODIUM SERPL-SCNC: 137 MMOL/L (ref 134–144)
TRIGL SERPL-MCNC: 176 MG/DL (ref 0–149)
TSH SERPL DL<=0.005 MIU/L-ACNC: 0.91 UIU/ML (ref 0.45–4.5)
VIT B12 SERPL-MCNC: 650 PG/ML (ref 232–1245)
VLDLC SERPL CALC-MCNC: 30 MG/DL (ref 5–40)
WBC # BLD AUTO: 10.3 X10E3/UL (ref 3.4–10.8)

## 2022-02-17 LAB
BACTERIA UR CULT: ABNORMAL
BACTERIA UR CULT: ABNORMAL
OTHER ANTIBIOTIC SUSC ISLT: ABNORMAL

## 2022-02-18 DIAGNOSIS — N30.00 ACUTE CYSTITIS WITHOUT HEMATURIA: Primary | ICD-10-CM

## 2022-02-18 RX ORDER — CEPHALEXIN 500 MG/1
500 CAPSULE ORAL 2 TIMES DAILY
Qty: 14 CAPSULE | Refills: 0 | Status: SHIPPED | OUTPATIENT
Start: 2022-02-18 | End: 2022-02-25

## 2022-03-02 ENCOUNTER — LAB (OUTPATIENT)
Dept: FAMILY MEDICINE CLINIC | Facility: CLINIC | Age: 82
End: 2022-03-02

## 2022-03-02 ENCOUNTER — TELEPHONE (OUTPATIENT)
Dept: FAMILY MEDICINE CLINIC | Facility: CLINIC | Age: 82
End: 2022-03-02

## 2022-03-02 DIAGNOSIS — R30.9 PAIN WITH URINATION: Primary | ICD-10-CM

## 2022-03-02 LAB
BILIRUB BLD-MCNC: NEGATIVE MG/DL
CLARITY, POC: CLEAR
COLOR UR: YELLOW
EXPIRATION DATE: ABNORMAL
GLUCOSE UR STRIP-MCNC: NEGATIVE MG/DL
KETONES UR QL: NEGATIVE
LEUKOCYTE EST, POC: ABNORMAL
Lab: ABNORMAL
NITRITE UR-MCNC: NEGATIVE MG/ML
PH UR: 6 [PH] (ref 5–8)
PROT UR STRIP-MCNC: NEGATIVE MG/DL
RBC # UR STRIP: NEGATIVE /UL
SP GR UR: 1.01 (ref 1–1.03)
UROBILINOGEN UR QL: NORMAL

## 2022-03-02 PROCEDURE — 81003 URINALYSIS AUTO W/O SCOPE: CPT | Performed by: FAMILY MEDICINE

## 2022-03-02 NOTE — TELEPHONE ENCOUNTER
PATIENT CALLED AND STATES SHE FINISHED THE MEDICATION FOR HER UTI. SHE FEELS THAT IT DID NOT WORK AND WOULD LIKE TO DROP OFF A URINE SAMPLE.    PLEASE CALL BACK NUMBER [phone]

## 2022-03-07 ENCOUNTER — TELEPHONE (OUTPATIENT)
Dept: FAMILY MEDICINE CLINIC | Facility: CLINIC | Age: 82
End: 2022-03-07

## 2022-03-07 DIAGNOSIS — F51.04 CHRONIC INSOMNIA: ICD-10-CM

## 2022-03-07 LAB
BACTERIA UR CULT: ABNORMAL
BACTERIA UR CULT: ABNORMAL
OTHER ANTIBIOTIC SUSC ISLT: ABNORMAL

## 2022-03-07 RX ORDER — ZOLPIDEM TARTRATE 5 MG/1
TABLET ORAL
Qty: 30 TABLET | Refills: 0 | Status: SHIPPED | OUTPATIENT
Start: 2022-03-07 | End: 2022-04-06

## 2022-03-07 RX ORDER — NITROFURANTOIN 25; 75 MG/1; MG/1
100 CAPSULE ORAL 2 TIMES DAILY
Qty: 14 CAPSULE | Refills: 0 | Status: SHIPPED | OUTPATIENT
Start: 2022-03-07 | End: 2022-05-05 | Stop reason: SDUPTHER

## 2022-03-07 NOTE — TELEPHONE ENCOUNTER
We will call with any changes needed based on culture.  If continued pain she needs to follow-up for an appointment. I was going to send in antibiotic but on her allergy list she has macrobid as an allergy noted.   Please clarify this with her

## 2022-03-09 DIAGNOSIS — N39.0 URINARY TRACT INFECTION WITHOUT HEMATURIA, SITE UNSPECIFIED: Primary | ICD-10-CM

## 2022-03-09 RX ORDER — SULFAMETHOXAZOLE AND TRIMETHOPRIM 800; 160 MG/1; MG/1
1 TABLET ORAL 2 TIMES DAILY
Qty: 10 TABLET | Refills: 0 | Status: SHIPPED | OUTPATIENT
Start: 2022-03-09 | End: 2022-03-14

## 2022-03-21 DIAGNOSIS — F32.A DEPRESSION, UNSPECIFIED DEPRESSION TYPE: ICD-10-CM

## 2022-03-21 RX ORDER — DULOXETIN HYDROCHLORIDE 60 MG/1
CAPSULE, DELAYED RELEASE ORAL
Qty: 90 CAPSULE | Refills: 0 | Status: SHIPPED | OUTPATIENT
Start: 2022-03-21 | End: 2022-06-20

## 2022-04-04 ENCOUNTER — TELEPHONE (OUTPATIENT)
Dept: FAMILY MEDICINE CLINIC | Facility: CLINIC | Age: 82
End: 2022-04-04

## 2022-04-06 DIAGNOSIS — F51.04 CHRONIC INSOMNIA: ICD-10-CM

## 2022-04-06 RX ORDER — ZOLPIDEM TARTRATE 5 MG/1
TABLET ORAL
Qty: 30 TABLET | Refills: 0 | Status: SHIPPED | OUTPATIENT
Start: 2022-04-06 | End: 2022-05-05 | Stop reason: SDUPTHER

## 2022-05-04 DIAGNOSIS — F51.04 CHRONIC INSOMNIA: ICD-10-CM

## 2022-05-04 RX ORDER — ZOLPIDEM TARTRATE 5 MG/1
TABLET ORAL
Qty: 30 TABLET | Refills: 0 | OUTPATIENT
Start: 2022-05-04

## 2022-05-05 ENCOUNTER — OFFICE VISIT (OUTPATIENT)
Dept: FAMILY MEDICINE CLINIC | Facility: CLINIC | Age: 82
End: 2022-05-05

## 2022-05-05 VITALS
DIASTOLIC BLOOD PRESSURE: 78 MMHG | OXYGEN SATURATION: 98 % | WEIGHT: 171.2 LBS | TEMPERATURE: 97.3 F | SYSTOLIC BLOOD PRESSURE: 140 MMHG | BODY MASS INDEX: 30.33 KG/M2 | HEIGHT: 63 IN | HEART RATE: 76 BPM

## 2022-05-05 DIAGNOSIS — I10 ESSENTIAL HYPERTENSION: ICD-10-CM

## 2022-05-05 DIAGNOSIS — R74.01 TRANSAMINITIS: ICD-10-CM

## 2022-05-05 DIAGNOSIS — Z79.899 HIGH RISK MEDICATION USE: ICD-10-CM

## 2022-05-05 DIAGNOSIS — F51.04 CHRONIC INSOMNIA: Primary | ICD-10-CM

## 2022-05-05 DIAGNOSIS — N39.0 CHRONIC UTI: ICD-10-CM

## 2022-05-05 PROCEDURE — 99214 OFFICE O/P EST MOD 30 MIN: CPT | Performed by: FAMILY MEDICINE

## 2022-05-05 RX ORDER — NITROFURANTOIN 25; 75 MG/1; MG/1
100 CAPSULE ORAL DAILY
Qty: 90 CAPSULE | Refills: 1 | Status: SHIPPED | OUTPATIENT
Start: 2022-05-05 | End: 2022-10-13

## 2022-05-05 RX ORDER — ZOLPIDEM TARTRATE 5 MG/1
5 TABLET ORAL NIGHTLY PRN
Qty: 30 TABLET | Refills: 2 | Status: SHIPPED | OUTPATIENT
Start: 2022-05-05 | End: 2022-08-01

## 2022-05-05 NOTE — PROGRESS NOTES
"Chief Complaint  Med Refill, Insomnia, and Hypertension    Subjective          Annalee Melara presents to [facility]  Patient presents for follow-up on her insomnia.  Unlike last visit when she told me she is only sleeping a couple hours at night she admits to sleeping 6 or 7 hours a night on the zolpidem at 5 mg and did like to continue it.  She has no complaints and still refuses to see a sleep specialist.  She is still taking her gabapentin.  She is up-to-date with her neurologist.  She is taking her blood pressure medicine and has no other complaints today.  She needs to get back on her nitrofurantoin for her to prevent UTIs.  She did have significant elevation of LFTs last time and never did get in for her liver ultrasound.  She did stop her statin at that time so we need to recheck those levels.      Objective   Vital Signs:  /78   Pulse 76   Temp 97.3 °F (36.3 °C)   Ht 160 cm (63\")   Wt 77.7 kg (171 lb 3.2 oz)   SpO2 98%   BMI 30.33 kg/m²           Physical Exam  Constitutional:       General: She is not in acute distress.     Appearance: Normal appearance. She is well-developed. She is obese.   HENT:      Head: Normocephalic and atraumatic.      Right Ear: Tympanic membrane, ear canal and external ear normal.      Left Ear: Tympanic membrane, ear canal and external ear normal.      Mouth/Throat:      Mouth: Mucous membranes are moist.      Pharynx: Oropharynx is clear.   Eyes:      Conjunctiva/sclera: Conjunctivae normal.      Pupils: Pupils are equal, round, and reactive to light.   Neck:      Thyroid: No thyromegaly.   Cardiovascular:      Rate and Rhythm: Normal rate and regular rhythm.      Heart sounds: No murmur heard.  Pulmonary:      Effort: Pulmonary effort is normal.      Breath sounds: Normal breath sounds. No wheezing.   Abdominal:      General: Bowel sounds are normal.      Palpations: Abdomen is soft.      Tenderness: There is no abdominal " tenderness.   Musculoskeletal:         General: Normal range of motion.      Cervical back: Neck supple.   Lymphadenopathy:      Cervical: No cervical adenopathy.   Skin:     General: Skin is warm and dry.   Neurological:      Mental Status: She is alert and oriented to person, place, and time.   Psychiatric:         Mood and Affect: Mood normal.         Behavior: Behavior normal.        Result Review :                 Assessment and Plan    Diagnoses and all orders for this visit:    1. Chronic insomnia (Primary)  -     zolpidem (AMBIEN) 5 MG tablet; Take 1 tablet by mouth At Night As Needed for Sleep.  Dispense: 30 tablet; Refill: 2    2. Essential hypertension  -     Comprehensive Metabolic Panel    3. Transaminitis  -     Comprehensive Metabolic Panel    4. Chronic UTI  -     nitrofurantoin, macrocrystal-monohydrate, (Macrobid) 100 MG capsule; Take 1 capsule by mouth Daily.  Dispense: 90 capsule; Refill: 1    5. High risk medication use  -     Drug Profile [id number] - Urine, Clean Catch             Follow Up   Return in about 3 months (around 8/5/2022) for Recheck.  Patient was given instructions and counseling regarding her condition or for health maintenance advice. Please see specific information pulled into the AVS if appropriate.

## 2022-05-06 DIAGNOSIS — I26.99 PULMONARY EMBOLISM, UNSPECIFIED CHRONICITY, UNSPECIFIED PULMONARY EMBOLISM TYPE, UNSPECIFIED WHETHER ACUTE COR PULMONALE PRESENT: ICD-10-CM

## 2022-05-06 RX ORDER — RIVAROXABAN 20 MG/1
TABLET, FILM COATED ORAL
Qty: 90 TABLET | Refills: 0 | Status: SHIPPED | OUTPATIENT
Start: 2022-05-06 | End: 2022-08-15

## 2022-05-07 LAB
ALBUMIN SERPL-MCNC: 4.8 G/DL (ref 3.6–4.6)
ALBUMIN/GLOB SERPL: 1.8 {RATIO} (ref 1.2–2.2)
ALP SERPL-CCNC: 176 IU/L (ref 44–121)
ALT SERPL-CCNC: 13 IU/L (ref 0–32)
AMPHETAMINES UR QL SCN: NEGATIVE NG/ML
AST SERPL-CCNC: 19 IU/L (ref 0–40)
BARBITURATES UR QL: NEGATIVE NG/ML
BENZODIAZ UR QL SCN: NEGATIVE NG/ML
BILIRUB SERPL-MCNC: 0.5 MG/DL (ref 0–1.2)
BUN SERPL-MCNC: 16 MG/DL (ref 8–27)
BUN/CREAT SERPL: 21 (ref 12–28)
BZE UR QL: NEGATIVE NG/ML
CALCIUM SERPL-MCNC: 9.9 MG/DL (ref 8.7–10.3)
CANNABINOIDS UR QL SCN: NEGATIVE NG/ML
CHLORIDE SERPL-SCNC: 104 MMOL/L (ref 96–106)
CO2 SERPL-SCNC: 22 MMOL/L (ref 20–29)
CREAT SERPL-MCNC: 0.77 MG/DL (ref 0.57–1)
CREAT UR-MCNC: 153 MG/DL (ref 20–300)
EGFRCR SERPLBLD CKD-EPI 2021: 77 ML/MIN/1.73
ETHANOL UR-MCNC: NEGATIVE %
GLOBULIN SER CALC-MCNC: 2.6 G/DL (ref 1.5–4.5)
GLUCOSE SERPL-MCNC: 100 MG/DL (ref 65–99)
MEPERIDINE UR QL: NEGATIVE NG/ML
METHADONE UR QL: NEGATIVE NG/ML
OPIATES UR QL SCN: NEGATIVE NG/ML
OXYCODONE+OXYMORPHONE UR QL SCN: NEGATIVE NG/ML
PCP UR QL: NEGATIVE NG/ML
POTASSIUM SERPL-SCNC: 4.5 MMOL/L (ref 3.5–5.2)
PROPOXYPH UR QL: NEGATIVE NG/ML
PROT SERPL-MCNC: 7.4 G/DL (ref 6–8.5)
SODIUM SERPL-SCNC: 143 MMOL/L (ref 134–144)
TRAMADOL UR QL SCN: NEGATIVE NG/ML

## 2022-06-14 ENCOUNTER — OFFICE VISIT (OUTPATIENT)
Dept: FAMILY MEDICINE CLINIC | Facility: CLINIC | Age: 82
End: 2022-06-14

## 2022-06-14 VITALS
OXYGEN SATURATION: 98 % | WEIGHT: 170.6 LBS | TEMPERATURE: 97.5 F | DIASTOLIC BLOOD PRESSURE: 86 MMHG | SYSTOLIC BLOOD PRESSURE: 136 MMHG | HEART RATE: 76 BPM | BODY MASS INDEX: 30.23 KG/M2 | HEIGHT: 63 IN

## 2022-06-14 DIAGNOSIS — K80.20 CALCULUS OF GALLBLADDER WITHOUT CHOLECYSTITIS WITHOUT OBSTRUCTION: Primary | ICD-10-CM

## 2022-06-14 DIAGNOSIS — R74.01 TRANSAMINITIS: ICD-10-CM

## 2022-06-14 DIAGNOSIS — D49.2 NEOPLASM OF SKIN OF CHEST: ICD-10-CM

## 2022-06-14 PROCEDURE — 99214 OFFICE O/P EST MOD 30 MIN: CPT | Performed by: FAMILY MEDICINE

## 2022-06-14 NOTE — PROGRESS NOTES
"Chief Complaint  follow up er  (Gallbladder /Gall stones )    Subjective        Annalee Melara presents to [facility]  History of Present Illness  The patient presents today for follow-up from an emergency room visit from 05/31/2022. She is accompanied by her daughter.    ER follow-up  The patient states she was diagnosed with transaminitis and gallstones at the ER. She was not admitted into the hospital. She felt fatigued and had chest tightness that radiated to her back when she went to the ER. She denies any abdominal pain, nausea, diarrhea, dark yellow urine, discoloration to her skin or eyes, fever, or chills. She denies difficulty with eating or drinking.     Skin tag  The patient complains of a skin tag. She states she picks at the skin tag at nighttime.     Review of systems:  A review of systems was completed and positive findings are noted in the HPI.     Objective   Vital Signs:  /86   Pulse 76   Temp 97.5 °F (36.4 °C)   Ht 160 cm (63\")   Wt 77.4 kg (170 lb 9.6 oz)   SpO2 98%   BMI 30.22 kg/m²   Estimated body mass index is 30.22 kg/m² as calculated from the following:    Height as of this encounter: 160 cm (63\").    Weight as of this encounter: 77.4 kg (170 lb 9.6 oz).          Physical Exam  Constitutional:       General: She is not in acute distress.     Appearance: Normal appearance. She is well-developed.   HENT:      Head: Normocephalic and atraumatic.      Right Ear: Tympanic membrane, ear canal and external ear normal.      Left Ear: Tympanic membrane, ear canal and external ear normal.      Mouth/Throat:      Mouth: Mucous membranes are moist.      Pharynx: Oropharynx is clear.   Eyes:      Conjunctiva/sclera: Conjunctivae normal.      Pupils: Pupils are equal, round, and reactive to light.   Neck:      Thyroid: No thyromegaly.   Cardiovascular:      Rate and Rhythm: Normal rate and regular rhythm.      Heart sounds: No murmur heard.  Pulmonary:      " Effort: Pulmonary effort is normal.      Breath sounds: Normal breath sounds. No wheezing.   Abdominal:      General: Bowel sounds are normal.      Palpations: Abdomen is soft.      Tenderness: There is no abdominal tenderness.   Musculoskeletal:         General: Normal range of motion.      Cervical back: Neck supple.   Lymphadenopathy:      Cervical: No cervical adenopathy.   Skin:     General: Skin is warm and dry.      Findings: Lesion present.      Comments: Right anterior chest with a 3 mm verrucous appearing lesion.   Neurological:      Mental Status: She is alert and oriented to person, place, and time.   Psychiatric:         Mood and Affect: Mood normal.         Behavior: Behavior normal.        Result Review :           EMERGENCY DEPART/[facility] - SCAN - [facility] [place] [facility] (05/31/2022)  LABORATORY - SCAN - [facility] LAB (05/31/2022)  RADIOLOGY - SCAN - US GALLBLADDER (05/31/2022)  ECHOCARDIOGRAM - SCAN - EKG (05/31/2022)  COMPREHENSIVE METABOLIC PANEL (05/31/2022 13:43)  CBC AND DIFFERENTIAL (05/31/2022 13:43)  MANUAL DIFFERENTIAL (05/31/2022 13:43)  LIPASE (05/31/2022 14:45)  TROPONIN I, HIGH SENSITIVE (05/31/2022 13:43)  RADIOLOGY - SCAN - US GALLBLADDER (05/31/2022)       Assessment and Plan   Diagnoses and all orders for this visit:    1. Calculus of gallbladder without cholecystitis without obstruction (Primary)  -     Ambulatory Referral to General Surgery    2. Transaminitis  -     Ambulatory Referral to General Surgery      1. Calculus of gallbladder without cholecystitis without obstruction  - She is agreeable to have her gallbladder removed. A general surgery referral has been placed.   - She is to present to the ER if she experiences severe pain, nausea, vomiting, jaundice, fever, or discolored urine.     2. Transaminitis   - A general surgery referral has been placed.        Follow Up   No follow-ups on file. Return to care for a shave biopsy procedure.     Patient was given instructions and counseling  regarding her condition or for health maintenance advice. Please see specific information pulled into the AVS if appropriate.       Transcribed from ambient dictation for Meredith Lea Kehrer, MD by NILAY TOLENTINO.  06/14/22   12:25 EDT    Patient verbalized consent to the visit recording.

## 2022-06-15 LAB
ALBUMIN SERPL-MCNC: 4.2 G/DL (ref 3.6–4.6)
ALBUMIN/GLOB SERPL: 1.6 {RATIO} (ref 1.2–2.2)
ALP SERPL-CCNC: 234 IU/L (ref 44–121)
ALT SERPL-CCNC: 24 IU/L (ref 0–32)
AMYLASE SERPL-CCNC: 74 U/L (ref 31–110)
AST SERPL-CCNC: 21 IU/L (ref 0–40)
BASOPHILS # BLD AUTO: 0.1 X10E3/UL (ref 0–0.2)
BASOPHILS NFR BLD AUTO: 1 %
BILIRUB SERPL-MCNC: 0.5 MG/DL (ref 0–1.2)
BUN SERPL-MCNC: 16 MG/DL (ref 8–27)
BUN/CREAT SERPL: 19 (ref 12–28)
CALCIUM SERPL-MCNC: 9.9 MG/DL (ref 8.7–10.3)
CHLORIDE SERPL-SCNC: 102 MMOL/L (ref 96–106)
CO2 SERPL-SCNC: 23 MMOL/L (ref 20–29)
CREAT SERPL-MCNC: 0.84 MG/DL (ref 0.57–1)
EGFRCR SERPLBLD CKD-EPI 2021: 70 ML/MIN/1.73
EOSINOPHIL # BLD AUTO: 0.1 X10E3/UL (ref 0–0.4)
EOSINOPHIL NFR BLD AUTO: 2 %
ERYTHROCYTE [DISTWIDTH] IN BLOOD BY AUTOMATED COUNT: 12.2 % (ref 11.7–15.4)
GLOBULIN SER CALC-MCNC: 2.7 G/DL (ref 1.5–4.5)
GLUCOSE SERPL-MCNC: 109 MG/DL (ref 65–99)
HCT VFR BLD AUTO: 46.2 % (ref 34–46.6)
HGB BLD-MCNC: 14.8 G/DL (ref 11.1–15.9)
IMM GRANULOCYTES # BLD AUTO: 0 X10E3/UL (ref 0–0.1)
IMM GRANULOCYTES NFR BLD AUTO: 0 %
LYMPHOCYTES # BLD AUTO: 1.9 X10E3/UL (ref 0.7–3.1)
LYMPHOCYTES NFR BLD AUTO: 25 %
MCH RBC QN AUTO: 28.8 PG (ref 26.6–33)
MCHC RBC AUTO-ENTMCNC: 32 G/DL (ref 31.5–35.7)
MCV RBC AUTO: 90 FL (ref 79–97)
MONOCYTES # BLD AUTO: 0.6 X10E3/UL (ref 0.1–0.9)
MONOCYTES NFR BLD AUTO: 8 %
NEUTROPHILS # BLD AUTO: 5 X10E3/UL (ref 1.4–7)
NEUTROPHILS NFR BLD AUTO: 64 %
PLATELET # BLD AUTO: 371 X10E3/UL (ref 150–450)
POTASSIUM SERPL-SCNC: 4.9 MMOL/L (ref 3.5–5.2)
PROT SERPL-MCNC: 6.9 G/DL (ref 6–8.5)
RBC # BLD AUTO: 5.14 X10E6/UL (ref 3.77–5.28)
SODIUM SERPL-SCNC: 140 MMOL/L (ref 134–144)
WBC # BLD AUTO: 7.8 X10E3/UL (ref 3.4–10.8)

## 2022-06-18 DIAGNOSIS — F51.04 CHRONIC INSOMNIA: ICD-10-CM

## 2022-06-18 DIAGNOSIS — F32.A DEPRESSION, UNSPECIFIED DEPRESSION TYPE: ICD-10-CM

## 2022-06-20 RX ORDER — ZOLPIDEM TARTRATE 5 MG/1
5 TABLET ORAL NIGHTLY PRN
Qty: 30 TABLET | Refills: 0 | OUTPATIENT
Start: 2022-06-20

## 2022-06-20 RX ORDER — DULOXETIN HYDROCHLORIDE 60 MG/1
CAPSULE, DELAYED RELEASE ORAL
Qty: 90 CAPSULE | Refills: 0 | Status: SHIPPED | OUTPATIENT
Start: 2022-06-20 | End: 2022-09-20

## 2022-06-27 ENCOUNTER — PROCEDURE VISIT (OUTPATIENT)
Dept: FAMILY MEDICINE CLINIC | Facility: CLINIC | Age: 82
End: 2022-06-27

## 2022-06-27 VITALS
WEIGHT: 171.8 LBS | BODY MASS INDEX: 30.44 KG/M2 | HEART RATE: 77 BPM | HEIGHT: 63 IN | OXYGEN SATURATION: 96 % | SYSTOLIC BLOOD PRESSURE: 124 MMHG | TEMPERATURE: 97.3 F | DIASTOLIC BLOOD PRESSURE: 68 MMHG

## 2022-06-27 DIAGNOSIS — D49.2 NEOPLASM OF SKIN OF CHEST: Primary | ICD-10-CM

## 2022-06-27 PROBLEM — R10.13 EPIGASTRIC DISCOMFORT: Status: ACTIVE | Noted: 2022-06-23

## 2022-06-27 PROBLEM — H35.3122 INTERMEDIATE STAGE NONEXUDATIVE AGE-RELATED MACULAR DEGENERATION OF LEFT EYE: Status: ACTIVE | Noted: 2019-04-08

## 2022-06-27 PROCEDURE — 11301 SHAVE SKIN LESION 0.6-1.0 CM: CPT | Performed by: FAMILY MEDICINE

## 2022-06-27 NOTE — PROGRESS NOTES
Procedure   Biopsy    Date/Time: 6/27/2022 11:53 AM  Performed by: Kehrer, Meredith Lea, MD  Authorized by: Kehrer, Meredith Lea, MD     Procedure Details - Skin Biopsy:     Body area: trunk    Trunk location: chest    Initial size (mm): 4    Final defect size (mm): 6    Malignancy: malignancy unknown      Destruction method: shave biopsy      Comments:   Patient with growing lesion in the center of her chest that has been irritating her.  This lesion was 4 mm and erythematous with a rolled border with some scale on top.  After consent, cleansing with Hibiclens x3, and anesthetizing with 1 mL 1% lidocaine with epinephrine, the lesion was removed with a 15 blade in a shave manner.  Hemostasis with silver nitrate sticks.  Dressing with bacitracin and a bandage.  Patient tolerated well.  Specimen sent for pathology.

## 2022-07-03 LAB
DX ICD CODE: NORMAL
DX ICD CODE: NORMAL
ONE SPECIMEN IDENTIFIER: NORMAL
PATH REPORT.FINAL DX SPEC: NORMAL
PATH REPORT.GROSS SPEC: NORMAL
PATH REPORT.SITE OF ORIGIN SPEC: NORMAL
PATHOLOGIST NAME: NORMAL
PAYMENT PROCEDURE: NORMAL

## 2022-07-11 ENCOUNTER — TELEPHONE (OUTPATIENT)
Dept: FAMILY MEDICINE CLINIC | Facility: CLINIC | Age: 82
End: 2022-07-11

## 2022-07-11 ENCOUNTER — OFFICE VISIT (OUTPATIENT)
Dept: FAMILY MEDICINE CLINIC | Facility: CLINIC | Age: 82
End: 2022-07-11

## 2022-07-11 VITALS
HEIGHT: 63 IN | SYSTOLIC BLOOD PRESSURE: 128 MMHG | OXYGEN SATURATION: 94 % | TEMPERATURE: 98.4 F | WEIGHT: 176.8 LBS | DIASTOLIC BLOOD PRESSURE: 70 MMHG | HEART RATE: 83 BPM | BODY MASS INDEX: 31.33 KG/M2

## 2022-07-11 DIAGNOSIS — U07.1 COVID-19 VIRUS DETECTED: Primary | ICD-10-CM

## 2022-07-11 DIAGNOSIS — Z79.01 LONG TERM (CURRENT) USE OF ANTICOAGULANTS: ICD-10-CM

## 2022-07-11 PROCEDURE — 99213 OFFICE O/P EST LOW 20 MIN: CPT | Performed by: FAMILY MEDICINE

## 2022-07-11 NOTE — PROGRESS NOTES
"Chief Complaint  covid positive  (Test POS today )    Subjective        Annalee Melara presents to [facility]  History of Present Illness  The patient presents today to discuss her COVID-19 test results. She is accompanied by her .    COVID-19 test results  The patient tested positive for COVID-19 on 07/06/2022 and again today. The patient denies feeling ill at all. She states she has not been testing every day. She is fully vaccinated against COVID-19. The patient states that she would like to take Paxlovid. The patient admits to having an occasional cough, and states \"I keep a cough.\"  Her  did test positive about 4 5 days ago.  Upon further questioning it seems like she has had some mild cold symptoms.    Review of Systems:  A review of systems was performed, and pertinent findings are noted in the HPI.     Objective   Vital Signs:  /70   Pulse 83   Temp 98.4 °F (36.9 °C)   Ht 160 cm (63\")   Wt 80.2 kg (176 lb 12.8 oz)   SpO2 94%   BMI 31.32 kg/m²   Estimated body mass index is 31.32 kg/m² as calculated from the following:    Height as of this encounter: 160 cm (63\").    Weight as of this encounter: 80.2 kg (176 lb 12.8 oz).    BMI is >= 30 and <35. (Class 1 Obesity). The following options were offered after discussion;: covid home test positive      Physical Exam  Constitutional:       General: She is not in acute distress.     Appearance: Normal appearance. She is well-developed.   HENT:      Head: Normocephalic and atraumatic.      Right Ear: Tympanic membrane, ear canal and external ear normal.      Left Ear: Tympanic membrane, ear canal and external ear normal.      Mouth/Throat:      Mouth: Mucous membranes are moist.      Pharynx: Oropharynx is clear.   Eyes:      Conjunctiva/sclera: Conjunctivae normal.      Pupils: Pupils are equal, round, and reactive to light.   Neck:      Thyroid: No thyromegaly.   Cardiovascular:      Rate and Rhythm: " Normal rate and regular rhythm.      Heart sounds: No murmur heard.  Pulmonary:      Effort: Pulmonary effort is normal.      Breath sounds: Normal breath sounds. No wheezing.   Abdominal:      General: Bowel sounds are normal.      Palpations: Abdomen is soft.      Tenderness: There is no abdominal tenderness.   Musculoskeletal:         General: Normal range of motion.      Cervical back: Neck supple.   Lymphadenopathy:      Cervical: No cervical adenopathy.   Skin:     General: Skin is warm and dry.   Neurological:      Mental Status: She is alert and oriented to person, place, and time.   Psychiatric:         Mood and Affect: Mood normal.         Behavior: Behavior normal.        Result Review :                Assessment and Plan   Diagnoses and all orders for this visit:    1. COVID-19 virus detected (Primary)  -     Nirmatrelvir & Ritonavir (PAXLOVID) 20 x 150 MG & 10 x 100MG tablet therapy pack tablet; Take 3 tablets by mouth 2 (Two) Times a Day for 5 days.  Dispense: 30 tablet; Refill: 0    2. Long term (current) use of anticoagulants    1. COVID-19  - The patient will start isolation beginning now and then in 5 days she can be around others with a mask on. She was advised to stop her Xarelto while taking Paxlovid. She was given sample of Eliquis, and was advised to take 2.5 mg twice daily to take for 5 days.          Follow Up   No follow-ups on file.  Patient was given instructions and counseling regarding her condition or for health maintenance advice. Please see specific information pulled into the AVS if appropriate.     Transcribed from ambient dictation for Meredith Lea Kehrer, MD by Kendal Restrepo.  07/11/22   16:34 EDT    Patient verbalized consent to the visit recording.  I have personally performed the services described in this document as transcribed by the above individual, and it is both accurate and complete.  Meredith Lea Kehrer, MD  7/11/2022  16:48 EDT

## 2022-07-11 NOTE — TELEPHONE ENCOUNTER
Caller: Annalee Melara    Relationship: Self    Best call back number: [phone]    What medication are you requesting: SOMETHING FOR COVID    What are your current symptoms:NONE      If a prescription is needed, what is your preferred pharmacy and phone number: [facility] [address] [place] [address] - [phone]  - [phone]     Additional notes:  HAD IT LAST WEEK

## 2022-07-19 ENCOUNTER — TELEPHONE (OUTPATIENT)
Dept: FAMILY MEDICINE CLINIC | Facility: CLINIC | Age: 82
End: 2022-07-19

## 2022-07-22 NOTE — TELEPHONE ENCOUNTER
SPOKE WITH PATIENT WHEN I WAS CALLING REGARDING HUSBANDS LABS.  SHE IS STATING THAT SHE IS IN A LOT OF PAIN.  SHE STATING IT BURNS AND SHE IS STATING THAT IT IS VERY PAINFUL. PT IS STATING THAT SHE TOOK ALL OF THE KEFLEX AND IT IS NOT WORKING.  SHE IS STATING THAT ONLY [facility] COVERS HER UTIS.  PT USES [facility].  HER CULTURE FROM 03/02/22 STILL IS NOT BACK, CHECKED WITH [facility] AND IT IS ONLY A PRELIM WITH GRAM NEGATIVE RODS.    Detail Level: Simple Procedure To Be Performed At Next Visit: Biopsy by shave method Introduction Text (Please End With A Colon): Deferred:

## 2022-07-31 DIAGNOSIS — F51.04 CHRONIC INSOMNIA: ICD-10-CM

## 2022-08-01 RX ORDER — ZOLPIDEM TARTRATE 5 MG/1
5 TABLET ORAL NIGHTLY PRN
Qty: 30 TABLET | Refills: 0 | Status: SHIPPED | OUTPATIENT
Start: 2022-08-01 | End: 2022-09-02

## 2022-08-15 DIAGNOSIS — I26.99 PULMONARY EMBOLISM, UNSPECIFIED CHRONICITY, UNSPECIFIED PULMONARY EMBOLISM TYPE, UNSPECIFIED WHETHER ACUTE COR PULMONALE PRESENT: ICD-10-CM

## 2022-08-15 RX ORDER — RIVAROXABAN 20 MG/1
TABLET, FILM COATED ORAL
Qty: 90 TABLET | Refills: 0 | Status: SHIPPED | OUTPATIENT
Start: 2022-08-15 | End: 2022-11-01

## 2022-08-15 RX ORDER — LISINOPRIL 10 MG/1
TABLET ORAL
Qty: 90 TABLET | Refills: 0 | Status: SHIPPED | OUTPATIENT
Start: 2022-08-15 | End: 2022-11-10 | Stop reason: SDUPTHER

## 2022-08-28 NOTE — TELEPHONE ENCOUNTER
Hub staff attempted to follow warm transfer process and was unsuccessful     Caller: Annalee Melara    Relationship to patient: Self    Best call back number:[phone]    Patient is needing: PATIENT CALLED IN REQUESTING TO KNOW THE STATUS OF THE WOUND CARE. SHE IS CONCERNED ABOUT THE BLISTERS. ATTEMPTED TO WARM TRANSFER AND WAS PLACED ON HOLD FOR 2+ MINUTES. PLEASE CALL PATIENT AND ADVISE.         show

## 2022-09-02 DIAGNOSIS — F51.04 CHRONIC INSOMNIA: ICD-10-CM

## 2022-09-02 RX ORDER — ZOLPIDEM TARTRATE 5 MG/1
5 TABLET ORAL NIGHTLY PRN
Qty: 30 TABLET | Refills: 0 | Status: SHIPPED | OUTPATIENT
Start: 2022-09-02 | End: 2022-10-03

## 2022-09-15 ENCOUNTER — TELEPHONE (OUTPATIENT)
Dept: FAMILY MEDICINE CLINIC | Facility: CLINIC | Age: 82
End: 2022-09-15

## 2022-09-15 RX ORDER — OMEPRAZOLE 40 MG/1
40 CAPSULE, DELAYED RELEASE ORAL EVERY MORNING
Qty: 90 CAPSULE | Refills: 3 | Status: SHIPPED | OUTPATIENT
Start: 2022-09-15

## 2022-09-15 NOTE — TELEPHONE ENCOUNTER
Caller: Annalee Melara    Relationship: Self    Best call back number: [phone]    What is the best time to reach you: ANYTIME    Who are you requesting to speak with (clinical staff, provider,  specific staff member): CLINICAL STAFF  Do you know the name of the person who called: TRACI  What was the call regarding: PATIENT CALLED AND STATED SHE WAS OUT OF REFILLS FOR OMEPRAZOLE. PLEASE REFILL MEDICATION.  Do you require a callback: YES

## 2022-09-20 DIAGNOSIS — F32.A DEPRESSION, UNSPECIFIED DEPRESSION TYPE: ICD-10-CM

## 2022-09-20 RX ORDER — DULOXETIN HYDROCHLORIDE 60 MG/1
CAPSULE, DELAYED RELEASE ORAL
Qty: 90 CAPSULE | Refills: 0 | Status: SHIPPED | OUTPATIENT
Start: 2022-09-20 | End: 2022-12-15

## 2022-10-03 DIAGNOSIS — F51.04 CHRONIC INSOMNIA: ICD-10-CM

## 2022-10-03 RX ORDER — ZOLPIDEM TARTRATE 5 MG/1
5 TABLET ORAL NIGHTLY PRN
Qty: 30 TABLET | Refills: 0 | Status: SHIPPED | OUTPATIENT
Start: 2022-10-03 | End: 2022-11-01

## 2022-10-04 ENCOUNTER — FLU SHOT (OUTPATIENT)
Dept: FAMILY MEDICINE CLINIC | Facility: CLINIC | Age: 82
End: 2022-10-04

## 2022-10-04 PROCEDURE — 90662 IIV NO PRSV INCREASED AG IM: CPT | Performed by: FAMILY MEDICINE

## 2022-10-04 PROCEDURE — G0008 ADMIN INFLUENZA VIRUS VAC: HCPCS | Performed by: FAMILY MEDICINE

## 2022-10-13 DIAGNOSIS — N39.0 CHRONIC UTI: ICD-10-CM

## 2022-10-13 RX ORDER — NITROFURANTOIN 25; 75 MG/1; MG/1
CAPSULE ORAL
Qty: 90 CAPSULE | Refills: 0 | Status: SHIPPED | OUTPATIENT
Start: 2022-10-13 | End: 2023-01-09

## 2022-11-01 DIAGNOSIS — I26.99 PULMONARY EMBOLISM, UNSPECIFIED CHRONICITY, UNSPECIFIED PULMONARY EMBOLISM TYPE, UNSPECIFIED WHETHER ACUTE COR PULMONALE PRESENT: ICD-10-CM

## 2022-11-01 DIAGNOSIS — F51.04 CHRONIC INSOMNIA: ICD-10-CM

## 2022-11-01 RX ORDER — ZOLPIDEM TARTRATE 5 MG/1
5 TABLET ORAL NIGHTLY PRN
Qty: 30 TABLET | Refills: 0 | Status: SHIPPED | OUTPATIENT
Start: 2022-11-01 | End: 2022-11-10 | Stop reason: SDUPTHER

## 2022-11-01 RX ORDER — RIVAROXABAN 20 MG/1
TABLET, FILM COATED ORAL
Qty: 90 TABLET | Refills: 0 | Status: SHIPPED | OUTPATIENT
Start: 2022-11-01 | End: 2023-01-09

## 2022-11-10 ENCOUNTER — OFFICE VISIT (OUTPATIENT)
Dept: FAMILY MEDICINE CLINIC | Facility: CLINIC | Age: 82
End: 2022-11-10

## 2022-11-10 VITALS
TEMPERATURE: 98.7 F | DIASTOLIC BLOOD PRESSURE: 80 MMHG | BODY MASS INDEX: 31.01 KG/M2 | HEART RATE: 72 BPM | WEIGHT: 175 LBS | OXYGEN SATURATION: 98 % | HEIGHT: 63 IN | SYSTOLIC BLOOD PRESSURE: 136 MMHG

## 2022-11-10 DIAGNOSIS — I26.99 PULMONARY EMBOLISM, UNSPECIFIED CHRONICITY, UNSPECIFIED PULMONARY EMBOLISM TYPE, UNSPECIFIED WHETHER ACUTE COR PULMONALE PRESENT: ICD-10-CM

## 2022-11-10 DIAGNOSIS — Z79.899 HIGH RISK MEDICATION USE: ICD-10-CM

## 2022-11-10 DIAGNOSIS — N39.0 CHRONIC UTI: ICD-10-CM

## 2022-11-10 DIAGNOSIS — F51.04 CHRONIC INSOMNIA: Primary | ICD-10-CM

## 2022-11-10 DIAGNOSIS — F32.A DEPRESSION, UNSPECIFIED DEPRESSION TYPE: ICD-10-CM

## 2022-11-10 DIAGNOSIS — Z79.01 LONG TERM (CURRENT) USE OF ANTICOAGULANTS: ICD-10-CM

## 2022-11-10 DIAGNOSIS — I10 ESSENTIAL HYPERTENSION: ICD-10-CM

## 2022-11-10 PROBLEM — Z96.1 PSEUDOPHAKIA OF BOTH EYES: Status: ACTIVE | Noted: 2021-07-22

## 2022-11-10 LAB
BILIRUB BLD-MCNC: NEGATIVE MG/DL
CLARITY, POC: CLEAR
COLOR UR: YELLOW
EXPIRATION DATE: ABNORMAL
GLUCOSE UR STRIP-MCNC: NEGATIVE MG/DL
KETONES UR QL: NEGATIVE
LEUKOCYTE EST, POC: NEGATIVE
Lab: ABNORMAL
NITRITE UR-MCNC: NEGATIVE MG/ML
PH UR: 6 [PH] (ref 5–8)
PROT UR STRIP-MCNC: ABNORMAL MG/DL
RBC # UR STRIP: NEGATIVE /UL
SP GR UR: 1.02 (ref 1–1.03)
UROBILINOGEN UR QL: NORMAL

## 2022-11-10 PROCEDURE — 99214 OFFICE O/P EST MOD 30 MIN: CPT | Performed by: FAMILY MEDICINE

## 2022-11-10 PROCEDURE — 81003 URINALYSIS AUTO W/O SCOPE: CPT | Performed by: FAMILY MEDICINE

## 2022-11-10 RX ORDER — LISINOPRIL 10 MG/1
10 TABLET ORAL DAILY
Qty: 90 TABLET | Refills: 3 | Status: SHIPPED | OUTPATIENT
Start: 2022-11-10

## 2022-11-10 RX ORDER — ZOLPIDEM TARTRATE 5 MG/1
5 TABLET ORAL NIGHTLY PRN
Qty: 30 TABLET | Refills: 2 | Status: SHIPPED | OUTPATIENT
Start: 2022-11-10 | End: 2023-02-20 | Stop reason: SDUPTHER

## 2022-11-10 NOTE — PROGRESS NOTES
"Chief Complaint  Med Refill, Hypertension, Hyperlipidemia, Depression, and Insomnia    Subjective        Annalee Melara presents to [facility]  History of Present Illness  Annalee Melara is an 82-year-old female who presents today for a 3-month follow-up on her multiple medical problems. She is accompanied by an adult male.    The patient states she is doing well overall. She denies any recent fractures, DVTs, or UTI symptoms. She continues to take Ambien for sleep, and reports that she gets approximately 5 hours of sleep per night.  She does not get any sleep without the Ambien.  Patient is very resistant to try to switch to anything else.  We have had long conversations with the [facility] about the risk involved at her age.  The patient has discontinued gabapentin for her neuropathy as she was previously instructed, but feels that her pain is okay. The patient states her depression is doing well on Cymbalta. She denies any bleeding on her blood thinner. The patient denies any chest pain or shortness of breath. She states she drinks plenty of water.  She is compliant with her blood pressure and other medications denies any chest pain or shortness of breath.    She has not scheduled her Medicare Wellness Check yet.    Objective   Vital Signs:  /80   Pulse 72   Temp 98.7 °F (37.1 °C)   Ht 160 cm (63\")   Wt 79.4 kg (175 lb)   SpO2 98%   BMI 31.00 kg/m²   Estimated body mass index is 31 kg/m² as calculated from the following:    Height as of this encounter: 160 cm (63\").    Weight as of this encounter: 79.4 kg (175 lb).          Physical Exam  Constitutional:       General: She is not in acute distress.     Appearance: Normal appearance. She is well-developed.   HENT:      Head: Normocephalic and atraumatic.      Right Ear: Tympanic membrane, ear canal and external ear normal.      Left Ear: Tympanic membrane, ear canal and external ear normal.      Mouth/Throat:      " Mouth: Mucous membranes are moist.      Pharynx: Oropharynx is clear.   Eyes:      Conjunctiva/sclera: Conjunctivae normal.      Pupils: Pupils are equal, round, and reactive to light.   Neck:      Thyroid: No thyromegaly.   Cardiovascular:      Rate and Rhythm: Normal rate and regular rhythm.      Heart sounds: No murmur heard.  Pulmonary:      Effort: Pulmonary effort is normal.      Breath sounds: Normal breath sounds. No wheezing.   Abdominal:      General: Bowel sounds are normal.      Palpations: Abdomen is soft.      Tenderness: There is no abdominal tenderness.   Musculoskeletal:         General: Normal range of motion.      Cervical back: Neck supple.   Lymphadenopathy:      Cervical: No cervical adenopathy.   Skin:     General: Skin is warm.      Comments: Skin is significantly dry.    Neurological:      Mental Status: She is alert and oriented to person, place, and time.   Psychiatric:         Mood and Affect: Mood normal.         Behavior: Behavior normal.        Result Review :                Assessment and Plan   Diagnoses and all orders for this visit:    1. Chronic insomnia (Primary)  -     zolpidem (AMBIEN) 5 MG tablet; Take 1 tablet by mouth At Night As Needed for Sleep.  Dispense: 30 tablet; Refill: 2    2. Pulmonary embolism, unspecified chronicity, unspecified pulmonary embolism type, unspecified whether acute cor pulmonale present (HCC)    3. Chronic UTI  -     POC Urinalysis Dipstick, Automated    4. Depression, unspecified depression type    5. Long term (current) use of anticoagulants    6. Essential hypertension  -     Basic Metabolic Panel  -     lisinopril (PRINIVIL,ZESTRIL) 10 MG tablet; Take 1 tablet by mouth Daily.  Dispense: 90 tablet; Refill: 3    7. High risk medication use  -     Drug Profile [id number] - Urine, Clean Catch      Patient is getting 5 hours of sleep per night and was advised to continue with Ambien, which was refilled. Depression is well controlled and she will continue  with Cymbalta as prescribed, which was refilled. Blood pressure is within normal limits today and weight is stable. Lisinopril was refilled and patient will continue with this as prescribed. Labs to assess kidney function were ordered. Patient was instructed to use lotion daily for dry skin to avoid potential infection, sepsis, or cellulitis.         Follow Up   Return in about 3 months (around 2/10/2023) for Medicare Wellness.  Patient was given instructions and counseling regarding her condition or for health maintenance advice. Please see specific information pulled into the AVS if appropriate.     Transcribed from ambient dictation for Meredith Lea Kehrer, MD by Sue Brasher [employer] .  11/10/22   16:22 EST    Patient or patient representative verbalized consent to the visit recording.

## 2022-11-12 LAB
AMPHETAMINES UR QL SCN: NEGATIVE NG/ML
BARBITURATES UR QL: NEGATIVE NG/ML
BENZODIAZ UR QL SCN: NEGATIVE NG/ML
BUN SERPL-MCNC: 16 MG/DL (ref 8–27)
BUN/CREAT SERPL: 21 (ref 12–28)
BZE UR QL: NEGATIVE NG/ML
CALCIUM SERPL-MCNC: 9.6 MG/DL (ref 8.7–10.3)
CANNABINOIDS UR QL SCN: NEGATIVE NG/ML
CHLORIDE SERPL-SCNC: 103 MMOL/L (ref 96–106)
CO2 SERPL-SCNC: 25 MMOL/L (ref 20–29)
CREAT SERPL-MCNC: 0.75 MG/DL (ref 0.57–1)
CREAT UR-MCNC: 82 MG/DL (ref 20–300)
EGFRCR SERPLBLD CKD-EPI 2021: 79 ML/MIN/1.73
ETHANOL UR-MCNC: NEGATIVE %
GLUCOSE SERPL-MCNC: 99 MG/DL (ref 70–99)
MEPERIDINE UR QL: NEGATIVE NG/ML
METHADONE UR QL: NEGATIVE NG/ML
OPIATES UR QL SCN: NEGATIVE NG/ML
OXYCODONE+OXYMORPHONE UR QL SCN: NEGATIVE NG/ML
PCP UR QL: NEGATIVE NG/ML
POTASSIUM SERPL-SCNC: 4.6 MMOL/L (ref 3.5–5.2)
PROPOXYPH UR QL: NEGATIVE NG/ML
SODIUM SERPL-SCNC: 142 MMOL/L (ref 134–144)
TRAMADOL UR QL SCN: NEGATIVE NG/ML

## 2022-12-15 DIAGNOSIS — F32.A DEPRESSION, UNSPECIFIED DEPRESSION TYPE: ICD-10-CM

## 2022-12-15 RX ORDER — DULOXETIN HYDROCHLORIDE 60 MG/1
CAPSULE, DELAYED RELEASE ORAL
Qty: 90 CAPSULE | Refills: 0 | Status: SHIPPED | OUTPATIENT
Start: 2022-12-15 | End: 2023-03-16

## 2023-01-09 DIAGNOSIS — N39.0 CHRONIC UTI: ICD-10-CM

## 2023-01-09 DIAGNOSIS — I26.99 PULMONARY EMBOLISM, UNSPECIFIED CHRONICITY, UNSPECIFIED PULMONARY EMBOLISM TYPE, UNSPECIFIED WHETHER ACUTE COR PULMONALE PRESENT: ICD-10-CM

## 2023-01-09 RX ORDER — RIVAROXABAN 20 MG/1
TABLET, FILM COATED ORAL
Qty: 90 TABLET | Refills: 0 | Status: SHIPPED | OUTPATIENT
Start: 2023-01-09 | End: 2023-04-07

## 2023-01-09 RX ORDER — NITROFURANTOIN 25; 75 MG/1; MG/1
CAPSULE ORAL
Qty: 90 CAPSULE | Refills: 0 | Status: SHIPPED | OUTPATIENT
Start: 2023-01-09

## 2023-02-20 ENCOUNTER — OFFICE VISIT (OUTPATIENT)
Dept: FAMILY MEDICINE CLINIC | Facility: CLINIC | Age: 83
End: 2023-02-20
Payer: MEDICARE

## 2023-02-20 VITALS
OXYGEN SATURATION: 98 % | SYSTOLIC BLOOD PRESSURE: 128 MMHG | DIASTOLIC BLOOD PRESSURE: 72 MMHG | BODY MASS INDEX: 31.54 KG/M2 | HEIGHT: 63 IN | TEMPERATURE: 97.3 F | WEIGHT: 178 LBS | HEART RATE: 75 BPM

## 2023-02-20 DIAGNOSIS — F51.04 CHRONIC INSOMNIA: ICD-10-CM

## 2023-02-20 DIAGNOSIS — I10 ESSENTIAL HYPERTENSION: ICD-10-CM

## 2023-02-20 DIAGNOSIS — Z91.81 AT HIGH RISK FOR FALLS: ICD-10-CM

## 2023-02-20 DIAGNOSIS — D68.62 LUPUS ANTICOAGULANT SYNDROME: ICD-10-CM

## 2023-02-20 DIAGNOSIS — F32.A DEPRESSION, UNSPECIFIED DEPRESSION TYPE: ICD-10-CM

## 2023-02-20 DIAGNOSIS — N39.0 CHRONIC UTI: ICD-10-CM

## 2023-02-20 DIAGNOSIS — Z79.899 HIGH RISK MEDICATION USE: ICD-10-CM

## 2023-02-20 DIAGNOSIS — Z00.00 MEDICARE ANNUAL WELLNESS VISIT, SUBSEQUENT: Primary | ICD-10-CM

## 2023-02-20 DIAGNOSIS — Z79.899 CURRENT USE OF PROTON PUMP INHIBITOR: ICD-10-CM

## 2023-02-20 PROBLEM — H18.603 KERATOCONUS OF BOTH EYES: Status: ACTIVE | Noted: 2023-02-08

## 2023-02-20 PROBLEM — G63 NEUROPATHY DUE TO PERIPHERAL VASCULAR DISEASE: Status: RESOLVED | Noted: 2019-03-20 | Resolved: 2023-02-20

## 2023-02-20 PROBLEM — F32.0 CURRENT MILD EPISODE OF MAJOR DEPRESSIVE DISORDER: Status: RESOLVED | Noted: 2019-03-20 | Resolved: 2023-02-20

## 2023-02-20 PROBLEM — I73.9 NEUROPATHY DUE TO PERIPHERAL VASCULAR DISEASE (HCC): Status: RESOLVED | Noted: 2019-03-20 | Resolved: 2023-02-20

## 2023-02-20 PROCEDURE — 99214 OFFICE O/P EST MOD 30 MIN: CPT | Performed by: FAMILY MEDICINE

## 2023-02-20 PROCEDURE — 1170F FXNL STATUS ASSESSED: CPT | Performed by: FAMILY MEDICINE

## 2023-02-20 PROCEDURE — 1159F MED LIST DOCD IN RCRD: CPT | Performed by: FAMILY MEDICINE

## 2023-02-20 PROCEDURE — G0439 PPPS, SUBSEQ VISIT: HCPCS | Performed by: FAMILY MEDICINE

## 2023-02-20 PROCEDURE — 96160 PT-FOCUSED HLTH RISK ASSMT: CPT | Performed by: FAMILY MEDICINE

## 2023-02-20 RX ORDER — ZOLPIDEM TARTRATE 5 MG/1
5 TABLET ORAL NIGHTLY PRN
Qty: 30 TABLET | Refills: 2 | Status: SHIPPED | OUTPATIENT
Start: 2023-02-20

## 2023-02-20 NOTE — PATIENT INSTRUCTIONS
Fall Prevention in the Home, Adult  Falls can cause injuries and affect people of all ages. There are many simple things that you can do to make your home safe and to help prevent falls. Ask for help when making these changes, if needed.  What actions can I take to prevent falls?  General instructions  Use good lighting in all rooms. Replace any light bulbs that burn out, turn on lights if it is dark, and use night-lights.  Place frequently used items in easy-to-reach places. Lower the shelves around your home if necessary.  Set up furniture so that there are clear paths around it. Avoid moving your furniture around.  Remove throw rugs and other tripping hazards from the floor.  Avoid walking on wet floors.  Fix any uneven floor surfaces.  Add color or contrast paint or tape to grab bars and handrails in your home. Place contrasting color strips on the first and last steps of staircases.  When you use a stepladder, make sure that it is completely opened and that the sides and supports are firmly locked. Have someone hold the ladder while you are using it. Do not climb a closed stepladder.  Know where your pets are when moving through your home.  What can I do in the bathroom?     Keep the floor dry. Immediately clean up any water that is on the floor.  Remove soap buildup in the tub or shower regularly.  Use nonskid mats or decals on the floor of the tub or shower.  Attach bath mats securely with double-sided, nonslip rug tape.  If you need to sit down while you are in the shower, use a plastic, nonslip stool.  Install grab bars by the toilet and in the tub and shower. Do not use towel bars as grab bars.  What can I do in the bedroom?  Make sure that a bedside light is easy to reach.  Do not use oversized bedding that reaches the floor.  Have a firm chair that has side arms to use for getting dressed.  What can I do in the kitchen?  Clean up any spills right away.  If you need to reach for something above you,  use a sturdy step stool that has a grab bar.  Keep electrical cables out of the way.  Do not use floor polish or wax that makes floors slippery. If you must use wax, make sure that it is non-skid floor wax.  What can I do with my stairs?  Do not leave any items on the stairs.  Make sure that you have a light switch at the top and the bottom of the stairs. Have them installed if you do not have them.  Make sure that there are handrails on both sides of the stairs. Fix handrails that are broken or loose. Make sure that handrails are as long as the staircases.  Install non-slip stair treads on all stairs in your home.  Avoid having throw rugs at the top or bottom of stairs, or secure the rugs with carpet tape to prevent them from moving.  Choose a carpet design that does not hide the edge of steps on the stairs.  Check any carpeting to make sure that it is firmly attached to the stairs. Fix any carpet that is loose or worn.  What can I do on the outside of my home?  Use bright outdoor lighting.  Regularly repair the edges of walkways and driveways and fix any cracks.  Remove high doorway thresholds.  Trim any shrubbery on the main path into your home.  Regularly check that handrails are securely fastened and in good repair. Both sides of all steps should have handrails.  Install guardrails along the edges of any raised decks or porches.  Clear walkways of debris and clutter, including tools and rocks.  Have leaves, snow, and ice cleared regularly.  Use sand or salt on walkways during winter months.  In the garage, clean up any spills right away, including grease or oil spills.  What other actions can I take?  Wear closed-toe shoes that fit well and support your feet. Wear shoes that have rubber soles or low heels.  Use mobility aids as needed, such as canes, walkers, scooters, and crutches.  Review your medicines with your health care provider. Some medicines can cause dizziness or changes in blood pressure, which  increase your risk of falling.  Talk with your health care provider about other ways that you can decrease your risk of falls. This may include working with a physical therapist or  to improve your strength, balance, and endurance.  Where to find more information  Centers for Disease Control and Prevention[place]IN: www.cdc.gov  National [place] on Aging: www.mauricio.nih.gov  Contact a health care provider if:  You are afraid of falling at home.  You feel weak, drowsy, or dizzy at home.  You fall at home.  Summary  There are many simple things that you can do to make your home safe and to help prevent falls.  Ways to make your home safe include removing tripping hazards and installing grab bars in the bathroom.  Ask for help when making these changes in your home.  This information is not intended to replace advice given to you by your health care provider. Make sure you discuss any questions you have with your health care provider.  Document Revised: 2022 Document Reviewed: 2021  [employer] Patient Education ©  [employer]    Medicare Wellness  Personal Prevention Plan of Service     Date of Office Visit:    Encounter Provider:  Meredith Lea Kehrer, MD  Place of Service:  [facility]  Patient Name: Annalee Melara  :  1940    As part of the Medicare Wellness portion of your visit today, we are providing you with this personalized preventive plan of services (PPPS). This plan is based upon recommendations of the [place] Preventive Services Task Force (USPSTF) and the Advisory Committee on Immunization Practices (ACIP).    This lists the preventive care services that should be considered, and provides dates of when you are due. Items listed as completed are up-to-date and do not require any further intervention.    Health Maintenance   Topic Date Due    TDAP/TD VACCINES (1 - Tdap) Never done    ANNUAL WELLNESS VISIT  2023    LIPID PANEL   02/14/2023    DXA SCAN  03/16/2023    COVID-19 Vaccine  Completed    INFLUENZA VACCINE  Completed    Pneumococcal Vaccine 65+  Completed    ZOSTER VACCINE  Completed       Orders Placed This Encounter   Procedures    Comprehensive Metabolic Panel     Order Specific Question:   Release to patient     Answer:   Routine Release    Lipid Panel    TSH     Order Specific Question:   Release to patient     Answer:   Routine Release    Vitamin B12     Order Specific Question:   Release to patient     Answer:   Routine Release    CBC & Differential     Order Specific Question:   Manual Differential     Answer:   No       No follow-ups on file.

## 2023-02-20 NOTE — PROGRESS NOTES
The ABCs of the Annual Wellness Visit  Subsequent Medicare Wellness Visit    Subjective    Annalee Melara is a 82 y.o. female who presents for a Subsequent Medicare Wellness Visit.    The following portions of the patient's history were reviewed and   updated as appropriate: allergies, current medications, past family history, past medical history, past social history, past surgical history and problem list.    Compared to one year ago, the patient feels her physical   health is better.    Compared to one year ago, the patient feels her mental   health is better.    Recent Hospitalizations:  She was admitted within the past 365 days at [facility].  It was when she had her gallbladder removed.      Current Medical Providers:  Patient Care Team:  Kehrer, Meredith Lea, MD as PCP - General (Family Medicine)    Outpatient Medications Prior to Visit   Medication Sig Dispense Refill   • Combigan 0.2-0.5 % ophthalmic solution      • DULoxetine (CYMBALTA) 60 MG capsule Take 1 capsule by mouth once daily 90 capsule 0   • galantamine (RAZADYNE) 8 MG tablet Take 8 mg by mouth.     • latanoprost (XALATAN) 0.005 % ophthalmic solution INSTILL 1 DROP INTO EACH EYE AT BEDTIME     • lisinopril (PRINIVIL,ZESTRIL) 10 MG tablet Take 1 tablet by mouth Daily. 90 tablet 3   • memantine (NAMENDA) 10 MG tablet TAKE 1 TABLET BY MOUTH TWICE DAILY     • nitrofurantoin, macrocrystal-monohydrate, (MACROBID) 100 MG capsule Take 1 capsule by mouth once daily 90 capsule 0   • omeprazole (priLOSEC) 40 MG capsule Take 1 capsule by mouth Every Morning. 90 capsule 3   • prednisoLONE acetate (PRED FORTE) 1 % ophthalmic suspension INSTILL 1 DROP INTO EACH EYE 4 TIMES DAILY THEN FOLLOW TAPER SCHEDULE     • Xarelto 20 MG tablet Take 1 tablet by mouth once daily 90 tablet 0   • zolpidem (AMBIEN) 5 MG tablet Take 1 tablet by mouth At Night As Needed for Sleep. 30 tablet 2   • albuterol sulfate  (90 Base) MCG/ACT inhaler Inhale 2  puffs Every 4 (Four) Hours As Needed for Shortness of Air (cough). 6.7 g 2   • ezetimibe (ZETIA) 10 MG tablet Take 1 tablet by mouth once daily 30 tablet 0   • fluticasone (FLONASE) 50 MCG/ACT nasal spray 2 sprays into the nostril(s) as directed by provider Daily. 16 g 2   • ibandronate (Boniva) 150 MG tablet Take 1 tablet by mouth Every 30 (Thirty) Days. 3 tablet 3   • montelukast (Singulair) 10 MG tablet Take 1 tablet by mouth Every Night.     • trimethoprim-polymyxin b (POLYTRIM) 22594-6.1 UNIT/ML-% ophthalmic solution        No facility-administered medications prior to visit.       No opioid medication identified on active medication list. I have reviewed chart for other potential  high risk medication/s and harmful drug interactions in the elderly.          Aspirin is not on active medication list.  Aspirin use is not indicated based on review of current medical condition/s. Risk of harm outweighs potential benefits.  .    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  ACP discussion was held with the patient during this visit. Patient has an advance directive  "(not in EMR), copy requested.     Objective    Vitals:    02/20/23 1302   BP: 128/72   Pulse: 75   Temp: 97.3 °F (36.3 °C)   SpO2: 98%   Weight: 80.7 kg (178 lb)   Height: 160 cm (63\")     Estimated body mass index is 31.53 kg/m² as calculated from the following:    Height as of this encounter: 160 cm (63\").    Weight as of this encounter: 80.7 kg (178 lb).    BMI is >= 30 and <35. (Class 1 Obesity). The following options were offered after discussion;: none (medical contraindication)      Does the patient have evidence of cognitive impairment? Yes          HEALTH RISK ASSESSMENT    Smoking Status:  Social History     Tobacco Use   Smoking Status Never   Smokeless Tobacco Never     Alcohol Consumption:  Social History     Substance and Sexual Activity   Alcohol Use Not Currently     Fall Risk Screen:    STEADI Fall Risk Assessment was completed, and patient is at MODERATE risk for falls. Assessment completed on:2/20/2023    Depression Screening:  PHQ-2/PHQ-9 Depression Screening 2/20/2023   Little Interest or Pleasure in Doing Things 0-->not at all   Feeling Down, Depressed or Hopeless 0-->not at all   PHQ-9: Brief Depression Severity Measure Score 0       Health Habits and Functional and Cognitive Screening:  Functional & Cognitive Status 2/20/2023   Do you have difficulty preparing food and eating? No   Do you have difficulty bathing yourself, getting dressed or grooming yourself? No   Do you have difficulty using the toilet? No   Do you have difficulty moving around from place to place? No   Do you have trouble with steps or getting out of a bed or a chair? No   Current Diet Well Balanced Diet   Dental Exam Up to date   Eye Exam Up to date   Exercise (times per week) 0 times per week   Current Exercises Include No Regular Exercise   Do you need help using the phone?  No   Are you deaf or do you have serious difficulty hearing?  Yes   Do you need help with transportation? No   Do you need help shopping? Yes   Do " you need help preparing meals?  Yes   Do you need help with housework?  Yes   Do you need help with laundry? Yes   Do you need help taking your medications? No   Do you need help managing money? No   Do you ever drive or ride in a car without wearing a seat belt? No       Age-appropriate Screening Schedule:  Refer to the list below for future screening recommendations based on patient's age, sex and/or medical conditions. Orders for these recommended tests are listed in the plan section. The patient has been provided with a written plan.    Health Maintenance   Topic Date Due   • TDAP/TD VACCINES (1 - Tdap) Never done   • LIPID PANEL  02/14/2023   • DXA SCAN  03/16/2023   • INFLUENZA VACCINE  Completed   • ZOSTER VACCINE  Completed                CMS Preventative Services Quick Reference  Risk Factors Identified During Encounter  Fall Risk-High or Moderate: Discussed Fall Prevention in the home  The above risks/problems have been discussed with the patient.  Pertinent information has been shared with the patient in the After Visit Summary.  An After Visit Summary and PPPS were made available to the patient.    Follow Up:   Next Medicare Wellness visit to be scheduled in 1 year.       Additional E&M Note during same encounter follows:  Patient has multiple medical problems which are significant and separately identifiable that require additional work above and beyond the Medicare Wellness Visit.      Chief Complaint  Medicare Wellness-subsequent, Hyperlipidemia, Hypertension, and Depression    Subjective        HPI  Annalee Melara is also being seen today for follow-up on multiple chronic medical problems including history of PE with lupus anticoagulant, chronic insomnia on Ambien, depression, osteoporosis.  Patient states she is still requiring Ambien to sleep.  She gets no sleep without it.  She understand the risks of recurrent injuries with fall and accepts those risks.  Her depression is doing well.  She is  "compliant with her medications as listed.  She has not taken the simvastatin since her LFTs were up when she had her gallbladder out.         Objective   Vital Signs:  /72   Pulse 75   Temp 97.3 °F (36.3 °C)   Ht 160 cm (63\")   Wt 80.7 kg (178 lb)   SpO2 98%   BMI 31.53 kg/m²     Physical Exam  Vitals and nursing note reviewed.   Constitutional:       General: She is not in acute distress.     Appearance: Normal appearance. She is well-developed.   HENT:      Head: Normocephalic and atraumatic.      Right Ear: Tympanic membrane, ear canal and external ear normal.      Left Ear: Tympanic membrane, ear canal and external ear normal.      Nose: Nose normal.      Mouth/Throat:      Mouth: Mucous membranes are moist.      Pharynx: Oropharynx is clear. No oropharyngeal exudate or posterior oropharyngeal erythema.   Eyes:      Conjunctiva/sclera: Conjunctivae normal.      Pupils: Pupils are equal, round, and reactive to light.   Neck:      Thyroid: No thyromegaly.   Cardiovascular:      Rate and Rhythm: Normal rate and regular rhythm.      Heart sounds: No murmur heard.  Pulmonary:      Effort: Pulmonary effort is normal.      Breath sounds: Normal breath sounds. No wheezing.   Abdominal:      General: Abdomen is flat. Bowel sounds are normal. There is no distension.      Palpations: Abdomen is soft. There is no mass.      Tenderness: There is no abdominal tenderness.      Hernia: No hernia is present.   Musculoskeletal:         General: No swelling. Normal range of motion.      Cervical back: Normal range of motion and neck supple.      Right lower leg: No edema.      Left lower leg: No edema.   Lymphadenopathy:      Cervical: No cervical adenopathy.   Skin:     General: Skin is warm and dry.      Capillary Refill: Capillary refill takes less than 2 seconds.      Findings: Erythema present. No rash.      Comments: Venous stasis changes bilateral lower extremities with dry skin   Neurological:      General: No " focal deficit present.      Mental Status: She is alert and oriented to person, place, and time.      Cranial Nerves: No cranial nerve deficit.   Psychiatric:         Mood and Affect: Mood normal.         Behavior: Behavior normal.                    Drug Profile [id number] - Urine, Clean Catch (11/10/2022 15:40)       Assessment and Plan   Diagnoses and all orders for this visit:    1. Medicare annual wellness visit, subsequent (Primary)    2. Essential hypertension  -     CBC & Differential  -     Comprehensive Metabolic Panel  -     Lipid Panel  -     TSH  -     Vitamin B12    3. Depression, unspecified depression type    4. Chronic insomnia  -     zolpidem (AMBIEN) 5 MG tablet; Take 1 tablet by mouth At Night As Needed for Sleep.  Dispense: 30 tablet; Refill: 2    5. Chronic UTI    6. High risk medication use    7. Lupus anticoagulant syndrome (HCC)    8. At high risk for falls    9. Current use of proton pump inhibitor    Hypertension-controlled, continue current medication check labs  Depression-stable  Insomnia- stable on the Ambien, getting 6 hours of sleep, patient warned of the risk of using this at her age   Chronic UTI-no symptoms presently           Follow Up   No follow-ups on file.  Patient was given instructions and counseling regarding her condition or for health maintenance advice. Please see specific information pulled into the AVS if appropriate.

## 2023-02-21 LAB
ALBUMIN SERPL-MCNC: 4.3 G/DL (ref 3.6–4.6)
ALBUMIN/GLOB SERPL: 1.7 {RATIO} (ref 1.2–2.2)
ALP SERPL-CCNC: 161 IU/L (ref 44–121)
ALT SERPL-CCNC: 14 IU/L (ref 0–32)
AST SERPL-CCNC: 19 IU/L (ref 0–40)
BASOPHILS # BLD AUTO: 0.1 X10E3/UL (ref 0–0.2)
BASOPHILS NFR BLD AUTO: 1 %
BILIRUB SERPL-MCNC: 0.4 MG/DL (ref 0–1.2)
BUN SERPL-MCNC: 14 MG/DL (ref 8–27)
BUN/CREAT SERPL: 18 (ref 12–28)
CALCIUM SERPL-MCNC: 9.4 MG/DL (ref 8.7–10.3)
CHLORIDE SERPL-SCNC: 103 MMOL/L (ref 96–106)
CHOLEST SERPL-MCNC: 313 MG/DL (ref 100–199)
CO2 SERPL-SCNC: 22 MMOL/L (ref 20–29)
CREAT SERPL-MCNC: 0.78 MG/DL (ref 0.57–1)
EGFRCR SERPLBLD CKD-EPI 2021: 76 ML/MIN/1.73
EOSINOPHIL # BLD AUTO: 0.1 X10E3/UL (ref 0–0.4)
EOSINOPHIL NFR BLD AUTO: 2 %
ERYTHROCYTE [DISTWIDTH] IN BLOOD BY AUTOMATED COUNT: 13.1 % (ref 11.7–15.4)
GLOBULIN SER CALC-MCNC: 2.6 G/DL (ref 1.5–4.5)
GLUCOSE SERPL-MCNC: 100 MG/DL (ref 70–99)
HCT VFR BLD AUTO: 43.9 % (ref 34–46.6)
HDLC SERPL-MCNC: 65 MG/DL
HGB BLD-MCNC: 14.2 G/DL (ref 11.1–15.9)
IMM GRANULOCYTES # BLD AUTO: 0 X10E3/UL (ref 0–0.1)
IMM GRANULOCYTES NFR BLD AUTO: 0 %
LABORATORY COMMENT REPORT: ABNORMAL
LDLC SERPL CALC-MCNC: 210 MG/DL (ref 0–99)
LYMPHOCYTES # BLD AUTO: 2 X10E3/UL (ref 0.7–3.1)
LYMPHOCYTES NFR BLD AUTO: 28 %
MCH RBC QN AUTO: 28.8 PG (ref 26.6–33)
MCHC RBC AUTO-ENTMCNC: 32.3 G/DL (ref 31.5–35.7)
MCV RBC AUTO: 89 FL (ref 79–97)
MONOCYTES # BLD AUTO: 0.6 X10E3/UL (ref 0.1–0.9)
MONOCYTES NFR BLD AUTO: 8 %
NEUTROPHILS # BLD AUTO: 4.3 X10E3/UL (ref 1.4–7)
NEUTROPHILS NFR BLD AUTO: 61 %
PLATELET # BLD AUTO: 320 X10E3/UL (ref 150–450)
POTASSIUM SERPL-SCNC: 4.8 MMOL/L (ref 3.5–5.2)
PROT SERPL-MCNC: 6.9 G/DL (ref 6–8.5)
RBC # BLD AUTO: 4.93 X10E6/UL (ref 3.77–5.28)
SODIUM SERPL-SCNC: 141 MMOL/L (ref 134–144)
TRIGL SERPL-MCNC: 198 MG/DL (ref 0–149)
TSH SERPL DL<=0.005 MIU/L-ACNC: 1.01 UIU/ML (ref 0.45–4.5)
VIT B12 SERPL-MCNC: 568 PG/ML (ref 232–1245)
VLDLC SERPL CALC-MCNC: 38 MG/DL (ref 5–40)
WBC # BLD AUTO: 7 X10E3/UL (ref 3.4–10.8)

## 2023-02-24 NOTE — PROGRESS NOTES
"Chief Complaint  Edema (Legs)    Subjective          Annalee Melara presents to [facility]  Patient presents for follow-up.  She does not feel like the furosemide is getting any of the fluid off her legs.  We had a lengthy discussion about keeping her legs up but her new chair has not come in yet.    It hurts her back too much to lay on her couch.  We are waiting for her CT scan of her back.  She sees Dr. Cano next week to go over possible vein ablation therapy.  She has been wearing her support hose every day.  Her back pain still bothers her constantly.  Her shortness of breath is about the same.        Objective   Vital Signs:   /76   Pulse 96   Temp 97.1 °F (36.2 °C)   Ht 160 cm (63\")   Wt 93.3 kg (205 lb 9.6 oz)   SpO2 98%   BMI 36.42 kg/m²     Physical Exam  Constitutional:       General: She is not in acute distress.     Appearance: Normal appearance. She is well-developed.   HENT:      Head: Normocephalic and atraumatic.      Right Ear: External ear normal.      Left Ear: External ear normal.      Mouth/Throat:      Mouth: Mucous membranes are moist.      Pharynx: Oropharynx is clear.   Eyes:      Conjunctiva/sclera: Conjunctivae normal.      Pupils: Pupils are equal, round, and reactive to light.   Neck:      Thyroid: No thyromegaly.   Cardiovascular:      Rate and Rhythm: Normal rate and regular rhythm.      Heart sounds: No murmur heard.     Pulmonary:      Effort: Pulmonary effort is normal.      Breath sounds: Normal breath sounds. No wheezing.   Abdominal:      General: Bowel sounds are normal.      Palpations: Abdomen is soft.      Tenderness: There is no abdominal tenderness.   Musculoskeletal:         General: Normal range of motion.      Cervical back: Neck supple.      Right lower leg: Edema present.      Left lower leg: Edema present.      Comments: hose in place   Lymphadenopathy:      Cervical: No cervical adenopathy.   Skin:     General: Skin is " Compensated   warm and dry.   Neurological:      Mental Status: She is alert and oriented to person, place, and time.   Psychiatric:         Mood and Affect: Mood normal.         Behavior: Behavior normal.        Result Review :                 Assessment and Plan    Diagnoses and all orders for this visit:    1. Localized edema (Primary)  -     Cancel: Basic metabolic panel; Future  -     Adult Transthoracic Echo Complete W/ Cont if Necessary Per Protocol; Future  -     Basic metabolic panel    2. Essential hypertension  -     Cancel: Basic metabolic panel; Future  -     Basic metabolic panel    3. Venous stasis    4. Dyspnea, unspecified type  -     Adult Transthoracic Echo Complete W/ Cont if Necessary Per Protocol; Future    5. Chronic midline low back pain without sciatica    6. Compression fracture of lumbar vertebra, unspecified lumbar vertebral level, sequela    7. Compression fracture of body of thoracic vertebra (CMS/HCC)    Edema-we will check labs today and order an echo even though her BNP was normal, may consider increasing or adjusting diuretics  Venous stasis-keep follow-up with Dr. Cano  History of compression fracture-we will check CT to look for new lesions due to worsening pain and inconclusive x-rays.    Follow Up   No follow-ups on file.  Patient was given instructions and counseling regarding her condition or for health maintenance advice. Please see specific information pulled into the AVS if appropriate.

## 2023-03-16 DIAGNOSIS — F32.A DEPRESSION, UNSPECIFIED DEPRESSION TYPE: ICD-10-CM

## 2023-03-16 RX ORDER — DULOXETIN HYDROCHLORIDE 60 MG/1
CAPSULE, DELAYED RELEASE ORAL
Qty: 90 CAPSULE | Refills: 0 | Status: SHIPPED | OUTPATIENT
Start: 2023-03-16

## 2023-04-07 DIAGNOSIS — I26.99 PULMONARY EMBOLISM, UNSPECIFIED CHRONICITY, UNSPECIFIED PULMONARY EMBOLISM TYPE, UNSPECIFIED WHETHER ACUTE COR PULMONALE PRESENT: ICD-10-CM

## 2023-04-07 RX ORDER — RIVAROXABAN 20 MG/1
TABLET, FILM COATED ORAL
Qty: 90 TABLET | Refills: 0 | Status: SHIPPED | OUTPATIENT
Start: 2023-04-07

## 2023-04-10 DIAGNOSIS — N39.0 CHRONIC UTI: ICD-10-CM

## 2023-04-10 RX ORDER — NITROFURANTOIN 25; 75 MG/1; MG/1
CAPSULE ORAL
Qty: 90 CAPSULE | Refills: 0 | Status: SHIPPED | OUTPATIENT
Start: 2023-04-10

## 2023-05-30 DIAGNOSIS — F51.04 CHRONIC INSOMNIA: ICD-10-CM

## 2023-05-30 RX ORDER — ZOLPIDEM TARTRATE 5 MG/1
5 TABLET ORAL NIGHTLY PRN
Qty: 30 TABLET | Refills: 0 | OUTPATIENT
Start: 2023-05-30

## 2023-05-30 NOTE — TELEPHONE ENCOUNTER
Left detailed message for pt stating that she needs an appt for any additional refills per verbal agreement

## 2023-05-30 NOTE — TELEPHONE ENCOUNTER
Needs to be seen every 3 months for this medication.  We will authorize a refill after she has an appointment on the books.

## 2023-05-31 ENCOUNTER — OFFICE VISIT (OUTPATIENT)
Dept: FAMILY MEDICINE CLINIC | Facility: CLINIC | Age: 83
End: 2023-05-31

## 2023-05-31 VITALS
WEIGHT: 177 LBS | HEART RATE: 73 BPM | DIASTOLIC BLOOD PRESSURE: 72 MMHG | HEIGHT: 63 IN | SYSTOLIC BLOOD PRESSURE: 124 MMHG | TEMPERATURE: 98 F | OXYGEN SATURATION: 100 % | BODY MASS INDEX: 31.36 KG/M2

## 2023-05-31 DIAGNOSIS — F32.A DEPRESSION, UNSPECIFIED DEPRESSION TYPE: ICD-10-CM

## 2023-05-31 DIAGNOSIS — N39.0 CHRONIC UTI: ICD-10-CM

## 2023-05-31 DIAGNOSIS — Z79.899 HIGH RISK MEDICATION USE: ICD-10-CM

## 2023-05-31 DIAGNOSIS — I10 ESSENTIAL HYPERTENSION: ICD-10-CM

## 2023-05-31 DIAGNOSIS — F51.04 CHRONIC INSOMNIA: Primary | ICD-10-CM

## 2023-05-31 PROCEDURE — 3074F SYST BP LT 130 MM HG: CPT | Performed by: FAMILY MEDICINE

## 2023-05-31 PROCEDURE — 1160F RVW MEDS BY RX/DR IN RCRD: CPT | Performed by: FAMILY MEDICINE

## 2023-05-31 PROCEDURE — 3078F DIAST BP <80 MM HG: CPT | Performed by: FAMILY MEDICINE

## 2023-05-31 PROCEDURE — 99214 OFFICE O/P EST MOD 30 MIN: CPT | Performed by: FAMILY MEDICINE

## 2023-05-31 PROCEDURE — 1159F MED LIST DOCD IN RCRD: CPT | Performed by: FAMILY MEDICINE

## 2023-05-31 RX ORDER — ZOLPIDEM TARTRATE 5 MG/1
5 TABLET ORAL NIGHTLY PRN
Qty: 30 TABLET | Refills: 2 | Status: SHIPPED | OUTPATIENT
Start: 2023-05-31

## 2023-05-31 NOTE — PROGRESS NOTES
"Chief Complaint  Med Refill, Insomnia, Hyperlipidemia, Hypertension, and Depression    Subjective        Annalee Melara presents to [facility]  History of Present Illness  Patient presents for 3-month follow-up on her chronic insomnia.  She is up-to-date with her labs for her hypertension.  She is doing well in general and getting 5 to 6 hours of sleep on the Ambien.  She does not sleep at all without it.  We have been many discussions about it being on the beers list.  She is willing to take the risk as nothing else has helped with her insomnia.          Objective   Vital Signs:  /72   Pulse 73   Temp 98 °F (36.7 °C)   Ht 160 cm (63\")   Wt 80.3 kg (177 lb)   SpO2 100%   BMI 31.35 kg/m²   Estimated body mass index is 31.35 kg/m² as calculated from the following:    Height as of this encounter: 160 cm (63\").    Weight as of this encounter: 80.3 kg (177 lb).             Physical Exam  Constitutional:       General: She is not in acute distress.     Appearance: Normal appearance. She is well-developed.   HENT:      Head: Normocephalic and atraumatic.      Right Ear: External ear normal.      Left Ear: External ear normal.      Mouth/Throat:      Mouth: Mucous membranes are moist.      Pharynx: Oropharynx is clear.   Eyes:      Conjunctiva/sclera: Conjunctivae normal.      Pupils: Pupils are equal, round, and reactive to light.   Neck:      Thyroid: No thyromegaly.   Cardiovascular:      Rate and Rhythm: Normal rate and regular rhythm.      Heart sounds: No murmur heard.  Pulmonary:      Effort: Pulmonary effort is normal.      Breath sounds: Normal breath sounds. No wheezing.   Musculoskeletal:         General: Normal range of motion.      Cervical back: Neck supple.      Right lower leg: Edema present.      Left lower leg: Edema present.   Lymphadenopathy:      Cervical: No cervical adenopathy.   Skin:     General: Skin is warm and dry.      Findings: Erythema present.    "   Comments: Stasis changes of bilateral feet   Neurological:      Mental Status: She is alert and oriented to person, place, and time.   Psychiatric:         Mood and Affect: Mood normal.         Behavior: Behavior normal.        Result Review :          CONTROLLED SUBSTANCE AGREEMENT - SCAN - CONTROLLED SUBSTANCE FORM (02/20/2023)           Assessment and Plan   Diagnoses and all orders for this visit:    1. Chronic insomnia (Primary)  -     zolpidem (AMBIEN) 5 MG tablet; Take 1 tablet by mouth At Night As Needed for Sleep.  Dispense: 30 tablet; Refill: 2    2. Essential hypertension    3. High risk medication use  -     Drug Profile [id number] - Urine, Clean Catch    4. Depression, unspecified depression type    5. Chronic UTI    Chronic insomnia-still dependent on Ambien for any sleep.  Will refill, get UDS up-to-date.  Contract is up-to-date and Jelani is reviewed.  Patient warned of the risk of using this medication at her age.  Hypertension-controlled, labs up to         Follow Up   Return in about 3 months (around 8/31/2023) for Recheck insomnia, HTN.  Patient was given instructions and counseling regarding her condition or for health maintenance advice. Please see specific information pulled into the AVS if appropriate.

## 2023-06-01 LAB
AMPHETAMINES UR QL SCN: NEGATIVE NG/ML
BARBITURATES UR QL: NEGATIVE NG/ML
BENZODIAZ UR QL SCN: NEGATIVE NG/ML
BZE UR QL: NEGATIVE NG/ML
CANNABINOIDS UR QL SCN: NEGATIVE NG/ML
CREAT UR-MCNC: 121.8 MG/DL (ref 20–300)
ETHANOL UR-MCNC: NEGATIVE %
MEPERIDINE UR QL: NEGATIVE NG/ML
METHADONE UR QL: NEGATIVE NG/ML
OPIATES UR QL SCN: NEGATIVE NG/ML
OXYCODONE+OXYMORPHONE UR QL SCN: NEGATIVE NG/ML
PCP UR QL: NEGATIVE NG/ML
PROPOXYPH UR QL: NEGATIVE NG/ML
TRAMADOL UR QL SCN: NEGATIVE NG/ML

## 2023-06-08 ENCOUNTER — TELEPHONE (OUTPATIENT)
Dept: FAMILY MEDICINE CLINIC | Facility: CLINIC | Age: 83
End: 2023-06-08

## 2023-06-08 DIAGNOSIS — F51.04 CHRONIC INSOMNIA: ICD-10-CM

## 2023-06-08 RX ORDER — ZOLPIDEM TARTRATE 5 MG/1
5 TABLET ORAL NIGHTLY PRN
Qty: 30 TABLET | Refills: 0 | Status: SHIPPED | OUTPATIENT
Start: 2023-06-08 | End: 2023-09-05

## 2023-06-08 NOTE — TELEPHONE ENCOUNTER
Caller: Annalee Melara    Relationship: Self    Best call back number: [phone] (Home)     What is the best time to reach you:ANY     Who are you requesting to speak with (clinical staff, provider,  specific staff member): CLINICAL     What was the call regarding: PATIENT STATES HER  PICKED UP HER MEDICATION AND MISPLACED IT. SHE NEEDS TO HAVE HER [facility] [address] [place] [address] - [phone]  - [phone]  [phone]  REPLACED.     Is it okay if the provider responds through MyChart: NO

## 2023-06-08 NOTE — TELEPHONE ENCOUNTER
I SPOKE WITH THE PHARMACIST AND IT WAS PICKED UP ON 5/31/23 AT 12PM,  THEY HAVE LOOKED FOR IT ALL WEEK, HER DAUGHTER IS COMING OUT THIS WEEKEND AND WILL LOOK IN THE CARE AGAIN.  THEY ARE WILLING TO PAY FULL PRICE FOR IT  WHICH WOULD BE $50 VERSES $2 IF THEY CAN NOT FIND IT

## 2023-06-08 NOTE — TELEPHONE ENCOUNTER
I will authorize an early this 1 time only.  If they are losing medications like this, it is likely better that they do not take them.

## 2023-06-10 DIAGNOSIS — F32.A DEPRESSION, UNSPECIFIED DEPRESSION TYPE: ICD-10-CM

## 2023-06-12 RX ORDER — DULOXETIN HYDROCHLORIDE 60 MG/1
60 CAPSULE, DELAYED RELEASE ORAL DAILY
Qty: 90 CAPSULE | Refills: 0 | Status: SHIPPED | OUTPATIENT
Start: 2023-06-12

## 2023-07-11 ENCOUNTER — TELEPHONE (OUTPATIENT)
Dept: FAMILY MEDICINE CLINIC | Facility: CLINIC | Age: 83
End: 2023-07-11

## 2023-07-11 NOTE — TELEPHONE ENCOUNTER
Pt informed of 24 hr instead of 72 hours voiced understanding    Faxed letter to Dr. Watson office

## 2023-07-11 NOTE — TELEPHONE ENCOUNTER
Caller: Annalee Melara    Relationship to patient: Self    Best call back number: [phone]     Patient is needing: PATIENT STATES THAT SHE WILL BE HAVING A TOOTH EXTRACTION SOON AND WAS TOLD SHE WILL NEED TO STOP TAKING THE MEDICATION XARELTO FOR 3 DAYS. IS NEEDING SOME PAPERWORK STATING THAT IT IS OKAY FOR HER TO STOP TAKING THIS MEDICATION, FAXED OVER TO DR. PERRY    [phone]

## 2023-09-05 DIAGNOSIS — F51.04 CHRONIC INSOMNIA: ICD-10-CM

## 2023-09-05 RX ORDER — ZOLPIDEM TARTRATE 5 MG/1
5 TABLET ORAL NIGHTLY PRN
Qty: 30 TABLET | Refills: 0 | Status: SHIPPED | OUTPATIENT
Start: 2023-09-05 | End: 2023-09-06

## 2023-09-06 ENCOUNTER — OFFICE VISIT (OUTPATIENT)
Dept: FAMILY MEDICINE CLINIC | Facility: CLINIC | Age: 83
End: 2023-09-06
Payer: MEDICARE

## 2023-09-06 VITALS
HEIGHT: 63 IN | OXYGEN SATURATION: 97 % | SYSTOLIC BLOOD PRESSURE: 124 MMHG | TEMPERATURE: 97 F | WEIGHT: 178 LBS | HEART RATE: 72 BPM | DIASTOLIC BLOOD PRESSURE: 70 MMHG | BODY MASS INDEX: 31.54 KG/M2

## 2023-09-06 DIAGNOSIS — I10 ESSENTIAL HYPERTENSION: Primary | ICD-10-CM

## 2023-09-06 DIAGNOSIS — Z79.899 HIGH RISK MEDICATION USE: ICD-10-CM

## 2023-09-06 DIAGNOSIS — D68.62 LUPUS ANTICOAGULANT SYNDROME: ICD-10-CM

## 2023-09-06 DIAGNOSIS — F51.04 CHRONIC INSOMNIA: ICD-10-CM

## 2023-09-06 DIAGNOSIS — N39.0 CHRONIC UTI: ICD-10-CM

## 2023-09-06 DIAGNOSIS — F32.A DEPRESSION, UNSPECIFIED DEPRESSION TYPE: ICD-10-CM

## 2023-09-06 PROBLEM — I26.99 PULMONARY EMBOLISM: Status: RESOLVED | Noted: 2021-03-24 | Resolved: 2023-09-06

## 2023-09-06 PROBLEM — I26.09 OTHER PULMONARY EMBOLISM WITH ACUTE COR PULMONALE: Status: RESOLVED | Noted: 2019-03-20 | Resolved: 2023-09-06

## 2023-09-06 PROCEDURE — 99214 OFFICE O/P EST MOD 30 MIN: CPT | Performed by: FAMILY MEDICINE

## 2023-09-06 PROCEDURE — 3074F SYST BP LT 130 MM HG: CPT | Performed by: FAMILY MEDICINE

## 2023-09-06 PROCEDURE — 3078F DIAST BP <80 MM HG: CPT | Performed by: FAMILY MEDICINE

## 2023-09-06 RX ORDER — ESZOPICLONE 2 MG/1
2 TABLET, FILM COATED ORAL NIGHTLY
Qty: 30 TABLET | Refills: 0 | Status: SHIPPED | OUTPATIENT
Start: 2023-09-06

## 2023-09-06 NOTE — PROGRESS NOTES
"Chief Complaint  Med Refill and Insomnia    Subjective        Annalee Melara presents to [facility]  History of Present Illness  Patient presents for 3-month follow-up on her insomnia hypertension and other medical problems.  She is doing well in general without complaints except her sleep.  Only gets about 2 hours of sleep with the Zolpidem.  When I first started seeing her she was taking 10 mg at night.  Fell asleep at 4:45 and got up at 7 am.  She is willing to try something else because she does not want to go into the [place] for sleep specialist.  She says her depression is doing well and she is compliant with her other medications.      Objective   Vital Signs:  /70   Pulse 72   Temp 97 °F (36.1 °C)   Ht 160 cm (63\")   Wt 80.7 kg (178 lb)   SpO2 97%   BMI 31.53 kg/m²   Estimated body mass index is 31.53 kg/m² as calculated from the following:    Height as of this encounter: 160 cm (63\").    Weight as of this encounter: 80.7 kg (178 lb).             Physical Exam  Constitutional:       General: She is not in acute distress.     Appearance: Normal appearance. She is well-developed.   HENT:      Head: Normocephalic and atraumatic.      Right Ear: Tympanic membrane, ear canal and external ear normal.      Left Ear: Tympanic membrane, ear canal and external ear normal.      Mouth/Throat:      Mouth: Mucous membranes are moist.      Pharynx: Oropharynx is clear.   Eyes:      Conjunctiva/sclera: Conjunctivae normal.      Pupils: Pupils are equal, round, and reactive to light.   Neck:      Thyroid: No thyromegaly.   Cardiovascular:      Rate and Rhythm: Normal rate and regular rhythm.      Heart sounds: No murmur heard.  Pulmonary:      Effort: Pulmonary effort is normal.      Breath sounds: Normal breath sounds. No wheezing.   Abdominal:      General: Bowel sounds are normal.      Palpations: Abdomen is soft.      Tenderness: There is no abdominal tenderness. "   Musculoskeletal:         General: Normal range of motion.      Cervical back: Neck supple.   Lymphadenopathy:      Cervical: No cervical adenopathy.   Skin:     General: Skin is warm and dry.   Neurological:      Mental Status: She is alert and oriented to person, place, and time. Mental status is at baseline.   Psychiatric:         Mood and Affect: Mood normal.         Behavior: Behavior normal.      Result Review :        Drug Profile [id number] - Urine, Clean Catch (05/31/2023 11:21)   CONTROLLED SUBSTANCE AGREEMENT - SCAN - CONTROLLED SUBSTANCE FORM (02/20/2023)          Assessment and Plan   Diagnoses and all orders for this visit:    1. Essential hypertension (Primary)  -     Basic Metabolic Panel  -     CBC & Differential    2. Chronic insomnia  -     eszopiclone (LUNESTA) 2 MG tablet; Take 1 tablet by mouth Every Night. Take immediately before bedtime  Dispense: 30 tablet; Refill: 0    3. Depression, unspecified depression type    4. High risk medication use    5. Chronic UTI    6. Lupus anticoagulant syndrome    Hypertension-controlled, continue current medication check labs  Insomnia-stop zolpidem and try Lunesta, follow-up in 1 month, discussed with patient at length that it will take a while for her body to adjust  Depression- stable continue current medication  High risk medication use-contract current and UDS 3 months ago  Chronic UTI-Macrobid still effective         Follow Up   Return in about 1 month (around 10/6/2023) for Recheck sleep.  Patient was given instructions and counseling regarding her condition or for health maintenance advice. Please see specific information pulled into the AVS if appropriate.

## 2023-09-07 LAB
BASOPHILS # BLD AUTO: 0.1 X10E3/UL (ref 0–0.2)
BASOPHILS NFR BLD AUTO: 1 %
BUN SERPL-MCNC: 15 MG/DL (ref 8–27)
BUN/CREAT SERPL: 17 (ref 12–28)
CALCIUM SERPL-MCNC: 9.6 MG/DL (ref 8.7–10.3)
CHLORIDE SERPL-SCNC: 104 MMOL/L (ref 96–106)
CO2 SERPL-SCNC: 21 MMOL/L (ref 20–29)
CREAT SERPL-MCNC: 0.88 MG/DL (ref 0.57–1)
EGFRCR SERPLBLD CKD-EPI 2021: 66 ML/MIN/1.73
EOSINOPHIL # BLD AUTO: 0.2 X10E3/UL (ref 0–0.4)
EOSINOPHIL NFR BLD AUTO: 2 %
ERYTHROCYTE [DISTWIDTH] IN BLOOD BY AUTOMATED COUNT: 13.3 % (ref 11.7–15.4)
GLUCOSE SERPL-MCNC: 123 MG/DL (ref 70–99)
HCT VFR BLD AUTO: 38.3 % (ref 34–46.6)
HGB BLD-MCNC: 12 G/DL (ref 11.1–15.9)
IMM GRANULOCYTES # BLD AUTO: 0 X10E3/UL (ref 0–0.1)
IMM GRANULOCYTES NFR BLD AUTO: 0 %
LYMPHOCYTES # BLD AUTO: 1.9 X10E3/UL (ref 0.7–3.1)
LYMPHOCYTES NFR BLD AUTO: 23 %
MCH RBC QN AUTO: 26.7 PG (ref 26.6–33)
MCHC RBC AUTO-ENTMCNC: 31.3 G/DL (ref 31.5–35.7)
MCV RBC AUTO: 85 FL (ref 79–97)
MONOCYTES # BLD AUTO: 0.7 X10E3/UL (ref 0.1–0.9)
MONOCYTES NFR BLD AUTO: 8 %
NEUTROPHILS # BLD AUTO: 5.2 X10E3/UL (ref 1.4–7)
NEUTROPHILS NFR BLD AUTO: 66 %
PLATELET # BLD AUTO: 321 X10E3/UL (ref 150–450)
POTASSIUM SERPL-SCNC: 5.1 MMOL/L (ref 3.5–5.2)
RBC # BLD AUTO: 4.5 X10E6/UL (ref 3.77–5.28)
SODIUM SERPL-SCNC: 141 MMOL/L (ref 134–144)
WBC # BLD AUTO: 8 X10E3/UL (ref 3.4–10.8)

## 2023-09-08 LAB
HBA1C MFR BLD: 5.7 % (ref 4.8–5.6)
WRITTEN AUTHORIZATION: NORMAL

## 2023-09-08 RX ORDER — OMEPRAZOLE 40 MG/1
CAPSULE, DELAYED RELEASE ORAL
Qty: 90 CAPSULE | Refills: 0 | Status: SHIPPED | OUTPATIENT
Start: 2023-09-08

## 2023-09-18 DIAGNOSIS — F32.A DEPRESSION, UNSPECIFIED DEPRESSION TYPE: ICD-10-CM

## 2023-09-18 RX ORDER — DULOXETIN HYDROCHLORIDE 60 MG/1
60 CAPSULE, DELAYED RELEASE ORAL DAILY
Qty: 90 CAPSULE | Refills: 0 | Status: SHIPPED | OUTPATIENT
Start: 2023-09-18

## 2023-09-28 ENCOUNTER — TELEPHONE (OUTPATIENT)
Dept: FAMILY MEDICINE CLINIC | Facility: CLINIC | Age: 83
End: 2023-09-28
Payer: MEDICARE

## 2023-09-28 DIAGNOSIS — F51.04 CHRONIC INSOMNIA: Primary | ICD-10-CM

## 2023-09-28 RX ORDER — ESZOPICLONE 3 MG/1
3 TABLET, FILM COATED ORAL NIGHTLY
Qty: 30 TABLET | Refills: 0 | Status: SHIPPED | OUTPATIENT
Start: 2023-09-28

## 2023-09-28 NOTE — TELEPHONE ENCOUNTER
Caller: Annalee Melara    Relationship: Self    Best call back number: [phone]     What is the best time to reach you: ANY TIME    Who are you requesting to speak with (clinical staff, provider,  specific staff member): DR. KEHRER    What was the call regarding: PATIENT CALLED STATING THE eszopiclone (LUNESTA) 2 MG tablet PRESCRIPTION IS NOT HELPING HER SLEEP. SHE TOOK IT AT MIDNIGHT LAST NIGHT AND STILL DID NOT GET TO SLEEP UNTIL 6:30 AM THIS MORNING. SHE WANTS TO KNOW IF THERE IS SOMETHING ELSE SHE CAN TRY OR IF SHE CAN GO BACK ON ZOLPIDEM.    PLEASE ADVISE    Is it okay if the provider responds through MyChart: NO, PHONE CALL  
Pt aware  
The zolpidem is only giving her a few hours sleep as well.  I will send in the 3 mg Lunesta for her to try.  Due to the fact that it was not working and the risk involved, I will not go back to the zolpidem.  The neck step would be for her to see a sleep specialist.  She needs to make sure she gives the Lunesta 30 days from when she last took the zolpidem to see if it works.  
Susana Velasco  (RN)  2020 14:20:38

## 2023-10-02 DIAGNOSIS — I26.99 PULMONARY EMBOLISM, UNSPECIFIED CHRONICITY, UNSPECIFIED PULMONARY EMBOLISM TYPE, UNSPECIFIED WHETHER ACUTE COR PULMONALE PRESENT: ICD-10-CM

## 2023-10-02 RX ORDER — RIVAROXABAN 20 MG/1
TABLET, FILM COATED ORAL
Qty: 90 TABLET | Refills: 0 | Status: SHIPPED | OUTPATIENT
Start: 2023-10-02

## 2023-10-07 DIAGNOSIS — N39.0 CHRONIC UTI: ICD-10-CM

## 2023-10-09 RX ORDER — NITROFURANTOIN 25; 75 MG/1; MG/1
100 CAPSULE ORAL DAILY
Qty: 30 CAPSULE | Refills: 0 | Status: SHIPPED | OUTPATIENT
Start: 2023-10-09

## 2023-10-25 ENCOUNTER — OFFICE VISIT (OUTPATIENT)
Dept: FAMILY MEDICINE CLINIC | Facility: CLINIC | Age: 83
End: 2023-10-25
Payer: MEDICARE

## 2023-10-25 VITALS
SYSTOLIC BLOOD PRESSURE: 124 MMHG | OXYGEN SATURATION: 94 % | WEIGHT: 176.6 LBS | BODY MASS INDEX: 31.29 KG/M2 | TEMPERATURE: 97.8 F | HEART RATE: 75 BPM | DIASTOLIC BLOOD PRESSURE: 76 MMHG | HEIGHT: 63 IN

## 2023-10-25 DIAGNOSIS — L30.9 DERMATITIS: ICD-10-CM

## 2023-10-25 DIAGNOSIS — F51.04 CHRONIC INSOMNIA: Primary | ICD-10-CM

## 2023-10-25 DIAGNOSIS — S00.411A EAR ABRASION, RIGHT, INITIAL ENCOUNTER: ICD-10-CM

## 2023-10-25 RX ORDER — CYCLOSPORINE 0.5 MG/ML
EMULSION OPHTHALMIC
COMMUNITY
Start: 2023-08-11

## 2023-10-25 RX ORDER — ZOLPIDEM TARTRATE 5 MG/1
TABLET ORAL
COMMUNITY
Start: 2023-09-06 | End: 2023-10-25

## 2023-10-25 RX ORDER — DARIDOREXANT 50 MG/1
50 TABLET, FILM COATED ORAL NIGHTLY
Qty: 10 TABLET | Refills: 0 | Status: SHIPPED | OUTPATIENT
Start: 2023-10-25

## 2023-10-25 NOTE — PROGRESS NOTES
"Chief Complaint  Med Refill, Insomnia (e), Earache, and spot on back     Subjective        Annalee Melara presents to [facility]  History of Present Illness    Objective   Vital Signs:  /76   Pulse 75   Temp 97.8 °F (36.6 °C)   Ht 160 cm (63\")   Wt 80.1 kg (176 lb 9.6 oz)   SpO2 94%   BMI 31.28 kg/m²   Estimated body mass index is 31.28 kg/m² as calculated from the following:    Height as of this encounter: 160 cm (63\").    Weight as of this encounter: 80.1 kg (176 lb 9.6 oz).             Physical Exam  Constitutional:       General: She is not in acute distress.     Appearance: Normal appearance. She is well-developed.   HENT:      Head: Normocephalic and atraumatic.      Right Ear: Ear canal and external ear normal.      Left Ear: Tympanic membrane, ear canal and external ear normal.      Ears:      Comments: Slight abrasion external anterior EAC without any cellulitis     Mouth/Throat:      Mouth: Mucous membranes are moist.      Pharynx: Oropharynx is clear.   Eyes:      Conjunctiva/sclera: Conjunctivae normal.      Pupils: Pupils are equal, round, and reactive to light.   Neck:      Thyroid: No thyromegaly.   Cardiovascular:      Rate and Rhythm: Normal rate and regular rhythm.      Heart sounds: No murmur heard.  Pulmonary:      Effort: Pulmonary effort is normal.      Breath sounds: Normal breath sounds. No wheezing.   Abdominal:      General: Bowel sounds are normal.      Palpations: Abdomen is soft.      Tenderness: There is no abdominal tenderness.   Musculoskeletal:         General: Normal range of motion.      Cervical back: Neck supple.      Right lower leg: Edema present.      Left lower leg: Edema present.   Lymphadenopathy:      Cervical: No cervical adenopathy.   Skin:     General: Skin is warm and dry.      Findings: Rash present.      Comments: Right mid back with hyperpigmented scaly rash   Neurological:      Mental Status: She is alert and oriented " to person, place, and time.   Psychiatric:         Mood and Affect: Mood normal.         Behavior: Behavior normal.        Result Review :                   Assessment and Plan   Diagnoses and all orders for this visit:    1. Chronic insomnia (Primary)  -     Daridorexant HCl (Quviviq) 50 MG tablet; Take 1 tablet by mouth Every Night.  Dispense: 10 tablet; Refill: 0    2. Ear abrasion, right, initial encounter    3. Dermatitis    Chronic insomnia-I feel the patient has had good response to Lunesta since she is getting 6 hours of sleep but she is not falling asleep, we will try q. for vacation she will let me know at the end of the 10 days how she is doing  Ear abrasion-patient reassured, using antibiotic ointment  Dermatitis-reassured, can try some cortisone on it         Follow Up   No follow-ups on file.  Patient was given instructions and counseling regarding her condition or for health maintenance advice. Please see specific information pulled into the AVS if appropriate.

## 2023-10-27 ENCOUNTER — TELEPHONE (OUTPATIENT)
Dept: FAMILY MEDICINE CLINIC | Facility: CLINIC | Age: 83
End: 2023-10-27
Payer: MEDICARE

## 2023-10-27 DIAGNOSIS — F51.04 CHRONIC INSOMNIA: Primary | ICD-10-CM

## 2023-10-27 RX ORDER — ESZOPICLONE 3 MG/1
3 TABLET, FILM COATED ORAL NIGHTLY
Qty: 10 TABLET | Refills: 0 | Status: SHIPPED | OUTPATIENT
Start: 2023-10-27 | End: 2023-11-06

## 2023-10-27 NOTE — TELEPHONE ENCOUNTER
Caller: Annalee Melara    Relationship: Self    Best call back number: [phone]     What medication are you requesting: SLEEP MEDICATION    What are your current symptoms: DIFFICULTY SLEEPING    If a prescription is needed, what is your preferred pharmacy and phone number: [facility] [address] [place] [address] - [phone]  - [phone] FX     Additional notes: PATIENT STATED SHE HAS NOT RECEIVED ANY NEW SLEEP MEDICATION AND WILL BE OUT OF MEDICATION FOR THE WEEKEND.    PATIENT IS REQUESTING A NEW MEDICATION BE SENT THROUGH INSURANCE ASAP.    PLEASE CALL TO INFORM WHEN THIS IS DONE.

## 2023-10-27 NOTE — TELEPHONE ENCOUNTER
MEDICATION IS NEEDING A PRIOR AUTH DONE. PATIENT IS OUT OF MEDS AND WOULD LIKE TO KNOW IF YOU CAN CALL SOME IN FOR THE WEEKEND

## 2023-10-30 ENCOUNTER — TELEPHONE (OUTPATIENT)
Dept: FAMILY MEDICINE CLINIC | Facility: CLINIC | Age: 83
End: 2023-10-30
Payer: MEDICARE

## 2023-11-01 DIAGNOSIS — I10 ESSENTIAL HYPERTENSION: ICD-10-CM

## 2023-11-01 RX ORDER — LISINOPRIL 10 MG/1
10 TABLET ORAL DAILY
Qty: 90 TABLET | Refills: 0 | Status: SHIPPED | OUTPATIENT
Start: 2023-11-01

## 2023-11-03 DIAGNOSIS — F51.04 CHRONIC INSOMNIA: ICD-10-CM

## 2023-11-03 DIAGNOSIS — N39.0 CHRONIC UTI: ICD-10-CM

## 2023-11-06 ENCOUNTER — TELEPHONE (OUTPATIENT)
Dept: FAMILY MEDICINE CLINIC | Facility: CLINIC | Age: 83
End: 2023-11-06
Payer: MEDICARE

## 2023-11-06 DIAGNOSIS — F51.04 CHRONIC INSOMNIA: ICD-10-CM

## 2023-11-06 RX ORDER — NITROFURANTOIN 25; 75 MG/1; MG/1
100 CAPSULE ORAL DAILY
Qty: 30 CAPSULE | Refills: 0 | Status: SHIPPED | OUTPATIENT
Start: 2023-11-06

## 2023-11-06 RX ORDER — ESZOPICLONE 3 MG/1
TABLET, FILM COATED ORAL
Qty: 30 TABLET | Refills: 2 | Status: SHIPPED | OUTPATIENT
Start: 2023-11-06

## 2023-12-05 DIAGNOSIS — N39.0 CHRONIC UTI: ICD-10-CM

## 2023-12-05 RX ORDER — NITROFURANTOIN 25; 75 MG/1; MG/1
100 CAPSULE ORAL DAILY
Qty: 30 CAPSULE | Refills: 0 | Status: SHIPPED | OUTPATIENT
Start: 2023-12-05

## 2023-12-05 RX ORDER — OMEPRAZOLE 40 MG/1
CAPSULE, DELAYED RELEASE ORAL
Qty: 90 CAPSULE | Refills: 0 | Status: SHIPPED | OUTPATIENT
Start: 2023-12-05

## 2023-12-05 NOTE — TELEPHONE ENCOUNTER
Called patient back to let her know that she needs to see Ortho to evaluate patient stated she would contact them.  
Caller: Annalee Melara    Relationship: Self    Best call back number: [phone]    What is the medical concern/diagnosis: BROKEN HIP    What specialty or service is being requested: PHYSICAL THERAPY    What is the provider, practice or medical service name: [facility]    What is the office location: [facility]    What is the office phone number: UNKNOWN    Any additional details: PATIENT IS ASKING FOR A NEW REFERRAL WITH ORDERS FOR PHYSICAL THERAPY TO BEGIN ASAP.         
Please advise  
Those orders should come from her orthopedist.  I may have done it for her before, but this far out I think she needs her Ortho to evaluate unless it is just global weakness she is having.  
No

## 2023-12-06 DIAGNOSIS — J40 BRONCHITIS: ICD-10-CM

## 2023-12-06 DIAGNOSIS — J44.1 COPD WITH EXACERBATION: ICD-10-CM

## 2023-12-06 RX ORDER — BENZONATATE 200 MG/1
200 CAPSULE ORAL 3 TIMES DAILY PRN
Qty: 30 CAPSULE | Refills: 0 | OUTPATIENT
Start: 2023-12-06

## 2023-12-13 DIAGNOSIS — F32.A DEPRESSION, UNSPECIFIED DEPRESSION TYPE: ICD-10-CM

## 2023-12-13 RX ORDER — DULOXETIN HYDROCHLORIDE 60 MG/1
60 CAPSULE, DELAYED RELEASE ORAL DAILY
Qty: 90 CAPSULE | Refills: 0 | Status: SHIPPED | OUTPATIENT
Start: 2023-12-13

## 2023-12-26 DIAGNOSIS — I26.99 PULMONARY EMBOLISM, UNSPECIFIED CHRONICITY, UNSPECIFIED PULMONARY EMBOLISM TYPE, UNSPECIFIED WHETHER ACUTE COR PULMONALE PRESENT: ICD-10-CM

## 2023-12-26 RX ORDER — RIVAROXABAN 20 MG/1
TABLET, FILM COATED ORAL
Qty: 90 TABLET | Refills: 0 | Status: SHIPPED | OUTPATIENT
Start: 2023-12-26

## 2023-12-29 DIAGNOSIS — N39.0 CHRONIC UTI: ICD-10-CM

## 2024-01-02 RX ORDER — NITROFURANTOIN 25; 75 MG/1; MG/1
100 CAPSULE ORAL DAILY
Qty: 30 CAPSULE | Refills: 0 | Status: SHIPPED | OUTPATIENT
Start: 2024-01-02

## 2024-01-25 ENCOUNTER — OFFICE VISIT (OUTPATIENT)
Dept: FAMILY MEDICINE CLINIC | Facility: CLINIC | Age: 84
End: 2024-01-25
Payer: MEDICARE

## 2024-01-25 VITALS
SYSTOLIC BLOOD PRESSURE: 122 MMHG | HEART RATE: 74 BPM | BODY MASS INDEX: 31.64 KG/M2 | OXYGEN SATURATION: 94 % | WEIGHT: 178.6 LBS | DIASTOLIC BLOOD PRESSURE: 70 MMHG | HEIGHT: 63 IN | TEMPERATURE: 98 F

## 2024-01-25 DIAGNOSIS — N39.0 CHRONIC UTI: ICD-10-CM

## 2024-01-25 DIAGNOSIS — D68.62 LUPUS ANTICOAGULANT SYNDROME: ICD-10-CM

## 2024-01-25 DIAGNOSIS — F51.04 CHRONIC INSOMNIA: Primary | ICD-10-CM

## 2024-01-25 DIAGNOSIS — F32.A DEPRESSION, UNSPECIFIED DEPRESSION TYPE: ICD-10-CM

## 2024-01-25 DIAGNOSIS — I10 ESSENTIAL HYPERTENSION: ICD-10-CM

## 2024-01-25 DIAGNOSIS — R73.03 PREDIABETES: ICD-10-CM

## 2024-01-25 DIAGNOSIS — Z79.01 LONG TERM (CURRENT) USE OF ANTICOAGULANTS: ICD-10-CM

## 2024-01-25 RX ORDER — DARIDOREXANT 50 MG/1
50 TABLET, FILM COATED ORAL NIGHTLY
Qty: 30 TABLET | Refills: 2 | Status: SHIPPED | OUTPATIENT
Start: 2024-01-25

## 2024-01-25 RX ORDER — ESZOPICLONE 3 MG/1
3 TABLET, FILM COATED ORAL NIGHTLY
Qty: 30 TABLET | Refills: 0 | Status: SHIPPED | OUTPATIENT
Start: 2024-01-25

## 2024-01-25 NOTE — PROGRESS NOTES
"Chief Complaint  Med Refill, Hypertension, Depression, Insomnia, and Hyperlipidemia    Subjective        Annalee Melara presents to [facility]  History of Present Illness  Presents for follow-up on her chronic insomnia, hypertension, chronic UTI, depression and prediabetes.    She also has a history of hypercoagulable state and is compliant with her Xarelto.  She is concerned that she is only getting 5 hours of sleep with Lunesta.  She told me last night she did not go to bed till 2:30 in the morning because she was reading a book she wanted to finish.        Objective   Vital Signs:  /70   Pulse 74   Temp 98 °F (36.7 °C)   Ht 160 cm (63\")   Wt 81 kg (178 lb 9.6 oz)   SpO2 94%   BMI 31.64 kg/m²   Estimated body mass index is 31.64 kg/m² as calculated from the following:    Height as of this encounter: 160 cm (63\").    Weight as of this encounter: 81 kg (178 lb 9.6 oz).             Physical Exam  Constitutional:       General: She is not in acute distress.     Appearance: Normal appearance. She is well-developed.   HENT:      Head: Normocephalic and atraumatic.      Right Ear: Tympanic membrane, ear canal and external ear normal.      Left Ear: Tympanic membrane, ear canal and external ear normal.      Mouth/Throat:      Mouth: Mucous membranes are moist.      Pharynx: Oropharynx is clear.   Eyes:      Conjunctiva/sclera: Conjunctivae normal.      Pupils: Pupils are equal, round, and reactive to light.   Neck:      Thyroid: No thyromegaly.   Cardiovascular:      Rate and Rhythm: Normal rate and regular rhythm.      Heart sounds: No murmur heard.  Pulmonary:      Effort: Pulmonary effort is normal.      Breath sounds: Normal breath sounds. No wheezing.   Abdominal:      Palpations: Abdomen is soft.      Tenderness: There is no abdominal tenderness.   Musculoskeletal:         General: Normal range of motion.      Cervical back: Neck supple.   Lymphadenopathy:      " Cervical: No cervical adenopathy.   Skin:     General: Skin is warm and dry.   Neurological:      Mental Status: She is alert. Mental status is at baseline.      Motor: Weakness present.      Gait: Gait abnormal.      Comments: Gait slow, generalized weakness   Psychiatric:         Mood and Affect: Mood normal.         Behavior: Behavior normal.        Result Review :                     Assessment and Plan     Diagnoses and all orders for this visit:    1. Chronic insomnia (Primary)  -     eszopiclone (LUNESTA) 3 MG tablet; Take 1 tablet by mouth Every Night. Take immediately before bedtime  Dispense: 30 tablet; Refill: 0  -     Daridorexant HCl (Quviviq) 50 MG tablet; Take 1 tablet by mouth Every Night. Indications: Trouble Sleeping  Dispense: 30 tablet; Refill: 2    2. Essential hypertension  -     CBC & Differential  -     Basic Metabolic Panel    3. Chronic UTI    4. Lupus anticoagulant syndrome    5. Long term (current) use of anticoagulants    6. Depression, unspecified depression type    7. Prediabetes  -     Hemoglobin A1c    Chronic insomnia-will do a trial of Quviviq, I did send in Lunesta in case she cannot get the Quviviq or takes a while, reassured patient that our goal is for her only to have 5 to 6 hours of sleep, we discussed sleep hygiene  Hypertension-controlled, continue current medication check labs  Chronic UTI-continue preventative medicine         Follow Up     Return in about 3 months (around 4/25/2024) for Medicare Wellness.  Patient was given instructions and counseling regarding her condition or for health maintenance advice. Please see specific information pulled into the AVS if appropriate.

## 2024-01-26 LAB
BASOPHILS # BLD AUTO: 0.1 X10E3/UL (ref 0–0.2)
BASOPHILS NFR BLD AUTO: 1 %
BUN SERPL-MCNC: 18 MG/DL (ref 8–27)
BUN/CREAT SERPL: 21 (ref 12–28)
CALCIUM SERPL-MCNC: 9.3 MG/DL (ref 8.7–10.3)
CHLORIDE SERPL-SCNC: 104 MMOL/L (ref 96–106)
CO2 SERPL-SCNC: 23 MMOL/L (ref 20–29)
CREAT SERPL-MCNC: 0.85 MG/DL (ref 0.57–1)
EGFRCR SERPLBLD CKD-EPI 2021: 68 ML/MIN/1.73
EOSINOPHIL # BLD AUTO: 0.2 X10E3/UL (ref 0–0.4)
EOSINOPHIL NFR BLD AUTO: 2 %
ERYTHROCYTE [DISTWIDTH] IN BLOOD BY AUTOMATED COUNT: 14.7 % (ref 11.7–15.4)
GLUCOSE SERPL-MCNC: 102 MG/DL (ref 70–99)
HBA1C MFR BLD: 5.9 % (ref 4.8–5.6)
HCT VFR BLD AUTO: 34.5 % (ref 34–46.6)
HGB BLD-MCNC: 10.8 G/DL (ref 11.1–15.9)
IMM GRANULOCYTES # BLD AUTO: 0 X10E3/UL (ref 0–0.1)
IMM GRANULOCYTES NFR BLD AUTO: 0 %
LYMPHOCYTES # BLD AUTO: 2.2 X10E3/UL (ref 0.7–3.1)
LYMPHOCYTES NFR BLD AUTO: 22 %
MCH RBC QN AUTO: 24.9 PG (ref 26.6–33)
MCHC RBC AUTO-ENTMCNC: 31.3 G/DL (ref 31.5–35.7)
MCV RBC AUTO: 80 FL (ref 79–97)
MONOCYTES # BLD AUTO: 0.8 X10E3/UL (ref 0.1–0.9)
MONOCYTES NFR BLD AUTO: 8 %
NEUTROPHILS # BLD AUTO: 6.5 X10E3/UL (ref 1.4–7)
NEUTROPHILS NFR BLD AUTO: 67 %
PLATELET # BLD AUTO: 321 X10E3/UL (ref 150–450)
POTASSIUM SERPL-SCNC: 5.2 MMOL/L (ref 3.5–5.2)
RBC # BLD AUTO: 4.34 X10E6/UL (ref 3.77–5.28)
SODIUM SERPL-SCNC: 142 MMOL/L (ref 134–144)
WBC # BLD AUTO: 9.7 X10E3/UL (ref 3.4–10.8)

## 2024-01-29 DIAGNOSIS — I10 ESSENTIAL HYPERTENSION: ICD-10-CM

## 2024-01-29 RX ORDER — LISINOPRIL 10 MG/1
10 TABLET ORAL DAILY
Qty: 90 TABLET | Refills: 0 | Status: SHIPPED | OUTPATIENT
Start: 2024-01-29

## 2024-01-30 DIAGNOSIS — N39.0 CHRONIC UTI: ICD-10-CM

## 2024-01-30 RX ORDER — NITROFURANTOIN 25; 75 MG/1; MG/1
100 CAPSULE ORAL DAILY
Qty: 30 CAPSULE | Refills: 0 | Status: SHIPPED | OUTPATIENT
Start: 2024-01-30

## 2024-02-01 LAB
FERRITIN SERPL-MCNC: 15 NG/ML (ref 15–150)
FOLATE SERPL-MCNC: >20 NG/ML
IRON SERPL-MCNC: 30 UG/DL (ref 27–139)
VIT B12 SERPL-MCNC: 912 PG/ML (ref 232–1245)
WRITTEN AUTHORIZATION: NORMAL

## 2024-02-08 DIAGNOSIS — F51.04 CHRONIC INSOMNIA: ICD-10-CM

## 2024-02-08 RX ORDER — ESZOPICLONE 3 MG/1
TABLET, FILM COATED ORAL
Qty: 30 TABLET | Refills: 0 | Status: SHIPPED | OUTPATIENT
Start: 2024-02-08

## 2024-02-23 DIAGNOSIS — N39.0 CHRONIC UTI: ICD-10-CM

## 2024-02-23 RX ORDER — NITROFURANTOIN 25; 75 MG/1; MG/1
100 CAPSULE ORAL DAILY
Qty: 30 CAPSULE | Refills: 0 | Status: SHIPPED | OUTPATIENT
Start: 2024-02-23

## 2024-02-28 RX ORDER — OMEPRAZOLE 40 MG/1
CAPSULE, DELAYED RELEASE ORAL
Qty: 90 CAPSULE | Refills: 0 | Status: SHIPPED | OUTPATIENT
Start: 2024-02-28

## 2024-03-09 DIAGNOSIS — F32.A DEPRESSION, UNSPECIFIED DEPRESSION TYPE: ICD-10-CM

## 2024-03-11 RX ORDER — DULOXETIN HYDROCHLORIDE 60 MG/1
60 CAPSULE, DELAYED RELEASE ORAL DAILY
Qty: 90 CAPSULE | Refills: 0 | Status: SHIPPED | OUTPATIENT
Start: 2024-03-11

## 2024-03-25 DIAGNOSIS — F51.04 CHRONIC INSOMNIA: ICD-10-CM

## 2024-03-25 RX ORDER — ZOLPIDEM TARTRATE 5 MG/1
5 TABLET ORAL NIGHTLY PRN
Qty: 30 TABLET | Refills: 0 | OUTPATIENT
Start: 2024-03-25

## 2024-03-27 DIAGNOSIS — I26.99 PULMONARY EMBOLISM, UNSPECIFIED CHRONICITY, UNSPECIFIED PULMONARY EMBOLISM TYPE, UNSPECIFIED WHETHER ACUTE COR PULMONALE PRESENT: ICD-10-CM

## 2024-03-27 RX ORDER — RIVAROXABAN 20 MG/1
TABLET, FILM COATED ORAL
Qty: 90 TABLET | Refills: 0 | Status: SHIPPED | OUTPATIENT
Start: 2024-03-27

## 2024-03-29 DIAGNOSIS — N39.0 CHRONIC UTI: ICD-10-CM

## 2024-03-29 RX ORDER — NITROFURANTOIN 25; 75 MG/1; MG/1
100 CAPSULE ORAL DAILY
Qty: 30 CAPSULE | Refills: 0 | Status: SHIPPED | OUTPATIENT
Start: 2024-03-29

## 2024-04-08 DIAGNOSIS — F51.04 CHRONIC INSOMNIA: ICD-10-CM

## 2024-04-08 RX ORDER — ESZOPICLONE 3 MG/1
TABLET, FILM COATED ORAL
Qty: 30 TABLET | Refills: 0 | Status: SHIPPED | OUTPATIENT
Start: 2024-04-08

## 2024-04-25 ENCOUNTER — OFFICE VISIT (OUTPATIENT)
Dept: FAMILY MEDICINE CLINIC | Facility: CLINIC | Age: 84
End: 2024-04-25
Payer: MEDICARE

## 2024-04-25 VITALS
DIASTOLIC BLOOD PRESSURE: 74 MMHG | WEIGHT: 179.6 LBS | BODY MASS INDEX: 31.82 KG/M2 | OXYGEN SATURATION: 97 % | HEART RATE: 67 BPM | SYSTOLIC BLOOD PRESSURE: 124 MMHG | HEIGHT: 63 IN | TEMPERATURE: 96.9 F

## 2024-04-25 DIAGNOSIS — D68.62 LUPUS ANTICOAGULANT SYNDROME: ICD-10-CM

## 2024-04-25 DIAGNOSIS — R73.03 PREDIABETES: ICD-10-CM

## 2024-04-25 DIAGNOSIS — I10 ESSENTIAL HYPERTENSION: ICD-10-CM

## 2024-04-25 DIAGNOSIS — F32.A DEPRESSION, UNSPECIFIED DEPRESSION TYPE: ICD-10-CM

## 2024-04-25 DIAGNOSIS — F51.04 CHRONIC INSOMNIA: ICD-10-CM

## 2024-04-25 DIAGNOSIS — Z00.00 MEDICARE ANNUAL WELLNESS VISIT, SUBSEQUENT: Primary | ICD-10-CM

## 2024-04-25 DIAGNOSIS — Z79.01 LONG TERM (CURRENT) USE OF ANTICOAGULANTS: ICD-10-CM

## 2024-04-25 DIAGNOSIS — N39.0 CHRONIC UTI: ICD-10-CM

## 2024-04-25 NOTE — PATIENT INSTRUCTIONS
Medicare Wellness  Personal Prevention Plan of Service     Date of Office Visit:    Encounter Provider:  Meredith Lea Kehrer, MD  Place of Service:  [facility]  Patient Name: Annalee Melara  :  1940    As part of the Medicare Wellness portion of your visit today, we are providing you with this personalized preventive plan of services (PPPS). This plan is based upon recommendations of the [place] Preventive Services Task Force (USPSTF) and the Advisory Committee on Immunization Practices (ACIP).    This lists the preventive care services that should be considered, and provides dates of when you are due. Items listed as completed are up-to-date and do not require any further intervention.    Health Maintenance   Topic Date Due    TDAP/TD VACCINES (1 - Tdap) Never done    RSV Vaccine - Adults (1 - 1-dose 60+ series) Never done    BMI FOLLOWUP  2023    ANNUAL WELLNESS VISIT  2024    LIPID PANEL  2024    DXA SCAN  2024 (Originally 3/16/2023)    INFLUENZA VACCINE  2024    COVID-19 Vaccine  Completed    Pneumococcal Vaccine 65+  Completed    ZOSTER VACCINE  Completed       Orders Placed This Encounter   Procedures    Hemoglobin A1c     Order Specific Question:   Release to patient     Answer:   Routine Release [[id number]]    Comprehensive Metabolic Panel     Order Specific Question:   Release to patient     Answer:   Routine Release [[id number]]    TSH     Order Specific Question:   Release to patient     Answer:   Routine Release [[id number]]    Ambulatory Referral to Sleep Medicine     Referral Priority:   Routine     Referral Type:   Consultation     Referred to Provider:   Gwyn Khalil MD     Number of Visits Requested:   1    CBC & Differential     Order Specific Question:   Manual Differential     Answer:   No     Order Specific Question:   Release to patient     Answer:   Routine Release [[id number]]       Return in about 3 months  (around 7/25/2024).

## 2024-04-25 NOTE — PROGRESS NOTES
The ABCs of the Annual Wellness Visit  Subsequent Medicare Wellness Visit    Subjective    Annalee Melara is a 83 y.o. female who presents for a Subsequent Medicare Wellness Visit.    The following portions of the patient's history were reviewed and   updated as appropriate: allergies, current medications, past family history, past medical history, past social history, past surgical history, and problem list.    Compared to one year ago, the patient feels her physical   health is worse. Sleeping problenms.    Compared to one year ago, the patient feels her mental   health is the same.    Recent Hospitalizations:  She was not admitted to the hospital during the last year.       Current Medical Providers:  Patient Care Team:  Kehrer, Meredith Lea, MD as PCP - General (Family Medicine)    Outpatient Medications Prior to Visit   Medication Sig Dispense Refill    Combigan 0.2-0.5 % ophthalmic solution       DULoxetine (CYMBALTA) 60 MG capsule Take 1 capsule by mouth once daily 90 capsule 0    eszopiclone (LUNESTA) 3 MG tablet TAKE 1 TABLET BY MOUTH NIGHTLY TAKE  IMMEDIATELY  BEFORE  BEDTIME 30 tablet 0    ezetimibe (ZETIA) 10 MG tablet Take 1 tablet by mouth once daily 30 tablet 0    galantamine ER (RAZADYNE ER) 8 MG 24 hr capsule Take 1 capsule by mouth Every 12 (Twelve) Hours.      latanoprost (XALATAN) 0.005 % ophthalmic solution INSTILL 1 DROP INTO EACH EYE AT BEDTIME      lisinopril (PRINIVIL,ZESTRIL) 10 MG tablet Take 1 tablet by mouth once daily 90 tablet 0    memantine (NAMENDA) 10 MG tablet TAKE 1 TABLET BY MOUTH TWICE DAILY      nitrofurantoin, macrocrystal-monohydrate, (MACROBID) 100 MG capsule Take 1 capsule by mouth once daily 30 capsule 0    omeprazole (priLOSEC) 40 MG capsule Take 1 capsule by mouth once daily in the morning 90 capsule 0    prednisoLONE acetate (PRED FORTE) 1 % ophthalmic suspension INSTILL 1 DROP INTO EACH EYE 4 TIMES DAILY THEN FOLLOW TAPER SCHEDULE      Xarelto 20 MG tablet Take 1  "tablet by mouth once daily 90 tablet 0    Daridorexant HCl (Quviviq) 50 MG tablet Take 1 tablet by mouth Every Night. Indications: Trouble Sleeping 30 tablet 2     No facility-administered medications prior to visit.       No opioid medication identified on active medication list. I have reviewed chart for other potential  high risk medication/s and harmful drug interactions in the elderly.        Aspirin is not on active medication list.  Aspirin use is not indicated based on review of current medical condition/s. Risk of harm outweighs potential benefits.  .    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  ACP discussion was held with the patient during this visit. Patient has an advance directive (not in EMR), copy requested.     Objective    Vitals:    04/25/24 1456   BP: 124/74   Pulse: 67   Temp: 96.9 °F (36.1 °C)   TempSrc: Temporal   SpO2: 97%   Weight: 81.5 kg (179 lb 9.6 oz)   Height: 160 cm (62.99\")     Estimated body mass index is 31.82 kg/m² as calculated from the following:    Height as of this encounter: 160 cm (62.99\").    Weight as of this encounter: 81.5 kg (179 lb 9.6 oz).    BMI is >= 30 and <35. (Class 1 Obesity). The following options " were offered after discussion;: nutrition counseling/recommendations      Does the patient have evidence of cognitive impairment? Yes, seeing neurology          HEALTH RISK ASSESSMENT    Smoking Status:  Social History     Tobacco Use   Smoking Status Never   Smokeless Tobacco Never     Alcohol Consumption:  Social History     Substance and Sexual Activity   Alcohol Use Not Currently     Fall Risk Screen:    CALIN Fall Risk Assessment was completed, and patient is at LOW risk for falls.Assessment completed on:2024    Depression Screenin/25/2024     2:53 PM   PHQ-2/PHQ-9 Depression Screening   Little Interest or Pleasure in Doing Things 0-->not at all   Feeling Down, Depressed or Hopeless 0-->not at all   PHQ-9: Brief Depression Severity Measure Score 0       Health Habits and Functional and Cognitive Screenin/25/2024     2:53 PM   Functional & Cognitive Status   Do you have difficulty preparing food and eating? Yes   Do you have difficulty bathing yourself, getting dressed or grooming yourself? No   Do you have difficulty using the toilet? No   Do you have difficulty moving around from place to place? No   Do you have trouble with steps or getting out of a bed or a chair? Yes   Current Diet Well Balanced Diet   Dental Exam Up to date   Eye Exam Up to date   Exercise (times per week) 0 times per week   Current Exercises Include No Regular Exercise   Do you need help using the phone?  Yes   Are you deaf or do you have serious difficulty hearing?  Yes   Do you need help to go to places out of walking distance? Yes   Do you need help shopping? Yes   Do you need help preparing meals?  Yes   Do you need help with housework?  Yes   Do you need help with laundry? No   Do you need help taking your medications? No   Do you need help managing money? No   Do you ever drive or ride in a car without wearing a seat belt? No   Have you felt unusual stress, anger or loneliness in the last month? No   Who do  you live with? Spouse   If you need help, do you have trouble finding someone available to you? Yes   Have you been bothered in the last four weeks by sexual problems? No   Do you have difficulty concentrating, remembering or making decisions? No       Age-appropriate Screening Schedule:  Refer to the list below for future screening recommendations based on patient's age, sex and/or medical conditions. Orders for these recommended tests are listed in the plan section. The patient has been provided with a written plan.    Health Maintenance   Topic Date Due    TDAP/TD VACCINES (1 - Tdap) Never done    RSV Vaccine - Adults (1 - 1-dose 60+ series) Never done    ANNUAL WELLNESS VISIT  02/20/2024    LIPID PANEL  02/20/2024    DXA SCAN  04/25/2024 (Originally 3/16/2023)    INFLUENZA VACCINE  08/01/2024    BMI FOLLOWUP  04/25/2025    COVID-19 Vaccine  Completed    Pneumococcal Vaccine 65+  Completed    ZOSTER VACCINE  Completed                  CMS Preventative Services Quick Reference  Risk Factors Identified During Encounter  Depression/Dysphoria: Current medication is effective, no change recommended  The above risks/problems have been discussed with the patient.  Pertinent information has been shared with the patient in the After Visit Summary.  An After Visit Summary and PPPS were made available to the patient.    Follow Up:   Next Medicare Wellness visit to be scheduled in 1 year.       Additional E&M Note during same encounter follows:  Patient has multiple medical problems which are significant and separately identifiable that require additional work above and beyond the Medicare Wellness Visit.      Chief Complaint  Medicare Wellness-subsequent    Subjective        HPI  Annalee Melara is also being seen today for follow up.   History of Present Illness  The patient is an 83-year-old female here for Medicare wellness and follow-up on medical problems. She is here today with her .    The patient is  "experiencing sleep disturbances, characterized by difficulty initiating sleep, achieving a sleep duration of 1 to 6 hours per night. Despite this, she reports a slight improvement in her overall well-being following 6 hours of sleep. However, she expresses concern that her current medication, Lunesta and Quviviq, are ineffective. She is uncertain if she is concurrently taking both medications. She recalls that her insurance did not cover the cost of an unspecified medication. Once asleep, she manages to fall back asleep, albeit with a delay of 2 to 5 hours. She recalls a period of good sleep without medication approximately 5 years ago.    Supplemental Information  She is on Macrobid for her bladder issues and it is still working well. She is taking her blood pressure medication.            Objective   Vital Signs:  /74   Pulse 67   Temp 96.9 °F (36.1 °C) (Temporal)   Ht 160 cm (62.99\")   Wt 81.5 kg (179 lb 9.6 oz)   SpO2 97%   BMI 31.82 kg/m²     Physical Exam  Vitals and nursing note reviewed.   Constitutional:       General: She is not in acute distress.     Appearance: Normal appearance. She is well-developed. She is obese.   HENT:      Head: Normocephalic and atraumatic.      Right Ear: Tympanic membrane, ear canal and external ear normal.      Left Ear: Tympanic membrane, ear canal and external ear normal.      Nose: Nose normal.      Mouth/Throat:      Mouth: Mucous membranes are moist.      Pharynx: Oropharynx is clear. No oropharyngeal exudate or posterior oropharyngeal erythema.   Eyes:      Conjunctiva/sclera: Conjunctivae normal.      Pupils: Pupils are equal, round, and reactive to light.   Neck:      Thyroid: No thyromegaly.   Cardiovascular:      Rate and Rhythm: Normal rate and regular rhythm.      Heart sounds: No murmur heard.  Pulmonary:      Effort: Pulmonary effort is normal.      Breath sounds: Normal breath sounds. No wheezing.   Abdominal:      General: Abdomen is flat. Bowel " sounds are normal. There is no distension.      Palpations: Abdomen is soft. There is no mass.      Tenderness: There is no abdominal tenderness.      Hernia: No hernia is present.   Musculoskeletal:         General: No swelling. Normal range of motion.      Cervical back: Normal range of motion and neck supple.      Right lower leg: No edema.      Left lower leg: No edema.   Lymphadenopathy:      Cervical: No cervical adenopathy.   Skin:     General: Skin is warm and dry.      Capillary Refill: Capillary refill takes less than 2 seconds.      Coloration: Skin is pale.      Findings: No rash.   Neurological:      General: No focal deficit present.      Mental Status: She is alert and oriented to person, place, and time. Mental status is at baseline.      Cranial Nerves: No cranial nerve deficit.   Psychiatric:         Mood and Affect: Mood normal.         Behavior: Behavior normal.        Physical Exam                  Results            Assessment and Plan   Diagnoses and all orders for this visit:    1. Medicare annual wellness visit, subsequent (Primary)    2. Essential hypertension  -     CBC & Differential  -     Comprehensive Metabolic Panel  -     TSH    3. Chronic insomnia  -     Ambulatory Referral to Sleep Medicine    4. Chronic UTI    5. Prediabetes  -     Hemoglobin A1c    6. Lupus anticoagulant syndrome    7. Long term (current) use of anticoagulants    8. Depression, unspecified depression type      Assessment & Plan  1. Chronic insomnia.  A referral to a sleep specialist has been initiated.  Other medical problems are stable and we will check labs  Health maintenance is up-to-date.         Follow Up   Return in about 3 months (around 7/25/2024).  Patient was given instructions and counseling regarding her condition or for health maintenance advice. Please see specific information pulled into the AVS if appropriate.     Patient or patient representative verbalized consent for the use of Ambient  Listening during the visit with  Meredith Lea Kehrer, MD for chart documentation. 4/25/2024  15:08 EDT

## 2024-04-26 LAB
ALBUMIN SERPL-MCNC: 4.1 G/DL (ref 3.7–4.7)
ALBUMIN/GLOB SERPL: 1.7 {RATIO} (ref 1.2–2.2)
ALP SERPL-CCNC: 151 IU/L (ref 44–121)
ALT SERPL-CCNC: 13 IU/L (ref 0–32)
AST SERPL-CCNC: 17 IU/L (ref 0–40)
BASOPHILS # BLD AUTO: 0.1 X10E3/UL (ref 0–0.2)
BASOPHILS NFR BLD AUTO: 1 %
BILIRUB SERPL-MCNC: 0.4 MG/DL (ref 0–1.2)
BUN SERPL-MCNC: 15 MG/DL (ref 8–27)
BUN/CREAT SERPL: 16 (ref 12–28)
CALCIUM SERPL-MCNC: 9.3 MG/DL (ref 8.7–10.3)
CHLORIDE SERPL-SCNC: 100 MMOL/L (ref 96–106)
CO2 SERPL-SCNC: 20 MMOL/L (ref 20–29)
CREAT SERPL-MCNC: 0.91 MG/DL (ref 0.57–1)
EGFRCR SERPLBLD CKD-EPI 2021: 63 ML/MIN/1.73
EOSINOPHIL # BLD AUTO: 0.1 X10E3/UL (ref 0–0.4)
EOSINOPHIL NFR BLD AUTO: 1 %
ERYTHROCYTE [DISTWIDTH] IN BLOOD BY AUTOMATED COUNT: 16.9 % (ref 11.7–15.4)
GLOBULIN SER CALC-MCNC: 2.4 G/DL (ref 1.5–4.5)
GLUCOSE SERPL-MCNC: 107 MG/DL (ref 70–99)
HBA1C MFR BLD: 5.8 % (ref 4.8–5.6)
HCT VFR BLD AUTO: 35.8 % (ref 34–46.6)
HGB BLD-MCNC: 10.9 G/DL (ref 11.1–15.9)
IMM GRANULOCYTES # BLD AUTO: 0 X10E3/UL (ref 0–0.1)
IMM GRANULOCYTES NFR BLD AUTO: 0 %
LYMPHOCYTES # BLD AUTO: 1.7 X10E3/UL (ref 0.7–3.1)
LYMPHOCYTES NFR BLD AUTO: 18 %
MCH RBC QN AUTO: 24.2 PG (ref 26.6–33)
MCHC RBC AUTO-ENTMCNC: 30.4 G/DL (ref 31.5–35.7)
MCV RBC AUTO: 79 FL (ref 79–97)
MONOCYTES # BLD AUTO: 0.6 X10E3/UL (ref 0.1–0.9)
MONOCYTES NFR BLD AUTO: 7 %
NEUTROPHILS # BLD AUTO: 6.9 X10E3/UL (ref 1.4–7)
NEUTROPHILS NFR BLD AUTO: 73 %
PLATELET # BLD AUTO: 365 X10E3/UL (ref 150–450)
POTASSIUM SERPL-SCNC: 4.9 MMOL/L (ref 3.5–5.2)
PROT SERPL-MCNC: 6.5 G/DL (ref 6–8.5)
RBC # BLD AUTO: 4.51 X10E6/UL (ref 3.77–5.28)
SODIUM SERPL-SCNC: 135 MMOL/L (ref 134–144)
TSH SERPL DL<=0.005 MIU/L-ACNC: 1.02 UIU/ML (ref 0.45–4.5)
WBC # BLD AUTO: 9.4 X10E3/UL (ref 3.4–10.8)

## 2024-04-30 DIAGNOSIS — I10 ESSENTIAL HYPERTENSION: ICD-10-CM

## 2024-04-30 DIAGNOSIS — N39.0 CHRONIC UTI: ICD-10-CM

## 2024-04-30 RX ORDER — NITROFURANTOIN 25; 75 MG/1; MG/1
100 CAPSULE ORAL DAILY
Qty: 30 CAPSULE | Refills: 0 | Status: SHIPPED | OUTPATIENT
Start: 2024-04-30

## 2024-04-30 RX ORDER — LISINOPRIL 10 MG/1
10 TABLET ORAL DAILY
Qty: 90 TABLET | Refills: 0 | Status: SHIPPED | OUTPATIENT
Start: 2024-04-30

## 2024-05-03 DIAGNOSIS — F51.04 CHRONIC INSOMNIA: ICD-10-CM

## 2024-05-03 RX ORDER — ESZOPICLONE 3 MG/1
TABLET, FILM COATED ORAL
Qty: 30 TABLET | Refills: 0 | Status: SHIPPED | OUTPATIENT
Start: 2024-05-03

## 2024-05-06 ENCOUNTER — TELEPHONE (OUTPATIENT)
Dept: FAMILY MEDICINE CLINIC | Facility: CLINIC | Age: 84
End: 2024-05-06

## 2024-05-06 NOTE — TELEPHONE ENCOUNTER
Caller: Annalee Melara    Relationship: Self    Best call back number:     What is the best time to reach you:     Who are you requesting to speak with (clinical staff, provider,  specific staff member): DR KEHRER OR STAFF    Do you know the name of the person who called:     What was the call regarding: PATIENT IS CALLING IN TO GET THE NAME OF THE SLEEP  THAT SHE WAS REFERRED TO. SHE WANTS TO BE CALLED BACK TO DISCUSS.     Is it okay if the provider responds through MyChart:

## 2024-05-31 DIAGNOSIS — N39.0 CHRONIC UTI: ICD-10-CM

## 2024-05-31 RX ORDER — NITROFURANTOIN 25; 75 MG/1; MG/1
100 CAPSULE ORAL DAILY
Qty: 30 CAPSULE | Refills: 0 | Status: SHIPPED | OUTPATIENT
Start: 2024-05-31

## 2024-06-03 RX ORDER — OMEPRAZOLE 40 MG/1
CAPSULE, DELAYED RELEASE ORAL
Qty: 90 CAPSULE | Refills: 0 | Status: SHIPPED | OUTPATIENT
Start: 2024-06-03

## 2024-06-06 ENCOUNTER — OFFICE VISIT (OUTPATIENT)
Dept: SLEEP MEDICINE | Facility: HOSPITAL | Age: 84
End: 2024-06-06
Payer: MEDICARE

## 2024-06-06 VITALS
SYSTOLIC BLOOD PRESSURE: 127 MMHG | WEIGHT: 178 LBS | OXYGEN SATURATION: 96 % | BODY MASS INDEX: 31.54 KG/M2 | HEIGHT: 63 IN | DIASTOLIC BLOOD PRESSURE: 74 MMHG | HEART RATE: 72 BPM

## 2024-06-06 DIAGNOSIS — J44.9 CHRONIC OBSTRUCTIVE PULMONARY DISEASE, UNSPECIFIED COPD TYPE: ICD-10-CM

## 2024-06-06 DIAGNOSIS — Z72.821 INADEQUATE SLEEP HYGIENE: ICD-10-CM

## 2024-06-06 DIAGNOSIS — F51.04 CHRONIC INSOMNIA: ICD-10-CM

## 2024-06-06 DIAGNOSIS — F03.90 DEMENTIA, UNSPECIFIED DEMENTIA SEVERITY, UNSPECIFIED DEMENTIA TYPE, UNSPECIFIED WHETHER BEHAVIORAL, PSYCHOTIC, OR MOOD DISTURBANCE OR ANXIETY: ICD-10-CM

## 2024-06-06 DIAGNOSIS — R06.81 WITNESSED EPISODE OF APNEA: ICD-10-CM

## 2024-06-06 DIAGNOSIS — N39.0 CHRONIC UTI: ICD-10-CM

## 2024-06-06 DIAGNOSIS — R06.83 SNORING: ICD-10-CM

## 2024-06-06 DIAGNOSIS — R29.818 SUSPECTED SLEEP APNEA: Primary | ICD-10-CM

## 2024-06-06 DIAGNOSIS — E66.9 CLASS 1 OBESITY WITH SERIOUS COMORBIDITY AND BODY MASS INDEX (BMI) OF 31.0 TO 31.9 IN ADULT, UNSPECIFIED OBESITY TYPE: ICD-10-CM

## 2024-06-06 DIAGNOSIS — G47.19 EXCESSIVE DAYTIME SLEEPINESS: ICD-10-CM

## 2024-06-06 DIAGNOSIS — F32.A DEPRESSION, UNSPECIFIED DEPRESSION TYPE: ICD-10-CM

## 2024-06-06 PROCEDURE — G0463 HOSPITAL OUTPT CLINIC VISIT: HCPCS

## 2024-06-06 NOTE — PROGRESS NOTES
[facility]  [address]  [address]  [place] [address]  Phone   Fax       Annalee Melara  [phone]   1940  83 y.o.  female      Referring physician/provider and  PCP Kehrer, Meredith Lea, MD    Type of service: Initial Sleep Medicine Consult.  Date of service: 6/6/2024      Chief Complaint   Patient presents with    Sleeping Problem         Historians in today's encounter: Patient and Hussain (), Chanel (Daughter)  Prior to the onset of the encounter I made sure that the patient provided verbal consent to me to discuss all of the patient's medical information to include any sensitive information/topics in front of the above noted individual also in the room. The patient insisted the above individual stay in the room for entire the encounter to completion.     History of present illness;  The patient was seen today on 6/6/2024 at [facility].    Thank you for asking to see Annalee Lopezrebecca, 83 y.o. 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  The patient presents for initial evaluation of multiple sleep problems to include loud snoring, witnessed apneas, chronic insomnia diagnosed by her PCP.  Patient  denies prior surgery namely tonsillectomy, nasal surgery or UPPP.       Loud snoring noted by her  who has sleep apnea   Also witnessed apneas by   Patient has NEVER had a sleep study  She endorses subjective excessive daytime sleepiness discordant with her pre-encounter self administered epworth  Obstructive Sleep Apnea Screening: STOP-BANG Sleep Apnea Questionnaire. Reference: Fernando F et al. Br J Anaesth, 2012.      Criterion    Yes    No  Do you SNORE loudly?   [x]   Yes  []   No   Do you often feel TIRED, fatigued, or sleepy during the day?    [x]   Yes  []   No  Has anyone OBSERVED you stop breathing during your sleep?    [x]   Yes  []   No  Do you have or are you being treated for high blood PRESSURE?    [x]   Yes  []   No  BMI >32 kg/m2     [x]   Yes  []   No  AGE > 50 years    [x]   Yes  []   No  NECK circumference >16 inches / 40 cm    []   Yes  [x]   No  GENDER: male     []   Yes  [x]   No    DAVID Probability:  []   1-2 - Low  []   3-4 - Intermediate  [x]   5-8 - High      She has a history of PE  States she's been diagnosed with COPD   Denies following with pulmonary   She can't tell me if she's had PFT(s) before she states multiple physicians have told her she has COPD (she denies care with pulmonary at present)  Follows with neurology who is treating her for dementia     Other than extensive history documented in HPI she denies any other past cardiopulmonary conditions/neurologic disorders/neuromuscular disorders  Never needed supplemental O2 at home  Denies any opioid therapy  Denies any metal in head/neck/chest      Sleep Disturbance History    Difficulty initiating sleep - yes  Onset 6 months  Frequency: 7 nights     Difficulty Maintaining sleep - yes  Frequency: 1-2x/week  Reasons: Nocturia/dysuria/urgency - states she had her bladder lacerated after an unspecified surgery and has seen many urologists in the past for these issues     Sleep History    Bedtime: midnight to 2 am  Rise time:  10 am to 12 pm  Sleep Latency: 2- 4 hours  Sleep aids: Lunesta 3 mg a night   Awakenings after sleep onset (WASO): 3x  Reasons for WASO: nocturia  Time to return to sleep:   right away           Extrinsic Issues (a diagnosis of insomnia  cannot be exclusively due to inadequate opportunity or circumstances for sleep):     I.Adequate opportunity -  Yes    II. circumstances for sleep-  Noise - denies  Temperature - denies too  "hot or too cold  Light -  denies excessive light   Bedding - states she sleeps in an adjustable recliner chair  -  angled at 45 degrees - denies orthopnea  Bed Partner -  Denies   Caffeine -  multiple tea / coke / coffee last by 8 pm she mistakenly believes that decaffeinated beverages have no caffeine   Alcohol - denies  Substance use - denies  Screens in bed -  yes phone when she can't sleep she reads news she gets upset by some times the news she states especially when she reads about [place]  Daytime napping - denies  Daytime use of bed for activities other than sleep - yes uses same recliner in the day time is it in most of the day  patient unable to identify other extrinsic causes: dysuria, urinary frequency, urgency (states she has seen many urologist in the past)       Intrinsic disturbances endorsed to contribute to sleep initiation or sleep maintenance by patient:   Pain - \"when I get the urinary tract infections\"  GERD -  Denies  Depression -  PHQ +2/2 she endorses her depression as not well controlled states currently managed by her PCP with Cymbalta she states cymbalta is not working well she does take it qam Denies si/hi. She states her PCP has told her if her depression remains not controlled she may have to see a psychologist. She has never seen psychiatry  Anxiety - No  Rumination - Yes when I lay there long enough I go through some bad thoughts about things that have happened in the past   Sleep disordered breathing -  never had sleep study  RLS - denies      RLS Symptoms: No   Bruxism:No   Current sleep related gastroesophageal reflux symptoms:  No   Cataplexy:  No   Sleep Paralysis:  No   Hypnagogic or hypnopompic hallucinations: No   Parasomnias such as sleep walking or sleep eating No     Disclaimer Sleep History: The above sleep history is based on this sleep physician's in room encounter with the patient. Pre encounter self administered questionnaires are taken into consideration and discussed " "with patient for any discordance. The above documentation by this sleep physician is the most accurate clinical information determined by in room sleep physician encounter with patient.     MEDICAL CONDITIONS (PMH)   HTN  Depression  COPD  Lupus anticoagulant syndrome /PE  Recurrent UTI(s)  Chronic insomnia  History of falls w/ left hip fracture  Normocytic anemia    Social history:  Do you drive a commercial vehicle:  No   Shift work:  No  Tobacco use:  No   Alcohol use:  per week  Occupation: not working    Family Hx (parents and siblings) (pertaining to sleep medicine)  Patient unable to provide    Medications: reviewed    Review of systems is negative unless otherwise noted per HPI   Disclaimer History: The above history is based on this sleep physician's in room encounter with the patient. Pre encounter self administered questionnaires are taken into consideration and discussed with patient for any discordance. The above documentation by this sleep physician is the most accurate clinical information determined by in room sleep physician encounter with patient.     Physical exam:  Vitals:    06/06/24 1300   BP: 127/74   Pulse: 72   SpO2: 96%   Weight: 80.7 kg (178 lb)   Height: 160 cm (63\")    Body mass index is 31.53 kg/m².   CONSTITUTIONAL:  Non-toxic, In no overt distress   Head: normocephalic   ENT: Mallampati class IV, + macroglossia, no septal defects   NECK:Neck Circumference: 14.5 inches,no nuchal rigidity  RESPIRATORY SYSTEM: Breath sounds are clear (no rales, no rhonchi, no wheezes), no accessory muscle use  CARDIOVASULAR SYSTEM: Heart sounds are regular rhythm and normal rate, no rub, no gallop, no edema  NEUROLOGICAL SYSTEM: Oriented x 3, No gross focal deficits   PSYCHIATRIC SYSTEM: She is able to provide a good history and follow direction, affect is intermittently anxious       Office notes from care team reviewed:    -4/25/2024 office visit PCP Dr. Meredith Kehrer BMI this visit 31.82 kg/m² " patient is on a DOAC, no sleep study, history of falls is documented in her medical history, prescriebd Lunesta and other centrally acting hypnotic per PDMP reviewed since 6/10/2023 by primary care     Labs reviewed.  TSH          4/25/2024    15:49   TSH   TSH 1.020       Most Recent A1C          4/25/2024    15:49   HGBA1C Most Recent   Hemoglobin A1C 5.8      4/25/2024  Bicarb 20    Imaging/Diagnostics reviewed:      - 6/12/2019 TTE echocardiogram cardiologist's findings: EF greater than 55% see full report for further details    Assessment and plan:  Suspected sleep apnea [R29.818] patient's symptoms and examination is consistent with sleep apnea (G47.30). I have talked to the patient about the signs and symptoms of sleep apnea. In addition, I have also discussed pathophysiology of sleep apnea.  I also discussed the complications of untreated sleep apnea including effects on hypertension, diabetes mellitus and nonrestorative sleep with hypersomnia which can increase risk for motor vehicle accidents.  Untreated sleep apnea is also a risk factor for development of atrial fibrillation, hypertension, insulin resistance and cerebrovascular accident.  Discussed in detail of various testing methods including home-based and lab based sleep studies.  Based on history and physical examination and other comorbidities the most appropriate study is to order SPLIT AHI > 15 in laboratory polysomnography, rather than home sleep apnea testing,to rule out diagnosis of sleep apnea per AASM clinical practice guidelines (doi:[email or web address]/jcsm.6506) secondary to patient’s history of: most certainly at least moderate severity sleep apnea, history of chronic insomnia, dementia under care of neurology, COPD/ history of PE. All questions answered:   Snoring (R06.83), snoring is the sound created by turbulent airflow vibrating upper airway soft tissue due to limitation of inspiratory airflow. I have also discussed factors affecting snoring  including sleep deprivation, sleeping on the back and alcohol ingestion. To minimize snoring, patient is advised to have adequate sleep, sleep on the side and avoid alcohol and sedative medications before bedtime  Excessive daytime sleepiness .  Patient endorses subjective excessive daytime sleepiness with sleep physician encounter which was inconsistent with patient's pre-encounter self-administered [place] Sleepiness Scale of Total score: 4.  There are many causes for daytime excessive sleepiness including depression, shiftwork syndrome, and other medical disorders including heart, kidney and liver failure.  From sleep disorders perspective this is sleep disordered breathing until proven otherwise. The most common cause of excessive sleepiness is due to sleep apnea with frequent awakenings during sleep time.  I have discussed safety of driving and to remain vigilant while driving; patient verbalized understanding of counseling.  Obesity, patient's BMI is Body mass index is 31.53 kg/m².. I have discussed the relationship between weight and sleep apnea.There is direct correlation between weight and severity of sleep apnea.  Weight reduction is encouraged, as it is going to reduce the severity of sleep apnea. I have also discussed with the patient diet and exercise to achieve ideal body weight.  Insomnia (Chronic)  -Must treat underlying cause of insomnia, contributing factors in today's sleep clinic visit to include:   #1 must rule out sleep disordered breathing high pretest probability  #2 multiple inadequate circumstances of sleep borderline exclusion of diagnosis of insomnia (I provided extensive stimulus control therapy in room, and gave her in writing direction) She must follow up with [facility] should sleep study not reveal any sleep disordered breathing - referred to [facility] see below   #3  Depression - not well controlled.  I would prefer she be on a sedating antidepressant with her medical history. I will not allow  sleep medicine to guide sedating antidepressant in a patient with depression. Counseled to follow up with PCP to discuss psychiatry referral  #4Chronic UTI(s) contributing - follow up with PCP/Urology    -Stimulus control counseling is AASM Standard Recommendation regarding insomnia: counseling initiated to help establish and strength positive association between falling asleep and bed and bedtime routines. The following instructions were emphasized:  Maintain regular sleep-wake schedule  Avoiding napping   Use bed only for nocturnal sleep or intimacy   Attempt to fall asleep ONLY when sleepy   If unable to fall asleep within 20 minutes leave the bed and pursue a relaxing boring activity in a dark or dimly lit area, then return to bed only when sleepy (This STEP MUST BE REPEATED as often as necessary with emphasis to only attempt to fall asleep in bed when sleepy)   --Referred patient to psychology for CBT-I. Encouraged patient to do research and contact their personal insurance for a sleep psychologist. I provided patient with the following sleep psychologist's information for [facility] who also provides telehealth services: Dr. Gaetano Guzman's ([facility], [facility])   [employer]  Address: [address], [place] [address]  Phone: [phone]  -Hypnotic agents that have been started will be per her prescribing physician. I did inform patient I would want her to wean off the hypnotic to be guided by her prescribing physician should sleep disordered breathing be identified/treated or ruled out. She has multiple high risk factors that make centrally acting hypnotics a very poor choice to include: foremost must rule out sleep disordered breathing; I made her aware a pill to sedate her when her brain is trying to save her life by causing arousals can lead to very serious outcomes of permanent disability and or death with untreated sleep disordered breathing, geriatric female, history of falls - history of risk  factors - on blood thinner - history of dementia: these are all reasons to avoid any centrally acting hypnotics for this patient's specific clinical circumstances. For as long as the patient has continued follow ups with me: I WILL NOT allow ANY covering sleep physician to prescribe hypnotic therapy for this very high risk patient as long as she has continued follow ups with me. I will identify if any inappropriate treatments started by sleep physicians as long as she has continued follow ups with me.  -She may take her Lunesta on the night of the sleep study ONLY after hook up by sleep technician for PSG and after she is seated on side of the bed. I performed verbal teach back for her for same.   Depression,  Follow up with PCP to discuss psychiatry referral. Again I will not allow sleep medicine to prescribe antidepressants without input from psychiatry for uncontrolled depression as long as the patient has continued follow ups with me. Doxpein may be a better choice. Counseled patient to follow up with PCP to discuss psychiatry referral. If sleep study does not reveal sleep disordered breathing plan as stated above.  Chronic UTI(s), contributing to sleep disturbance. Follow up with PCP/Urology.  Dementia, in lab sleep study. Follow up with neurology as previous  COPD,  in lab sleep study as stated above. Follow up with PCP to discuss establishing care with pulmonary.    I have also discussed with the patient the following  Sleep hygiene: Maintaining a regular bedtime and wake time, not to watch television or work in bed, limit caffeine-containing beverages before bed time and avoid naps during the day  Adequate amount of sleep.  Generally most people needs about 7 to 8 hours of sleep.      Return for 31 to 90 days after PAP setup with down load.  Patient's questions were answered      I once again thank you for asking me to see this patient in consultation and I have forwarded my opinion and treatment plan.   Please do not hesitate to call me if you have any questions.       Time based visit 75 minutes: to include in room history/exam/extensive counseling/review of plan with patient/review of labs/review of diagnostics/independent/ review of prior medical records/ PDMP reviewed /complex medical decision making leading to order for in lab split polysomnography/ referred to [facility]      EMR Dragon/Transcription disclaimer:   Much of this encounter note is an electronic transcription/translation of spoken language to printed text. The electronic translation of spoken language may permit erroneous, or at times, nonsensical words or phrases to be inadvertently transcribed; Although I have reviewed the note for such errors, some may still exist.     NPI #: [id number]    Radha Arroyo, DO  Sleep Medicine  [facility]  06/06/24

## 2024-06-06 NOTE — PATIENT INSTRUCTIONS
We provided you with a referral for psychology for CBT-I. You are encouraged to do research and contact your personal insurance for a sleep psychologist who will best take your insurance. We are providing you with the following sleep psychologist's information for [facility] who also provides telehealth services: Dr. Gaetano Guzman's ([facility], [facility])   [employer]  Address: [address] [place], [place] [address]  Phone: [phone]

## 2024-06-07 DIAGNOSIS — F51.04 CHRONIC INSOMNIA: ICD-10-CM

## 2024-06-07 RX ORDER — ESZOPICLONE 3 MG/1
TABLET, FILM COATED ORAL
Qty: 30 TABLET | Refills: 0 | Status: SHIPPED | OUTPATIENT
Start: 2024-06-07

## 2024-06-14 DIAGNOSIS — F32.A DEPRESSION, UNSPECIFIED DEPRESSION TYPE: ICD-10-CM

## 2024-06-14 RX ORDER — DULOXETIN HYDROCHLORIDE 60 MG/1
60 CAPSULE, DELAYED RELEASE ORAL DAILY
Qty: 90 CAPSULE | Refills: 0 | Status: SHIPPED | OUTPATIENT
Start: 2024-06-14

## 2024-06-15 ENCOUNTER — TELEPHONE (OUTPATIENT)
Dept: URGENT CARE | Facility: CLINIC | Age: 84
End: 2024-06-15
Payer: MEDICARE

## 2024-06-15 NOTE — TELEPHONE ENCOUNTER
Called pt. She answered and confirmed her date of birth, relayed test results, Klebsiella pneumoniae infection, pt. Reports feeling better on Ciprofloxacin antibiotic. Recommended completing antibiotic medication as prescribed. She reports no further questions at this time, recommended she call us back if she has any. Patient expressed understanding and agreement to all of the above.     -John Cooksey, PA-C

## 2024-06-23 DIAGNOSIS — I26.99 PULMONARY EMBOLISM, UNSPECIFIED CHRONICITY, UNSPECIFIED PULMONARY EMBOLISM TYPE, UNSPECIFIED WHETHER ACUTE COR PULMONALE PRESENT: ICD-10-CM

## 2024-06-24 RX ORDER — RIVAROXABAN 20 MG/1
TABLET, FILM COATED ORAL
Qty: 90 TABLET | Refills: 0 | Status: SHIPPED | OUTPATIENT
Start: 2024-06-24

## 2024-07-03 ENCOUNTER — HOSPITAL ENCOUNTER (OUTPATIENT)
Dept: SLEEP MEDICINE | Facility: HOSPITAL | Age: 84
Discharge: HOME | End: 2024-07-03
Admitting: FAMILY MEDICINE
Payer: MEDICARE

## 2024-07-03 DIAGNOSIS — N39.0 CHRONIC UTI: ICD-10-CM

## 2024-07-03 DIAGNOSIS — R06.83 SNORING: ICD-10-CM

## 2024-07-03 DIAGNOSIS — J44.9 CHRONIC OBSTRUCTIVE PULMONARY DISEASE, UNSPECIFIED COPD TYPE: ICD-10-CM

## 2024-07-03 DIAGNOSIS — F03.90 DEMENTIA, UNSPECIFIED DEMENTIA SEVERITY, UNSPECIFIED DEMENTIA TYPE, UNSPECIFIED WHETHER BEHAVIORAL, PSYCHOTIC, OR MOOD DISTURBANCE OR ANXIETY: ICD-10-CM

## 2024-07-03 DIAGNOSIS — F51.04 CHRONIC INSOMNIA: ICD-10-CM

## 2024-07-03 DIAGNOSIS — G47.19 EXCESSIVE DAYTIME SLEEPINESS: ICD-10-CM

## 2024-07-03 DIAGNOSIS — R29.818 SUSPECTED SLEEP APNEA: ICD-10-CM

## 2024-07-03 DIAGNOSIS — R06.81 WITNESSED EPISODE OF APNEA: ICD-10-CM

## 2024-07-03 DIAGNOSIS — F32.A DEPRESSION, UNSPECIFIED DEPRESSION TYPE: ICD-10-CM

## 2024-07-03 PROCEDURE — 95810 POLYSOM 6/> YRS 4/> PARAM: CPT

## 2024-07-03 RX ORDER — NITROFURANTOIN 25; 75 MG/1; MG/1
100 CAPSULE ORAL DAILY
Qty: 30 CAPSULE | Refills: 0 | Status: SHIPPED | OUTPATIENT
Start: 2024-07-03

## 2024-07-03 RX ORDER — DULOXETIN HYDROCHLORIDE 60 MG/1
60 CAPSULE, DELAYED RELEASE ORAL DAILY
Qty: 90 CAPSULE | Refills: 0 | Status: SHIPPED | OUTPATIENT
Start: 2024-07-03

## 2024-07-08 DIAGNOSIS — F51.04 CHRONIC INSOMNIA: ICD-10-CM

## 2024-07-08 RX ORDER — ESZOPICLONE 3 MG/1
TABLET, FILM COATED ORAL
Qty: 30 TABLET | Refills: 0 | OUTPATIENT
Start: 2024-07-08

## 2024-07-10 DIAGNOSIS — F51.04 CHRONIC INSOMNIA: ICD-10-CM

## 2024-07-10 RX ORDER — ESZOPICLONE 3 MG/1
3 TABLET, FILM COATED ORAL NIGHTLY
Qty: 30 TABLET | Refills: 0 | OUTPATIENT
Start: 2024-07-10 | End: 2024-08-09

## 2024-07-11 ENCOUNTER — TELEPHONE (OUTPATIENT)
Dept: FAMILY MEDICINE CLINIC | Facility: CLINIC | Age: 84
End: 2024-07-11

## 2024-07-11 DIAGNOSIS — F51.04 CHRONIC INSOMNIA: Primary | ICD-10-CM

## 2024-07-11 RX ORDER — ESZOPICLONE 2 MG/1
2 TABLET, FILM COATED ORAL NIGHTLY
Qty: 15 TABLET | Refills: 0 | Status: SHIPPED | OUTPATIENT
Start: 2024-07-11 | End: 2024-07-25

## 2024-07-11 NOTE — TELEPHONE ENCOUNTER
Sleep therapy should be managing this now.  It was not working for her anyway.  Please see sleep referral note.  I would be glad to write her 2 weeks of the 2 mg dose and then 1 week of the 1 mg dose if she feels she needs to wean off of it.  They also mentioned to her that she needs to see a psychiatrist.  If she is ready for me to set up the referral, I will do it.

## 2024-07-11 NOTE — TELEPHONE ENCOUNTER
Caller: Annalee Melara    Relationship: Self    Best call back number: [phone]    What is the best time to reach you: ANYTIME     Who are you requesting to speak with (clinical staff, provider,  specific staff member): CLINICAL     What was the call regarding: PATIENT WOULD LIKE TO KNOW WHY HER ESZOPICLONE PRESCRIPTION IS NOT BEING FILLED.     PLEASE ADVISE

## 2024-07-15 DIAGNOSIS — F32.A DEPRESSION, UNSPECIFIED DEPRESSION TYPE: ICD-10-CM

## 2024-07-15 DIAGNOSIS — G47.33 OBSTRUCTIVE SLEEP APNEA, ADULT: Primary | ICD-10-CM

## 2024-07-23 ENCOUNTER — DOCUMENTATION (OUTPATIENT)
Dept: SLEEP MEDICINE | Facility: HOSPITAL | Age: 84
End: 2024-07-23
Payer: MEDICARE

## 2024-07-23 ENCOUNTER — TELEPHONE (OUTPATIENT)
Dept: SLEEP MEDICINE | Facility: HOSPITAL | Age: 84
End: 2024-07-23
Payer: MEDICARE

## 2024-07-23 NOTE — PROGRESS NOTES
Date 7/23/2024    Patient was called by sleep staff with my interpretation recommendations.    Asked to review sleep study results over the phone.    Absolutely not appropriate to review or discuss polysomnography results/questions on the phone with the sleep physician.     Instructed sleep staff to offer patient sooner in clinic follow up for results review and or to answer any questions she has.      NPI #: [id number]    Radha Arroyo, DO  Sleep Medicine  [facility]  07/23/24

## 2024-07-23 NOTE — TELEPHONE ENCOUNTER
Spoke with patient about test results. Patient was requesting if the provide could call, advised patient I could send a message. Unsure what provide will do. Patient understood. Sent Dr. Arroyo EPIC message.

## 2024-07-25 ENCOUNTER — TELEPHONE (OUTPATIENT)
Dept: SLEEP MEDICINE | Facility: HOSPITAL | Age: 84
End: 2024-07-25
Payer: MEDICARE

## 2024-07-25 ENCOUNTER — OFFICE VISIT (OUTPATIENT)
Dept: FAMILY MEDICINE CLINIC | Facility: CLINIC | Age: 84
End: 2024-07-25
Payer: MEDICARE

## 2024-07-25 VITALS
HEIGHT: 63 IN | TEMPERATURE: 98.7 F | HEART RATE: 76 BPM | OXYGEN SATURATION: 98 % | SYSTOLIC BLOOD PRESSURE: 122 MMHG | WEIGHT: 187.6 LBS | DIASTOLIC BLOOD PRESSURE: 80 MMHG | BODY MASS INDEX: 33.24 KG/M2

## 2024-07-25 DIAGNOSIS — I10 ESSENTIAL HYPERTENSION: ICD-10-CM

## 2024-07-25 DIAGNOSIS — N39.0 CHRONIC UTI: ICD-10-CM

## 2024-07-25 DIAGNOSIS — D64.9 ANEMIA, UNSPECIFIED TYPE: ICD-10-CM

## 2024-07-25 DIAGNOSIS — F32.A DEPRESSION, UNSPECIFIED DEPRESSION TYPE: ICD-10-CM

## 2024-07-25 DIAGNOSIS — R73.03 PREDIABETES: ICD-10-CM

## 2024-07-25 DIAGNOSIS — F51.04 CHRONIC INSOMNIA: ICD-10-CM

## 2024-07-25 DIAGNOSIS — I10 ESSENTIAL HYPERTENSION: Primary | ICD-10-CM

## 2024-07-25 LAB
BILIRUB BLD-MCNC: ABNORMAL MG/DL
CLARITY, POC: CLEAR
COLOR UR: YELLOW
EXPIRATION DATE: ABNORMAL
GLUCOSE UR STRIP-MCNC: NEGATIVE MG/DL
KETONES UR QL: NEGATIVE
LEUKOCYTE EST, POC: ABNORMAL
Lab: ABNORMAL
NITRITE UR-MCNC: NEGATIVE MG/ML
PH UR: 6 [PH] (ref 5–8)
PROT UR STRIP-MCNC: ABNORMAL MG/DL
RBC # UR STRIP: NEGATIVE /UL
SP GR UR: 1.02 (ref 1–1.03)
UROBILINOGEN UR QL: NORMAL

## 2024-07-25 PROCEDURE — 1126F AMNT PAIN NOTED NONE PRSNT: CPT | Performed by: FAMILY MEDICINE

## 2024-07-25 PROCEDURE — 99214 OFFICE O/P EST MOD 30 MIN: CPT | Performed by: FAMILY MEDICINE

## 2024-07-25 PROCEDURE — 3074F SYST BP LT 130 MM HG: CPT | Performed by: FAMILY MEDICINE

## 2024-07-25 PROCEDURE — 3079F DIAST BP 80-89 MM HG: CPT | Performed by: FAMILY MEDICINE

## 2024-07-25 PROCEDURE — 81003 URINALYSIS AUTO W/O SCOPE: CPT | Performed by: FAMILY MEDICINE

## 2024-07-25 RX ORDER — LISINOPRIL 10 MG/1
10 TABLET ORAL DAILY
Qty: 90 TABLET | Refills: 0 | Status: SHIPPED | OUTPATIENT
Start: 2024-07-25

## 2024-07-25 NOTE — PROGRESS NOTES
"Chief Complaint  Med Refill, Insomnia, Hypertension, Hyperlipidemia, and Depression    Subjective        Annalee Melara presents to [facility]  History of Present Illness  History of Present Illness  The patient is an 83-year-old female who presents for routine follow-up. She is accompanied by her .    Since her last visit, she reports no significant changes in her health. She has not yet followed up with her sleep medicine specialist. Despite contacting them, she has not received any communication from them. She also spoke with a technician but has not received any communication from her doctor. She prefers not to use a CPAP machine as she feels well after sleep and feels well afterwards. She has completed her course of Lunesta 2 mg.    She experienced a bladder pain last night, which has persisted intermittently. Last month, she visited urgent care due to bacterial growth. She is adhering to her prescribed medication regimen. She denies experiencing any chest pain or shortness of breath.       Objective   Vital Signs:  /80   Pulse 76   Temp 98.7 °F (37.1 °C)   Ht 160 cm (63\")   Wt 85.1 kg (187 lb 9.6 oz)   SpO2 98%   BMI 33.23 kg/m²   Estimated body mass index is 33.23 kg/m² as calculated from the following:    Height as of this encounter: 160 cm (63\").    Weight as of this encounter: 85.1 kg (187 lb 9.6 oz).               Physical Exam  Constitutional:       General: She is not in acute distress.     Appearance: Normal appearance. She is well-developed. She is obese.   HENT:      Head: Normocephalic and atraumatic.      Right Ear: Tympanic membrane, ear canal and external ear normal.      Left Ear: Tympanic membrane, ear canal and external ear normal.      Mouth/Throat:      Mouth: Mucous membranes are moist.      Pharynx: Oropharynx is clear.   Eyes:      Conjunctiva/sclera: Conjunctivae normal.      Pupils: Pupils are equal, round, and reactive to light. "   Neck:      Thyroid: No thyromegaly.   Cardiovascular:      Rate and Rhythm: Normal rate and regular rhythm.      Heart sounds: No murmur heard.  Pulmonary:      Effort: Pulmonary effort is normal.      Breath sounds: Normal breath sounds. No wheezing.   Abdominal:      Palpations: Abdomen is soft.   Musculoskeletal:         General: Normal range of motion.      Cervical back: Neck supple.   Lymphadenopathy:      Cervical: No cervical adenopathy.   Skin:     General: Skin is warm and dry.   Neurological:      Mental Status: She is alert and oriented to person, place, and time.   Psychiatric:         Mood and Affect: Mood normal.         Behavior: Behavior normal.        Physical Exam  Vital Signs  Vitals show a blood pressure of 122/80.       Result Review :          Results  Laboratory Studies  A1c was 5.8 a few months ago. Anemia was stable.              Assessment and Plan     Diagnoses and all orders for this visit:    1. Essential hypertension (Primary)  -     CBC & Differential  -     Basic Metabolic Panel    2. Chronic insomnia    3. Chronic UTI  -     Urine Culture - Urine, Urine, Clean Catch  -     POCT urinalysis dipstick, automated    4. Depression, unspecified depression type    5. Prediabetes  -     Hemoglobin A1c    6. Anemia, unspecified type  -     CBC & Differential      Assessment & Plan  1. Sleep apnea.  A follow-up appointment with the sleep medicine specialist is recommended to discuss the test results.    2. Chronic urinary tract infection.  A urine culture will be ordered.    3. Prediabetes.  Her A1c was 5.8 a few months ago, indicating prediabetes.    4. Anemia.  Her anemia is stable. A recheck of her anemia will be conducted.            Follow Up     Return in about 3 months (around 10/25/2024) for Recheck.  Patient was given instructions and counseling regarding her condition or for health maintenance advice. Please see specific information pulled into the AVS if appropriate.    Patient or  patient representative verbalized consent for the use of Ambient Listening during the visit with  Meredith Lea Kehrer, MD for chart documentation. 7/29/2024  14:54 EDT

## 2024-07-26 DIAGNOSIS — J40 BRONCHITIS: ICD-10-CM

## 2024-07-26 DIAGNOSIS — J44.1 COPD WITH EXACERBATION: ICD-10-CM

## 2024-07-26 RX ORDER — BENZONATATE 200 MG/1
200 CAPSULE ORAL 3 TIMES DAILY PRN
Qty: 30 CAPSULE | Refills: 0 | OUTPATIENT
Start: 2024-07-26

## 2024-07-31 DIAGNOSIS — N30.90 KLEBSIELLA CYSTITIS: Primary | ICD-10-CM

## 2024-07-31 DIAGNOSIS — B96.1 KLEBSIELLA CYSTITIS: Primary | ICD-10-CM

## 2024-07-31 LAB
BACTERIA UR CULT: ABNORMAL
BACTERIA UR CULT: ABNORMAL
BASOPHILS # BLD AUTO: 0.1 X10E3/UL (ref 0–0.2)
BASOPHILS NFR BLD AUTO: 1 %
BUN SERPL-MCNC: 15 MG/DL (ref 8–27)
BUN/CREAT SERPL: 15 (ref 12–28)
CALCIUM SERPL-MCNC: 9.7 MG/DL (ref 8.7–10.3)
CHLORIDE SERPL-SCNC: 103 MMOL/L (ref 96–106)
CO2 SERPL-SCNC: 21 MMOL/L (ref 20–29)
CREAT SERPL-MCNC: 0.97 MG/DL (ref 0.57–1)
EGFRCR SERPLBLD CKD-EPI 2021: 58 ML/MIN/1.73
EOSINOPHIL # BLD AUTO: 0.2 X10E3/UL (ref 0–0.4)
EOSINOPHIL NFR BLD AUTO: 3 %
ERYTHROCYTE [DISTWIDTH] IN BLOOD BY AUTOMATED COUNT: 15.2 % (ref 11.7–15.4)
GLUCOSE SERPL-MCNC: 114 MG/DL (ref 70–99)
HBA1C MFR BLD: 6 % (ref 4.8–5.6)
HCT VFR BLD AUTO: 38.2 % (ref 34–46.6)
HGB BLD-MCNC: 11.4 G/DL (ref 11.1–15.9)
IMM GRANULOCYTES # BLD AUTO: 0 X10E3/UL (ref 0–0.1)
IMM GRANULOCYTES NFR BLD AUTO: 1 %
LYMPHOCYTES # BLD AUTO: 1.8 X10E3/UL (ref 0.7–3.1)
LYMPHOCYTES NFR BLD AUTO: 20 %
MCH RBC QN AUTO: 24.3 PG (ref 26.6–33)
MCHC RBC AUTO-ENTMCNC: 29.8 G/DL (ref 31.5–35.7)
MCV RBC AUTO: 81 FL (ref 79–97)
MONOCYTES # BLD AUTO: 0.7 X10E3/UL (ref 0.1–0.9)
MONOCYTES NFR BLD AUTO: 8 %
NEUTROPHILS # BLD AUTO: 6 X10E3/UL (ref 1.4–7)
NEUTROPHILS NFR BLD AUTO: 67 %
OTHER ANTIBIOTIC SUSC ISLT: ABNORMAL
PLATELET # BLD AUTO: 377 X10E3/UL (ref 150–450)
POTASSIUM SERPL-SCNC: 4.7 MMOL/L (ref 3.5–5.2)
RBC # BLD AUTO: 4.7 X10E6/UL (ref 3.77–5.28)
SODIUM SERPL-SCNC: 140 MMOL/L (ref 134–144)
WBC # BLD AUTO: 8.8 X10E3/UL (ref 3.4–10.8)

## 2024-07-31 RX ORDER — CEFDINIR 300 MG/1
300 CAPSULE ORAL 2 TIMES DAILY
Qty: 14 CAPSULE | Refills: 0 | Status: SHIPPED | OUTPATIENT
Start: 2024-07-31 | End: 2024-08-07

## 2024-08-01 ENCOUNTER — OFFICE VISIT (OUTPATIENT)
Dept: SLEEP MEDICINE | Facility: HOSPITAL | Age: 84
End: 2024-08-01
Payer: MEDICARE

## 2024-08-01 VITALS
WEIGHT: 183 LBS | DIASTOLIC BLOOD PRESSURE: 71 MMHG | BODY MASS INDEX: 32.43 KG/M2 | SYSTOLIC BLOOD PRESSURE: 158 MMHG | OXYGEN SATURATION: 94 % | HEART RATE: 80 BPM | HEIGHT: 63 IN

## 2024-08-01 DIAGNOSIS — N39.0 ACUTE UTI: ICD-10-CM

## 2024-08-01 DIAGNOSIS — F51.04 CHRONIC INSOMNIA: ICD-10-CM

## 2024-08-01 DIAGNOSIS — I10 ESSENTIAL HYPERTENSION: ICD-10-CM

## 2024-08-01 DIAGNOSIS — G47.33 OBSTRUCTIVE SLEEP APNEA, ADULT: Primary | ICD-10-CM

## 2024-08-01 PROCEDURE — G0463 HOSPITAL OUTPT CLINIC VISIT: HCPCS

## 2024-08-01 NOTE — PROGRESS NOTES
[facility]  [address]  [address]  [place] [address]  Phone   Fax     SLEEP CLINIC FOLLOW UP PROGRESS NOTE.    Annalee Melara  [id number]   1940  83 y.o.  female      PCP: Kehrer, Meredith Lea, MD      Date of visit: 8/1/2024    Chief Complaint   Patient presents with    Sleep Apnea       Medications and allergies are reviewed by me and documented in the encounter.     SOCIAL (habits pertaining to sleep medicine)  History tobacco use:No   History of alcohol use: 0 per week  Caffeine use: 0 beverages/d      Historians in today's encounter: Patient and Esther (caregiver)  Prior to the onset of the encounter I made sure that the patient provided verbal consent to me to discuss all of the patient's medical information to include any sensitive information/topics in front of the above noted individual also in the room. The patient insisted the above individual stay in the room for entire the encounter to completion.     HPI:  This is a 83 y.o. PMHx HTN.   Here  to review the results of her recent overnight PSG (AHI 8.1/hr on 7/3/2024 PSG).      She has NOT started therapy with autoCPAP I ordered for her.   She is firmly refusing therapy throughout the entire encounter many times.  She states she does not want to pursue AutoCPAP despite extensive counseling today and verbalizing understanding of risks which I have counseled and re-counseled multiple times throughout the encounter.  She does NOT have dentures. She does not want to pursue mandibular advancement device either.  She is not treatment eligible for Insprie.     Her caregiver Esther is asking for the sleep study results and whether the sleep study is accurate  Esther states she has heard there are lawsuits against treating sleep apnea       -She has been on hypnotic for many years by primary care   She is upset that primary care does not want to continue prescribing  She stays in bed if she's not sleepy  She  watches TV shows - after initial conclusion of the visit she came back in the room to tell me night of the sleep study she slept well because she didn't watch any shows that night and also because the temperature in sleep lab was cool - we discussed stimulus control counseling at great length many times  She'll also read in bed at night     She endorses Urinary symptoms as contributing to sleep problems  She has chronic UTI(s)  +Dysuria, urinary frequency  Contributes to sleep disturbances  Denies fevers or chills  States her PCP has recently started her on a medication for aucte Uti she is unable to tell me the name of this medication  Night of the sleep study urinary issues were noted to be contributing to patient's sleep disturbances quite significantly     -Obesity  Wt Readings from Last 3 Encounters:   08/01/24 83 kg (183 lb)   07/25/24 85.1 kg (187 lb 9.6 oz)   06/11/24 79.8 kg (176 lb)         -Last seen by me ~2 months ago on 6/6/2024 with her  Stef and her daughter Chanel in the room loud snoring and witnessed apneas by her  who also has sleep apnea  - 7/3/2024 PSG AHI 8.1/hr unrelated to the polysomnography PSG tech notes were reviewed patient had frequent urination and also complained of the discomfort/pain.  Was recommended to follow-up with PCP for acute on chronic possible UTI, interpretation.      REVIEW OF SYSTEMS:   Is negative unless otherwise noted in HPI  [place] Sleepiness Scale :Total score: 1     Disclaimer History: The above history is based on this sleep physician's in room encounter with the patient. Pre encounter self administered questionnaires are taken into consideration and discussed with patient for any discordance. The above documentation by this sleep physician is the most accurate clinical information determined by in room sleep physician encounter with patient.     PHYSICAL EXAMINATION:  Vitals:    08/01/24 1500   BP: 158/71   Pulse: 80   SpO2: 94%   Weight: 83 kg  "(183 lb)   Height: 160 cm (63\")    Body mass index is 32.42 kg/m².   CONSTITUTIONAL: Well appearing, in no overt pain or respiratory distress   ENT: Mallampati IV, Macroglossia   RESP SYSTEM:  No overt respiratory distress, speaks in clear sentences without dyspnea, no accessory muscle use  CARDIOVASULAR: No edema noted  NEURO: Oriented x 3, no gross focal deficits     PDMP reviewed      Records reviewed  -7/31/24 PCP prescription for cefdinir growing Klebsiella on urine culture  -7/25/24 office  visit PCP    Diagnostics reviewed:  -UA 7/25/24    -UC 7/25/24 Klebsiella see full report for further details  -7/3/2024 PSG reviewed in great detail with patient and caregiver Esther in room     ASSESSMENT AND PLAN:  Obstructive sleep apnea ( G 47.33).    -I have reassured the patient and caregiver the sleep study (an overnight polysomnography) was the gold standard for diagnosis of sleep apnea and sleep apnea most certain exists for this patient. Esther the caregiver was very guarded today to discussing sleep apnea treatment, I answered many questions on many topics to include great detail about evidence based approach to treatment of sleep apnea therapy safe and effective with PAP the gold standard for treatment of sleep apnea, as well as past issues with Martines recall (no longer issuing Martines devices). I also instructed the patient and caregiver if they want the results for the sleep study they must follow up with medical records to have it printed with them. I did go through the sleep study in great detail with them in room even reviewing hypnogram screen shot with them to help them understand the significance of sleep disordered breathing events. I will make it very clear that no sleep study will ever be reviewed or treatment will ever be reviewed by me on the phone with the patient or any caregivers, must be seen in sleep clinic.  -Counseled the patient without proper control of sleep apnea and good compliance there " is a increased risk for hypertension, diabetes mellitus and nonrestorative sleep with hypersomnia which can increase risk for motor vehicle accidents.  Untreated sleep apnea is also a risk factor for development of atrial fibrillation, pulmonary hypertension, insulin resistance and stroke.  -At great length I went over treatment options for her to include Positive airway pressure therapy or mandibular advancement device. She is not eligible for Inspire. She firmly declined treatment of sleep apnea despite extensive counseling. I made her aware she risks permanent disability and or increased chance of death with untreated sleep apnea. She verbalized understanding of these risks she continued to refuse.   -Patient will follow up with primary care physician. Weight loss is advised as adjunctive never primary for sleep apnea. Should she change her mind about sleep apnea therapy management with PAP or mandibular advancement device she will need to discuss with her primary care referral back to sleep clinic to discuss management before starting therapy.  Obesity, with BMI is Body mass index is 32.42 kg/m².. Counseled weight loss will be beneficial for reduction in severity of sleep apnea, healthy diet/exercise to achieve same, follow up with primary care physician for serial monitoring and to further guide management.  Insomnia (Chronic)  -Must treat underlying cause of insomnia, contributing factors in today's sleep clinic visit to include: refused treatment of sleep apnea see above, most certainly medical conditions contributing as was noted on night of PSG and continues treatment for acute on chronic UTI must follow up with PCP (follow up with PCP for medical management and to ensure Urology following), CBTi most certainly will be beneficial (very reluctant to accept - referral has been placed see below)  -Stimulus control counseling is AASM Standard Recommendation regarding insomnia: counseling initiated to help  establish and strength positive association between falling asleep and bed and bedtime routines. The following instructions were emphasized:  Maintain regular sleep-wake schedule  Avoiding napping   Use bed only for nocturnal sleep  Attempt to fall asleep ONLY when sleepy   If unable to fall asleep within 20 minutes leave the bed and pursue a relaxing boring activity in a dark or dimly lit area, then return to bed only when sleepy (This STEP MUST BE REPEATED as often as necessary with emphasis to only attempt to fall asleep in bed when sleepy)   -Counseled bedroom environment: dark, cool temperature, quiet, clear of clutter   -Hypnotics have long term been continued by her primary care and will be at the discretion of her primary care. She should work with primary care and CBT-I to wean to complete cessation. Long term use of hypnotics in geriatric female with untreated sleep apnea is not advised - I have made her aware of this recommendation and she was not happy about this - I made her specifically aware she at risk for excessive respiratory sedation/permanent disability and or death especially with untreated sleep apnea, she verbalized understanding of her risks and wishes to continue this medication with her primary care. This medication at discretion of current prescribing physician.  -Absolutely no hypnotic agent will be prescribed from sleep medicine (If I ever see any Religion Sleep Physician attempt to prescribe same I will be identifying inappropriate treatment and discontinuing as long as the patient has continued follow ups with me).  --Referred patient to psychology for CBT-I. Encouraged patient to do research and contact their personal insurance for a sleep psychologist. I provided patient with the following sleep psychologist's information for [facility] who also provides telehealth services: Dr. Gaetano Guzman's ([facility], [facility])   [employer]  Address[facility] [address] [place], [place] [address] [id number], [place] [address]  Phone:  [phone]  Acute on chronic UTI, Contributing to sleep disturbances as was clearly noted on overnight polysomnography. Follow up with PCP/Urology for management.      Follow up with PCP / CBTi . Patient's questions were answered.      Time based visit 71 minutes: to include in room history/exam/extensive counseling/review of plan with patient/same day review of medications/review of multiple diagnostics/review of prior medical records /  [facility]BT[facility] referral provided    EMR Dragon/Transcription disclaimer:   Much of this encounter note is an electronic transcription/translation of spoken language to printed text. The electronic translation of spoken language may permit erroneous, or at times, nonsensical words or phrases to be inadvertently transcribed; Although I have reviewed the note for such errors, some may still exist.       NPI #: [id number]    Radha Arroyo, DO  Sleep Medicine  [facility]  08/01/24

## 2024-08-01 NOTE — PATIENT INSTRUCTIONS
We provided you with a referral for psychology for CBT-I. You are encouraged to do research and contact your personal insurance for a sleep psychologist who will best take your insurance. We are providing you with the following sleep psychologist's information for [facility] who also provides telehealth services: Dr. Gaetano Guzman's ([facility], [facility])   [email or web address]  Address: [address] [place], [place] [address]  Phone: [phone]

## 2024-08-07 DIAGNOSIS — N39.0 CHRONIC UTI: ICD-10-CM

## 2024-08-07 RX ORDER — NITROFURANTOIN 25; 75 MG/1; MG/1
100 CAPSULE ORAL DAILY
Qty: 30 CAPSULE | Refills: 0 | Status: SHIPPED | OUTPATIENT
Start: 2024-08-07

## 2024-08-16 DIAGNOSIS — F51.04 CHRONIC INSOMNIA: ICD-10-CM

## 2024-08-16 RX ORDER — ESZOPICLONE 2 MG/1
TABLET, FILM COATED ORAL
Qty: 15 TABLET | Refills: 0 | OUTPATIENT
Start: 2024-08-16

## 2024-09-09 DIAGNOSIS — F32.A DEPRESSION, UNSPECIFIED DEPRESSION TYPE: ICD-10-CM

## 2024-09-09 RX ORDER — OMEPRAZOLE 40 MG/1
CAPSULE, DELAYED RELEASE ORAL
Qty: 90 CAPSULE | Refills: 0 | Status: SHIPPED | OUTPATIENT
Start: 2024-09-09

## 2024-09-09 RX ORDER — DULOXETIN HYDROCHLORIDE 60 MG/1
60 CAPSULE, DELAYED RELEASE ORAL DAILY
Qty: 90 CAPSULE | Refills: 0 | Status: SHIPPED | OUTPATIENT
Start: 2024-09-09

## 2024-09-12 DIAGNOSIS — N39.0 CHRONIC UTI: ICD-10-CM

## 2024-09-12 RX ORDER — NITROFURANTOIN 25; 75 MG/1; MG/1
100 CAPSULE ORAL DAILY
Qty: 90 CAPSULE | Refills: 0 | Status: SHIPPED | OUTPATIENT
Start: 2024-09-12 | End: 2024-09-16

## 2024-09-12 NOTE — TELEPHONE ENCOUNTER
Rx Refill Note  Requested Prescriptions     Pending Prescriptions Disp Refills    nitrofurantoin, macrocrystal-monohydrate, (MACROBID) 100 MG capsule [Pharmacy Med Name: Nitrofurantoin Monohyd Macro 100 MG Oral Capsule] 30 capsule 0     Sig: Take 1 capsule by mouth once daily      Last office visit with prescribing clinician:   07/25/2024    Please advise this refill

## 2024-09-16 DIAGNOSIS — N39.0 CHRONIC UTI: ICD-10-CM

## 2024-09-16 DIAGNOSIS — I26.99 PULMONARY EMBOLISM, UNSPECIFIED CHRONICITY, UNSPECIFIED PULMONARY EMBOLISM TYPE, UNSPECIFIED WHETHER ACUTE COR PULMONALE PRESENT: ICD-10-CM

## 2024-09-16 RX ORDER — NITROFURANTOIN 25; 75 MG/1; MG/1
100 CAPSULE ORAL DAILY
Qty: 90 CAPSULE | Refills: 0 | Status: SHIPPED | OUTPATIENT
Start: 2024-09-16

## 2024-09-16 RX ORDER — RIVAROXABAN 20 MG/1
TABLET, FILM COATED ORAL
Qty: 90 TABLET | Refills: 0 | Status: SHIPPED | OUTPATIENT
Start: 2024-09-16

## 2024-10-18 DIAGNOSIS — I10 ESSENTIAL HYPERTENSION: ICD-10-CM

## 2024-10-18 RX ORDER — LISINOPRIL 10 MG/1
10 TABLET ORAL DAILY
Qty: 90 TABLET | Refills: 0 | Status: SHIPPED | OUTPATIENT
Start: 2024-10-18

## 2024-10-29 ENCOUNTER — OFFICE VISIT (OUTPATIENT)
Dept: FAMILY MEDICINE CLINIC | Facility: CLINIC | Age: 84
End: 2024-10-29
Payer: MEDICARE

## 2024-10-29 VITALS
WEIGHT: 180.4 LBS | BODY MASS INDEX: 31.96 KG/M2 | OXYGEN SATURATION: 97 % | DIASTOLIC BLOOD PRESSURE: 60 MMHG | HEART RATE: 68 BPM | SYSTOLIC BLOOD PRESSURE: 114 MMHG | TEMPERATURE: 98.5 F | HEIGHT: 63 IN

## 2024-10-29 DIAGNOSIS — S32.000S COMPRESSION FRACTURE OF LUMBAR VERTEBRA, UNSPECIFIED LUMBAR VERTEBRAL LEVEL, SEQUELA: ICD-10-CM

## 2024-10-29 DIAGNOSIS — I10 ESSENTIAL HYPERTENSION: Primary | ICD-10-CM

## 2024-10-29 DIAGNOSIS — R73.03 PREDIABETES: ICD-10-CM

## 2024-10-29 DIAGNOSIS — G62.9 PERIPHERAL POLYNEUROPATHY: ICD-10-CM

## 2024-10-29 DIAGNOSIS — N39.0 CHRONIC UTI: ICD-10-CM

## 2024-10-29 DIAGNOSIS — M48.061 SPINAL STENOSIS OF LUMBAR REGION WITHOUT NEUROGENIC CLAUDICATION: ICD-10-CM

## 2024-10-29 DIAGNOSIS — Z23 FLU VACCINE NEED: ICD-10-CM

## 2024-10-29 DIAGNOSIS — I26.99 PULMONARY EMBOLISM, UNSPECIFIED CHRONICITY, UNSPECIFIED PULMONARY EMBOLISM TYPE, UNSPECIFIED WHETHER ACUTE COR PULMONALE PRESENT: ICD-10-CM

## 2024-10-29 LAB
BILIRUB BLD-MCNC: ABNORMAL MG/DL
CLARITY, POC: CLEAR
COLOR UR: YELLOW
EXPIRATION DATE: ABNORMAL
GLUCOSE UR STRIP-MCNC: NEGATIVE MG/DL
KETONES UR QL: NEGATIVE
LEUKOCYTE EST, POC: ABNORMAL
Lab: ABNORMAL
NITRITE UR-MCNC: POSITIVE MG/ML
PH UR: 6 [PH] (ref 5–8)
PROT UR STRIP-MCNC: ABNORMAL MG/DL
RBC # UR STRIP: ABNORMAL /UL
SP GR UR: 1.02 (ref 1–1.03)
UROBILINOGEN UR QL: NORMAL

## 2024-10-29 PROCEDURE — 99214 OFFICE O/P EST MOD 30 MIN: CPT | Performed by: FAMILY MEDICINE

## 2024-10-29 PROCEDURE — 90662 IIV NO PRSV INCREASED AG IM: CPT | Performed by: FAMILY MEDICINE

## 2024-10-29 PROCEDURE — 3078F DIAST BP <80 MM HG: CPT | Performed by: FAMILY MEDICINE

## 2024-10-29 PROCEDURE — 1126F AMNT PAIN NOTED NONE PRSNT: CPT | Performed by: FAMILY MEDICINE

## 2024-10-29 PROCEDURE — 81003 URINALYSIS AUTO W/O SCOPE: CPT | Performed by: FAMILY MEDICINE

## 2024-10-29 PROCEDURE — 3074F SYST BP LT 130 MM HG: CPT | Performed by: FAMILY MEDICINE

## 2024-10-29 PROCEDURE — G0008 ADMIN INFLUENZA VIRUS VAC: HCPCS | Performed by: FAMILY MEDICINE

## 2024-10-29 RX ORDER — GABAPENTIN 100 MG/1
100 CAPSULE ORAL 3 TIMES DAILY
Qty: 90 CAPSULE | Refills: 0 | Status: SHIPPED | OUTPATIENT
Start: 2024-10-29

## 2024-10-29 NOTE — PROGRESS NOTES
"Chief Complaint  Med Refill, Back Pain, Insomnia, Hypertension, Peripheral Neuropathy, and Hyperlipidemia    Subjective        Annalee Melara presents to [facility]  History of Present Illness  History of Present Illness  The patient is an 84-year-old female who presents for a routine follow-up on multiple chronic medical problems, including hypertension and a history of pulmonary embolism (PE).    She reports a general decline in her health since her last visit. She experiences a sensation akin to an arrow piercing her back, a condition that has worsened over the years. She has not consulted a back specialist for some time and has not attempted to alleviate her symptoms with ice or heat therapy. She does not take Tylenol for pain relief and has not tried lidocaine patches. There is no recent history of injuries, and she reports no loss of bowel or bladder control.    She also suffers from neuropathy, which has recently become more severe. She describes her neuropathy symptoms as sharp pain, stinging, and burning, affecting both legs equally. She has previously taken gabapentin for her neuropathy, which provided some relief, but she is currently not taking any medication for this condition.    Her sleep is often disrupted due to discomfort when sleeping in a lift chair.  She refuses CPAP machine from the sleep clinic and is asking again for sleep medicine today which I declined.    She experienced symptoms of a urinary tract infection (UTI) yesterday, but these have since improved. She reports no fever or chills associated with the UTI.    She admits to consuming a significant amount of sugar and expresses a desire to lose 10 pounds.       Objective   Vital Signs:  /60   Pulse 68   Temp 98.5 °F (36.9 °C)   Ht 160 cm (63\")   Wt 81.8 kg (180 lb 6.4 oz)   SpO2 97%   BMI 31.96 kg/m²   Estimated body mass index is 31.96 kg/m² as calculated from the following:    Height as of " "this encounter: 160 cm (63\").    Weight as of this encounter: 81.8 kg (180 lb 6.4 oz).               Physical Exam   Physical Exam  Patient is alert, in no acute distress.  Heart rhythm is regular.  No swelling in the musculoskeletal system.    Vital Signs  Blood pressure measures 114/60.       Result Review :          Results  Laboratory Studies  A1c was 6.0.    Imaging  Last MRI of lumbar and spine showed multiple old compression fractures.     UA positive nitrites           Assessment and Plan     Diagnoses and all orders for this visit:    1. Essential hypertension (Primary)  -     TSH  -     Comprehensive Metabolic Panel  -     CBC & Differential  -     Hemoglobin A1c    2. Chronic UTI  -     Urine Culture - Urine, Urine, Clean Catch  -     POC Urinalysis Dipstick, Automated    3. Pulmonary embolism, unspecified chronicity, unspecified pulmonary embolism type, unspecified whether acute cor pulmonale present    4. Prediabetes  -     Hemoglobin A1c    5. Spinal stenosis of lumbar region without neurogenic claudication  -     Ambulatory Referral to Spine Surgery    6. Compression fracture of lumbar vertebra, unspecified lumbar vertebral level, sequela  -     Ambulatory Referral to Spine Surgery    7. Flu vaccine need  -     Fluzone High-Dose 65+yrs ([id number])    8. Peripheral polyneuropathy  -     gabapentin (NEURONTIN) 100 MG capsule; Take 1 capsule by mouth 3 (Three) Times a Day.  Dispense: 90 capsule; Refill: 0      Assessment & Plan  1. Spinal Stenosis.  She has a history of spinal stenosis and degenerative disc disease, with multiple old compression fractures noted on her last MRI of the lumbar spine 3 years ago at [facility]. No recent injuries reported. A referral will be made to a spine specialist for further evaluation. She is advised to avoid starting physical therapy until after seeing the specialist due to the complexity of her condition.    2. Neuropathy.  She reports worsening neuropathy " symptoms, including sharp pain, stinging, and burning. She has previously been on gabapentin. A low dose of gabapentin will be initiated, with a follow-up scheduled in a month to assess its effectiveness.    3. Chronic Pain.  She is not currently taking any pain medication. Samples of 4% lidocaine patches will be provided for her to try. She is advised to use ice or heat as needed for pain relief.    4. Urinary Tract Infection (UTI).  She reports symptoms suggestive of a UTI, which have improved today.  Because of her history, will wait for culture before treating.    5. Prediabetes.  Her A1c level was 6.0 during her last check a few months ago. She is advised to monitor her sugar intake. If her A1c indicates diabetes, Ozempic will be considered for weight loss and diabetes management.    6. Hypertension.  Her blood pressure is currently well-controlled at 114/60 mmHg. She is advised to continue her current medication regimen of lisinopril 10 mg.    7. Sleep Apnea.  She is advised to follow up with a sleep specialist regarding the use of a CPAP machine. She is encouraged to reconsider trying the CPAP for better sleep quality.    8. Health Maintenance.  An influenza vaccine will be administered today.    Follow-up  Return in 1 month for follow up.            Follow Up     No follow-ups on file.  Patient was given instructions and counseling regarding her condition or for health maintenance advice. Please see specific information pulled into the AVS if appropriate.    Patient or patient representative verbalized consent for the use of Ambient Listening during the visit with  Meredith Lea Kehrer, MD for chart documentation. 10/29/2024  15:57 EDT

## 2024-10-30 LAB
ALBUMIN SERPL-MCNC: 4.3 G/DL (ref 3.7–4.7)
ALP SERPL-CCNC: 163 IU/L (ref 44–121)
ALT SERPL-CCNC: 11 IU/L (ref 0–32)
AST SERPL-CCNC: 18 IU/L (ref 0–40)
BASOPHILS # BLD AUTO: 0.1 X10E3/UL (ref 0–0.2)
BASOPHILS NFR BLD AUTO: 1 %
BILIRUB SERPL-MCNC: 0.4 MG/DL (ref 0–1.2)
BUN SERPL-MCNC: 17 MG/DL (ref 8–27)
BUN/CREAT SERPL: 19 (ref 12–28)
CALCIUM SERPL-MCNC: 9.7 MG/DL (ref 8.7–10.3)
CHLORIDE SERPL-SCNC: 101 MMOL/L (ref 96–106)
CO2 SERPL-SCNC: 24 MMOL/L (ref 20–29)
CREAT SERPL-MCNC: 0.88 MG/DL (ref 0.57–1)
EGFRCR SERPLBLD CKD-EPI 2021: 65 ML/MIN/1.73
EOSINOPHIL # BLD AUTO: 0.2 X10E3/UL (ref 0–0.4)
EOSINOPHIL NFR BLD AUTO: 2 %
ERYTHROCYTE [DISTWIDTH] IN BLOOD BY AUTOMATED COUNT: 16.5 % (ref 11.7–15.4)
GLOBULIN SER CALC-MCNC: 2.8 G/DL (ref 1.5–4.5)
GLUCOSE SERPL-MCNC: 121 MG/DL (ref 70–99)
HBA1C MFR BLD: 5.9 % (ref 4.8–5.6)
HCT VFR BLD AUTO: 39.5 % (ref 34–46.6)
HGB BLD-MCNC: 11.6 G/DL (ref 11.1–15.9)
IMM GRANULOCYTES # BLD AUTO: 0 X10E3/UL (ref 0–0.1)
IMM GRANULOCYTES NFR BLD AUTO: 0 %
LYMPHOCYTES # BLD AUTO: 1.7 X10E3/UL (ref 0.7–3.1)
LYMPHOCYTES NFR BLD AUTO: 17 %
MCH RBC QN AUTO: 23.5 PG (ref 26.6–33)
MCHC RBC AUTO-ENTMCNC: 29.4 G/DL (ref 31.5–35.7)
MCV RBC AUTO: 80 FL (ref 79–97)
MONOCYTES # BLD AUTO: 0.7 X10E3/UL (ref 0.1–0.9)
MONOCYTES NFR BLD AUTO: 6 %
NEUTROPHILS # BLD AUTO: 7.6 X10E3/UL (ref 1.4–7)
NEUTROPHILS NFR BLD AUTO: 74 %
PLATELET # BLD AUTO: 443 X10E3/UL (ref 150–450)
POTASSIUM SERPL-SCNC: 5.1 MMOL/L (ref 3.5–5.2)
PROT SERPL-MCNC: 7.1 G/DL (ref 6–8.5)
RBC # BLD AUTO: 4.94 X10E6/UL (ref 3.77–5.28)
SODIUM SERPL-SCNC: 139 MMOL/L (ref 134–144)
TSH SERPL DL<=0.005 MIU/L-ACNC: 0.79 UIU/ML (ref 0.45–4.5)
WBC # BLD AUTO: 10.2 X10E3/UL (ref 3.4–10.8)

## 2024-10-31 ENCOUNTER — DOCUMENTATION (OUTPATIENT)
Dept: SLEEP MEDICINE | Facility: HOSPITAL | Age: 84
End: 2024-10-31
Payer: MEDICARE

## 2024-10-31 NOTE — PROGRESS NOTES
Pt called and wants to start on her CPAP therapy per her primary doctor.  Dr. Arroyo had seen Pt on 08/2024, Pt had Therapy order by Dr. Arroyo, Pt never used her therapy.  I spoke with  And he has stated that Pt must come in for a follow up before another PAP order can be written and therapy started. Pt states she will call back when she has transportation ans schedule the appt.

## 2024-11-01 LAB
BACTERIA UR CULT: ABNORMAL
BACTERIA UR CULT: ABNORMAL
OTHER ANTIBIOTIC SUSC ISLT: ABNORMAL

## 2024-11-04 DIAGNOSIS — N39.0 CHRONIC UTI: Primary | ICD-10-CM

## 2024-11-04 RX ORDER — CEFDINIR 300 MG/1
300 CAPSULE ORAL 2 TIMES DAILY
Qty: 14 CAPSULE | Refills: 0 | Status: SHIPPED | OUTPATIENT
Start: 2024-11-04 | End: 2024-11-11

## 2024-11-06 DIAGNOSIS — I10 ESSENTIAL HYPERTENSION: ICD-10-CM

## 2024-11-06 RX ORDER — LISINOPRIL 10 MG/1
10 TABLET ORAL DAILY
Qty: 90 TABLET | Refills: 0 | Status: SHIPPED | OUTPATIENT
Start: 2024-11-06

## 2024-11-06 NOTE — TELEPHONE ENCOUNTER
Rx Refill Note  Requested Prescriptions     Pending Prescriptions Disp Refills    lisinopril (PRINIVIL,ZESTRIL) 10 MG tablet [Pharmacy Med Name: Lisinopril 10 MG Oral Tablet] 90 tablet 0     Sig: Take 1 tablet by mouth once daily      Last office visit with prescribing clinician: 10/29/2024   Last telemedicine visit with prescribing clinician: Visit date not found   Next office visit with prescribing clinician: 11/27/2024                         Would you like a call back once the refill request has been completed: [] Yes [] No    If the office needs to give you a call back, can they leave a voicemail: [] Yes [] No    Gayle Marsh MA  11/06/24, 12:59 EST

## 2024-11-07 ENCOUNTER — OFFICE VISIT (OUTPATIENT)
Dept: SLEEP MEDICINE | Facility: HOSPITAL | Age: 84
End: 2024-11-07
Payer: MEDICARE

## 2024-11-07 VITALS
OXYGEN SATURATION: 97 % | WEIGHT: 180 LBS | HEIGHT: 63 IN | DIASTOLIC BLOOD PRESSURE: 79 MMHG | SYSTOLIC BLOOD PRESSURE: 151 MMHG | BODY MASS INDEX: 31.89 KG/M2 | HEART RATE: 78 BPM

## 2024-11-07 DIAGNOSIS — N39.0 URINARY TRACT INFECTION WITHOUT HEMATURIA, SITE UNSPECIFIED: ICD-10-CM

## 2024-11-07 DIAGNOSIS — F99 INAPPROPRIATE BEHAVIOR: ICD-10-CM

## 2024-11-07 DIAGNOSIS — G47.33 OBSTRUCTIVE SLEEP APNEA, ADULT: Primary | ICD-10-CM

## 2024-11-07 DIAGNOSIS — I10 ESSENTIAL HYPERTENSION: ICD-10-CM

## 2024-11-07 DIAGNOSIS — F51.04 CHRONIC INSOMNIA: ICD-10-CM

## 2024-11-07 DIAGNOSIS — E66.811 CLASS 1 OBESITY WITH SERIOUS COMORBIDITY AND BODY MASS INDEX (BMI) OF 31.0 TO 31.9 IN ADULT, UNSPECIFIED OBESITY TYPE: ICD-10-CM

## 2024-11-07 DIAGNOSIS — F32.A DEPRESSION, UNSPECIFIED DEPRESSION TYPE: ICD-10-CM

## 2024-11-07 DIAGNOSIS — R45.1 AGITATED: ICD-10-CM

## 2024-11-07 PROCEDURE — G0463 HOSPITAL OUTPT CLINIC VISIT: HCPCS

## 2024-11-07 NOTE — PROGRESS NOTES
[facility]  [address]  [address]  [place] [address]  Phone   Fax     SLEEP CLINIC FOLLOW UP PROGRESS NOTE.    Annalee Melara  [id number]   1940  84 y.o.  female      PCP: Kehrer, Meredith Lea, MD      Date of visit: 11/7/2024    Chief Complaint   Patient presents with    Sleep Apnea       Medications and allergies are reviewed by me and documented in the encounter.     SOCIAL (habits pertaining to sleep medicine)  History tobacco use:No   History of alcohol use: 0 per week  Caffeine use: 1+ beverages/d      Historians in today's encounter: Patient and    Esther - whom made the comment as we entered the room today that she was planning on not telling me her name today and is happy that I remembered her name from last visit (family friend/caregiver)    Prior to the onset of the encounter I made sure that the patient provided verbal consent to me to discuss all of the patient's medical information to include any sensitive information/topics in front of the above noted individual also in the room. The patient insisted the above individual stay in the room for entire the encounter to completion.       HPI:    This is a 84 y.o. 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  Here for management of obstructive sleep apnea (AHI 8.1/hr on 7/3/2024 PSG).  She has previously refused PAP therapy. She discussed with her PCP and was convinced to RTO to sleep clinic to re discuss PAP therapy.    -Without PAP apneic events  persists  -She is more open to getting back on PAP therapy she feels her general health is declining  -She has dysuria/urinary frequency she is on antibiotics for same - she can't tell me the name of her current antibiotic-  she states she was called in new antibiotic because of resistance to initial prescribed - she is compliant  -She persists with recurrent urinary tract infections (she saw a Urologist in [place] she states this Urologist stood in the villatoro for 5 minutes and then told her she does not need to follow up with him anymore). She states she had a very bad experience with that urologist and will not go back to that specific urologist.    -She sleeps in a recliner fatimah  estimated at least 45 degrees and persists with apneic events even in recliner   She stays in this same recliner all day and all night  She naps during the day at least 1 hour   She watches TV when she can't sleep   She drinks multiple beverages throughout the day believing they have no caffeine these bevarages are decaff to include coffee/tea/sodas (decaff beverages due have caffeine I made her aware of this)  She refuses CBTi I have referred in the past  She states she is depressed   Her PCP has her on duloxetine she states she takes this at night   Her PCP appropriately weaned her off zolpidem       -BP in sleep clinic uncontrolled  Stats she's compliant with home therapies reviewed  States she feels well today save depression   Denies si      REVIEW OF SYSTEMS:   Is negative unless otherwise noted in HPI  [place] Sleepiness Scale :Total score: 0     Disclaimer History: The above history is based on this sleep physician's in room encounter with the patient. Pre encounter self administered questionnaires are taken into consideration and discussed with patient for any discordance. The above documentation by this sleep physician is the most accurate clinical information determined by in room sleep physician encounter with patient.  "    PHYSICAL EXAMINATION:  Vitals:    11/07/24 1100   BP: 151/79   Pulse: 78   SpO2: 97%   Weight: 81.6 kg (180 lb)   Height: 160 cm (62.99\")    Body mass index is 31.89 kg/m².   CONSTITUTIONAL:  Frail appearing elderly female,  in no overt pain or respiratory distress   ENT: Mallmapati is IV with tongue ridging and macroglossia  RESP SYSTEM: Breath sounds are diminished bilateral (no rales/rhonchi/wheezes), No overt respiratory distress, speaks in clear sentences without dyspnea, no accessory muscle use  CARDIOVASULAR: RRR, no rub, no gallop, No edema noted  NEURO: Oriented x 3, she ambulates with a cane  Psychiatric: Affect is flat, she is very guarded despite extensive counseling provided to her in room today.  She also appears agitated at times and at times she does raise her voice at me, I did  her that she can ask me as many questions as she wants that we have a lot to review, she does become more calm when I did  her but continues to remain very guarded, she has also threatened to bite my finger when I request for her to open her mouth so I can visualize her Mallampati at which point her caregiver Esther replies to me \"she's not joking\"    Labs reviewed  -10/29/2024 UTI urine culture also reviewed growing Klebsiella  Records reviewed  -Last seen in sleep clinic ~2 months ago on 8/1/2024 a great deal of counseling was provided to the patient and start auto PS CPAP with her caregiver Readlyn in the room he was very guarded.  The patient firmly refused treatment of her sleep apnea despite extensive counseling and verbalizing understanding of risks.  She was counseled to follow-up with her PCP to guide weight management to help with sleep apnea.  She has a hypnotic dependence managed by her primary care for some great time.  I provided her stimulus control counseling.  I referred her to CBTi.  After conclusion of the encounter she came back into the room to tell me she sleeps well when she is not " watching TV.  - 9/29/2024 office visit reviewed with PCP  Diagnostics reviewed  -See the PSG documented in HPI  I literally brought up this PSG with her and showed her a screenshot that I took of all her sleep disordered breathing noted that night to give her visual understanding  PDMP reviewed  - She last filled Lunesta 2 mg 15 tabs prescribed by Dr. Meredith Kehrer 7/11/2024 - she has none left reported to me today     ASSESSMENT AND PLAN:  Obstructive sleep apnea ( G 47.33).    -Starting patient on AutoCPAP very narrow range pressure settings 5-10 cm H2O, Ramp 5 cm H2O for 15 minutes, EPR OFF  -Mask fitting ordered with Dreamwear nasal mask (she can ultimately choose any type of mask she wants)  -Scheduling next visit 3 months from now (counseled her compliance requirement no sooner than 31 days after receiving device, no later than 90 days after receiving device. Provided both her and Esther verbal teach back to call push back appointment by 1 month if she has not had PAP without at least 31 days before next visit with me)  -She asked me to choose a DME for her we agreed on [employer]   -I also counseled her multiple times on insurance compliance requirements, not to return PAP without return to office visit, I am also placing order to DME PATIENT CAN NOT DISCONTINUE PAP WITHOUT A PHYSICIAN DISCONTINUE ORDER  -Let me make it very clear no medications for sleep induction or sleep maintenance will be prescribed by sleep medicine as long as she has continued follow ups with me - see chronic insomnia below.    - I spent a great deal of time with them in room today. This patient requires a lot of counseling  see below - we had several patients in sleep clinic delayed in being seen due to time spent with patient today. I will not be discussing PAP therapy/sleep disorders concern over the phone with the patient or family member at any time. If she has problems with PAP therapy she can be offered sooner in clinic follow up  with me in sleep clinic for clinical correlation and appropriate evaluation/direction.    Counseled the patient pathophysiology of sleep apnea.  Signs and symptoms of sleep apnea.  Consequences of untreated sleep apnea the most serious of which increase risk for cerebrovascular accident, increased for neurodegenerative conditions, increased for arrhythmias such as atrial fibrillation, hypertension, insulin resistance, mood disorders such as depression which he suffers from.  With a comorbid medical history and the way that she examines with a diminished breath sounds she is also at risk for excessive respiratory sedation and/or death.  I made her aware of all of the above.  I gave her and her caregiver Esther in room today multiple opportunities to ask me all the questions they wanted to their verbalized satisfaction.  I paused multiple times throughout the entire encounter to have them ask questions.  I repeated things for them as often as they requested.  They verbalized satisfaction with all answers I gave him    HTN, Follow up with primary care physician for continued management. Comorbid medical condition makes the patient eligible for a trial of PAP therapy even with sleep study revealing mild severity sleep apnea.  Obesity, with BMI is Body mass index is 31.89 kg/m².. Counseled weight loss will be beneficial for reduction in severity of sleep apnea, healthy diet/exercise to achieve same, follow up with primary care physician for serial monitoring and to further guide management.  Depression/Agitation/Inappropriate behavior, She should establish care with psychiatry I made her and caregiver thoroughly aware of my recommendation.  I counseled her same this visit - and answered all her questions and her caregivers questions to their verbalized satisfaction with multiple opportunities provided to them to ask questions and multiple pauses to ask questions. I requested my clinical notes forwarded to her PCP. I did   her to remain calm when she talks to me she can ask me as many questions as she wants. She verbalized satisfaction with answers I gave her. I also made her aware it is not appropriate to threaten to bite the physicians finger. If I do encounter continued inappropriate behavior I will be issuing  practice dismissal. I will also not tolerate any inappropriate behavior towards my sleep staff if it ever reported to me and this will also lead to grounds for practice dismissal.Counseled patient to follow up with PCP to discuss psychiatry referral. Doxepin may be a potentially better alternative for her. Ideally all mood disorder medication should be guided by psychiatry. I will not allow sleep medicine to start/change/augment any mood disorder medications or medications that may effect mood without input from psychiatry - this includes doxpein - as long as the patient has continued scheduled follow ups with me. Be aware avoid cymbatla + doxpein at same time potential for serotonin syndrome and uncontrolled HTN. Less than ideal that Cymbalta is being taken qhs (this medication can contribute to the insomnia) - I made her aware of this and instructed both care giver and her to follow up with PCP to change this medication dose/frequency/time taken only at discretion of her prescribing clinician. I requested my clinical notes forwarded to her PCP.  Acute on chronic Uti(s), will contribute to insomnia. Urology referral strongly recommended -She needs to have Chronic UTI(s) addressed please consider getting patient to a different urologist I counseled her to follow up with PCP for same. I would advise to her primary care team: getting care coordination involved as well / consider  / and gyn referral as well - hygiene should be addressed to ensure not contributing to recurrent UTI(s) multidisciplinary approach would be best. Need to avoid centrally hypnotic agents especially due to underlying medical  condition contributing to insomnia symptoms.  Chronic insomnia:  -Reassuring this geriatric female with a fall risk/dementia concerns in the past has been weaned off by primary care from centrally acting hypnotic    If I ever see any covering sleep medicine physician attempt to do this and she has continued follow ups with me I will be discontinuing any inappropriate therapies and identifying any inappropriate therapies started by sleep medicine physicians as long as she continues to see me in sleep clinic. I made patient aware of the above. I also made her aware the only time a physician should start centrally acting medication is under the supervision and guidance of palliative care physician she expressed interest in this but her family friend Esther stated she would not want this - they can follow up with primary care to further this discussion.  Goal is always treat underlying cause:  -I will treat her sleep disordered breathing as stated above.  -Her multiple inadequate circumstances of sleep borderling precluding this diagnosis I spent great deal of time addressing stimulus control counseling with her (Her and caregiver appeared guarded to counseling)  -She needs to have Chronic UTI(s) addressed please consider getting patient to a different urologist I counseled her to follow up with PCP for same. I would advise getting care coordination involved as well / consider  / and gyn referral as well - hygiene should be addressed.   -Depression: counseled patient to follow up with PCP to discuss psychiatry referral. Doxepin may be a potentially better alternative for her. Ideally all mood disorder medication should be guided by psychiatry. I will not allow sleep medicine to start/change/augment any mood disorder medications or medications that may effect mood without input from psychiatry. Be aware avoid cymbatla + doxpein at same time potential for serotonin syndrome and uncontrolled HTN. Less than  ideal that Cymbalta is being taken qhs (this medication can contribute to the insomnia). I requested my clinical notes forwarded to her PCP  -Stimulus control counseling is AASM Standard Recommendation regarding insomnia: counseling initiated to help establish and strength positive association between falling asleep and bed and bedtime routines. The following instructions were emphasized:  Maintain regular sleep-wake schedule  Avoiding napping   Use bed only for nocturnal sleep or intimacy   Attempt to fall asleep ONLY when sleepy   If unable to fall asleep within 20 minutes leave the bed and pursue a relaxing boring activity in a dark or dimly lit area, then return to bed only when sleepy (This STEP MUST BE REPEATED as often as necessary with emphasis to only attempt to fall asleep in bed when sleepy)   -I offered CBTi referral again. She's holding off. Fair to say we should treat all of the above first.           Follow up in 3 months . Patient's questions were answered.    Time based visit 140 minutes: to include in room history/exam/extensive counseling/same day review of medications/review of plan with patient/review of labs/review of diagnostics/independent conclusions drawn from prior diagnostics/ review of prior medical records /PDMP reviewed// complex medical decision making leading to order for AutoCPAP / close interval follow up         EMR Dragon/Transcription disclaimer:   Much of this encounter note is an electronic transcription/translation of spoken language to printed text. The electronic translation of spoken language may permit erroneous, or at times, nonsensical words or phrases to be inadvertently transcribed; Although I have reviewed the note for such errors, some may still exist.       NPI #: [id number]    Radha Arroyo,   Sleep Medicine  [facility]  11/07/24

## 2024-11-21 ENCOUNTER — TELEPHONE (OUTPATIENT)
Dept: SLEEP MEDICINE | Facility: HOSPITAL | Age: 84
End: 2024-11-21
Payer: MEDICARE

## 2024-11-21 NOTE — TELEPHONE ENCOUNTER
Spoke with Pt this afternoon.  DME company has been trying to reach her to set up her sleep therapy. Pt was given the phone number to call the DME. I also  emailed the DME Co ( [employer]) to inform them that patient needs to start her therapy and to give her a call.

## 2024-12-02 ENCOUNTER — OFFICE VISIT (OUTPATIENT)
Dept: FAMILY MEDICINE CLINIC | Facility: CLINIC | Age: 84
End: 2024-12-02
Payer: MEDICARE

## 2024-12-02 VITALS
HEIGHT: 63 IN | SYSTOLIC BLOOD PRESSURE: 114 MMHG | WEIGHT: 191.2 LBS | TEMPERATURE: 97.4 F | DIASTOLIC BLOOD PRESSURE: 62 MMHG | OXYGEN SATURATION: 98 % | HEART RATE: 72 BPM | BODY MASS INDEX: 33.88 KG/M2

## 2024-12-02 DIAGNOSIS — F51.04 CHRONIC INSOMNIA: ICD-10-CM

## 2024-12-02 DIAGNOSIS — G62.9 PERIPHERAL POLYNEUROPATHY: Primary | ICD-10-CM

## 2024-12-02 DIAGNOSIS — N39.0 CHRONIC UTI: ICD-10-CM

## 2024-12-02 PROCEDURE — 99214 OFFICE O/P EST MOD 30 MIN: CPT | Performed by: FAMILY MEDICINE

## 2024-12-02 PROCEDURE — 3078F DIAST BP <80 MM HG: CPT | Performed by: FAMILY MEDICINE

## 2024-12-02 PROCEDURE — 1126F AMNT PAIN NOTED NONE PRSNT: CPT | Performed by: FAMILY MEDICINE

## 2024-12-02 PROCEDURE — 3074F SYST BP LT 130 MM HG: CPT | Performed by: FAMILY MEDICINE

## 2024-12-02 RX ORDER — GABAPENTIN 100 MG/1
100 CAPSULE ORAL 2 TIMES DAILY
Qty: 180 CAPSULE | Refills: 0 | Status: SHIPPED | OUTPATIENT
Start: 2024-12-02

## 2024-12-02 NOTE — PROGRESS NOTES
"Chief Complaint  Med Refill and Peripheral Neuropathy    Subjective        Annalee Melara presents to [facility]  History of Present Illness  History of Present Illness  The patient is an 84-year-old female who presents for a 1-month follow-up on neuropathy with gabapentin. She is accompanied by a friend today.    She reports that the low dose of gabapentin has been beneficial in managing her neuropathy, reducing the burning and stinging sensations. She is currently taking gabapentin twice daily. She reports no dizziness, confusion, or falls. However, she continues to struggle with sleep issues.    She also mentions a recent urinary tract infection (UTI) for which she sought treatment at an urgent care facility. She is currently on Keflex and reports no bladder-related symptoms.         Objective   Vital Signs:  /62   Pulse 72   Temp 97.4 °F (36.3 °C)   Ht 160 cm (63\")   Wt 86.7 kg (191 lb 3.2 oz)   SpO2 98%   BMI 33.87 kg/m²   Estimated body mass index is 33.87 kg/m² as calculated from the following:    Height as of this encounter: 160 cm (63\").    Weight as of this encounter: 86.7 kg (191 lb 3.2 oz).               Physical Exam   Physical Exam  Patient is alert, in no acute distress.       Result Review :          Results                Assessment and Plan     There are no diagnoses linked to this encounter.  Assessment & Plan  1. Neuropathy.  She reports significant improvement in her neuropathy symptoms with the current gabapentin regimen. She is currently taking gabapentin 100 mg twice a day. The prescription will be adjusted to reflect this dosage. She is advised to report any worsening of her neuropathy symptoms. No additional refills are required at this time.    2. Sleep Apnea.  She has not been sleeping better at night. She plans to get a CPAP machine tomorrow.  Take sure to keep follow-up with sleep specialty.  3. Urinary Tract Infection (UTI).  She had a low " colony count UTI treated at urgent care, which was not significant. She reports no current bladder symptoms and is continuing her Keflex.    4. Prediabetes.  Her prediabetes condition has shown improvement. She is advised to stay away from sugar, especially with the holidays coming.    Follow-up  Patient will return in 3 months for follow up.            Follow Up     No follow-ups on file.  Patient was given instructions and counseling regarding her condition or for health maintenance advice. Please see specific information pulled into the AVS if appropriate.    Patient or patient representative verbalized consent for the use of Ambient Listening during the visit with  Meredith Lea Kehrer, MD for chart documentation. 12/2/2024  15:41 EST

## 2024-12-10 DIAGNOSIS — I26.99 PULMONARY EMBOLISM, UNSPECIFIED CHRONICITY, UNSPECIFIED PULMONARY EMBOLISM TYPE, UNSPECIFIED WHETHER ACUTE COR PULMONALE PRESENT: ICD-10-CM

## 2024-12-10 RX ORDER — RIVAROXABAN 20 MG/1
TABLET, FILM COATED ORAL
Qty: 90 TABLET | Refills: 0 | Status: SHIPPED | OUTPATIENT
Start: 2024-12-10

## 2024-12-10 NOTE — TELEPHONE ENCOUNTER
Rx Refill Note  Requested Prescriptions     Pending Prescriptions Disp Refills    Xarelto 20 MG tablet [Pharmacy Med Name: Xarelto 20 MG Oral Tablet] 90 tablet 0     Sig: Take 1 tablet by mouth once daily      Last office visit with prescribing clinician: 12/2/2024   Last telemedicine visit with prescribing clinician: Visit date not found   Next office visit with prescribing clinician: 3/3/2025                         Would you like a call back once the refill request has been completed: [] Yes [] No    If the office needs to give you a call back, can they leave a voicemail: [] Yes [] No    Gayle Leonard MA  12/10/24, 12:38 EST

## 2024-12-17 RX ORDER — OMEPRAZOLE 40 MG/1
CAPSULE, DELAYED RELEASE ORAL
Qty: 90 CAPSULE | Refills: 0 | Status: SHIPPED | OUTPATIENT
Start: 2024-12-17

## 2025-02-06 ENCOUNTER — OFFICE VISIT (OUTPATIENT)
Dept: SLEEP MEDICINE | Facility: HOSPITAL | Age: 85
End: 2025-02-06
Payer: MEDICARE

## 2025-02-06 VITALS
HEART RATE: 74 BPM | DIASTOLIC BLOOD PRESSURE: 77 MMHG | HEIGHT: 63 IN | SYSTOLIC BLOOD PRESSURE: 133 MMHG | BODY MASS INDEX: 32.78 KG/M2 | WEIGHT: 185 LBS | OXYGEN SATURATION: 96 %

## 2025-02-06 DIAGNOSIS — F99 INAPPROPRIATE BEHAVIOR: ICD-10-CM

## 2025-02-06 DIAGNOSIS — Z79.01 LONG TERM (CURRENT) USE OF ANTICOAGULANTS: ICD-10-CM

## 2025-02-06 DIAGNOSIS — Z91.81 PERSONAL HISTORY OF FALL: ICD-10-CM

## 2025-02-06 DIAGNOSIS — G47.33 OBSTRUCTIVE SLEEP APNEA, ADULT: Primary | ICD-10-CM

## 2025-02-06 DIAGNOSIS — F03.90 DEMENTIA, UNSPECIFIED DEMENTIA SEVERITY, UNSPECIFIED DEMENTIA TYPE, UNSPECIFIED WHETHER BEHAVIORAL, PSYCHOTIC, OR MOOD DISTURBANCE OR ANXIETY: ICD-10-CM

## 2025-02-06 DIAGNOSIS — F32.A DEPRESSION, UNSPECIFIED DEPRESSION TYPE: ICD-10-CM

## 2025-02-06 DIAGNOSIS — R26.2 AMBULATORY DYSFUNCTION: ICD-10-CM

## 2025-02-06 DIAGNOSIS — F51.04 CHRONIC INSOMNIA: ICD-10-CM

## 2025-02-06 DIAGNOSIS — N39.0 CHRONIC UTI: ICD-10-CM

## 2025-02-06 DIAGNOSIS — R45.1 AGITATED: ICD-10-CM

## 2025-02-06 DIAGNOSIS — E66.811 CLASS 1 OBESITY WITH SERIOUS COMORBIDITY AND BODY MASS INDEX (BMI) OF 32.0 TO 32.9 IN ADULT, UNSPECIFIED OBESITY TYPE: ICD-10-CM

## 2025-02-06 PROCEDURE — G0463 HOSPITAL OUTPT CLINIC VISIT: HCPCS

## 2025-02-06 NOTE — PROGRESS NOTES
[facility]  [address]  [address]  [place] [address]  Phone   Fax     SLEEP CLINIC FOLLOW UP PROGRESS NOTE.    Annalee Melara  [id number]   1940  84 y.o.  female      PCP: Kehrer, Meredith Lea, MD      Date of visit: 2/6/2025    Chief Complaint   Patient presents with    Sleep Apnea     Chronic insomnia       Medications and allergies are reviewed by me and documented in the encounter.     SOCIAL (habits pertaining to sleep medicine)  History tobacco use:No   History of alcohol use: 0 per week  Caffeine use: refuses to quantify states seldom    Historians in today's encounter: Patient and  Esther (her family friend/caregiver)   Prior to the onset of the encounter I made sure that the patient provided verbal consent to me to discuss all of the patient's medical information to include any sensitive information/topics in front of the above noted individual also in the room. The patient insisted the above individual stay in the room for entire the encounter to completion.       HPI:  This is a 84 y.o. 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   Here for management of obstructive sleep apnea (AHI 8.1/hr on 7/3/2024 PSG).. Patient is using positive airway pressure therapy and the symptoms of sleep apnea have improved significantly on the therapy. She refuses to  quantify her bed time and her rise time .  The patient wakes up 1 time(s) during the night and has no problem going back to sleep.  Feels refreshed after waking up.     Overall patient's Impression of their PAP therapy is: No air pressure issues  No issues with FFM brand unspecified covers the bridge of her nose and her mouth  She's doing very well with compliance  Her AHI is at goal        Compliance data as reviewed by me with patient and her caregiver in room today with independent conclusions noted below:  Date range 12/4/2024 - 2/1/2025  Overall use 85% - good  4-hour beatriz 78% - meeting compliance requirements from insurance/DME perspective  Average days used 5 hours 42 minutes  Device AirSense 11 AutoSet  Settings 5-10 cm H2O  EPR 1 full-time  95th percentile pressure 7.0 cm H2O  Maximum pressure 7.6 cm H2O  95th percentile leak is 18.8 LPM  AHI 0.4-at goal  DME: [employer]  Mask used: No issues with FFM brand unspecified covers the bridge of her nose and her mouth      -Chronic insomnia  She stays in the same chair she sleeps in throughout the day  TV when she can't sleep  Never seen CBTi - firmly refuses to see CBTi despite extensive counseling towards same  Appropriately weaned off lunesta after a prior hypnotic dependence   A history of frequent falls, current ambulatory dysfunction, continues on xarelto   She did not discuss doxepin with her PCP who is treating her depression with duloxetine   She has never seen psychiatry   She had a recent UTI finished antibiotics no dysuria and frequency no hematuria no abdominal pain  This is the first visit with me she hasn't had a Uti   She had a Uti night of her sleep study     -Depression she's on duloxetine 60 mg an SNRi   She's taking this medication at bed time and she has insomnia symptoms  This medication is prescribed by her PCP  She has never seen psychiatry   She is open to seeing psychiatry    -Obesity  Body mass index is 32.78 kg/m².  Wt Readings from Last 3  "Encounters:   02/06/25 83.9 kg (185 lb)   12/02/24 86.7 kg (191 lb 3.2 oz)   11/15/24 81.6 kg (179 lb 14.3 oz)         REVIEW OF SYSTEMS:   Is negative unless otherwise noted in HPI  [place] Sleepiness Scale :Total score: 1     Disclaimer History: The above history is based on this sleep physician's in room encounter with the patient. Pre encounter self administered questionnaires are taken into consideration and discussed with patient for any discordance. The above documentation by this sleep physician is the most accurate clinical information determined by in room sleep physician encounter with patient.     PHYSICAL EXAMINATION:  Vitals:    02/06/25 1300   BP: 133/77   Pulse: 74   SpO2: 96%   Weight: 83.9 kg (185 lb)   Height: 160 cm (62.99\")    Body mass index is 32.78 kg/m².   CONSTITUTIONAL: Well appearing, in no overt pain or respiratory distress   NOSE: No septal defect  RESP SYSTEM:  No overt respiratory distress, speaks in clear sentences without dyspnea, no accessory muscle use  CARDIOVASULAR: No edema noted  NEURO: Oriented x 3, ambulates with cane, her gait is unsteady  Psychiatric: Affect is flat.  she is guarded to counseling of stimulus control therapy. She is very agitated at times she does start screaming at me and slamming her cane on the clinic floor stating that the sleep apnea therapy is not helping her fall asleep. I had to pause the encounter to reorient her and calm her down with this inappropriate behavior, once she was calm:  I made her well aware that sleep apnea therapy is not designed to make a patient fall asleep nor will be the only form of treatment for her chronic insomnia (See A&P for further details.    Labs reviewed  -11/15/2024  Urinalysis  Trace blood small leukocyte esterase  Urine culture that was sent results Klebsiella pneumoniae  Comment: Multi-Drug Resistant Organism  25,000-50,000 colony forming units per mL  Cefazolin <=4 ug/mL  Cefazolin with an NBA <=16 predicts " "susceptibility to the oral agents  cefaclor, cefdinir, cefpodoxime, cefprozil, cefuroxime, cephalexin,  and loracarbef when used for therapy of uncomplicated urinary tract  infections due to E. coli, Klebsiella pneumoniae, and Proteus  mirabilis.       Records reviewed  -12/7/2024 refills for Xarelto 20 mg 90 tabs  -12/2/2024 office visit PCP UTI again treated by urgent care on keflex  -11/15/2024 Urgent Care Visit  -11/13/2024 [place] Office visit chronic lower back pain history of lumbar fractures  -11/7/2024 sleep clinic visit  Hx of frequent falls  Neuro exam:  Oriented x3, ambulates with a osei  Physicatric component of physical exam  Psychiatric: Affect is flat, she is very guarded despite extensive counseling provided to her in room today.  She also appears agitated at times and at times she does raise her voice at me, I did  her that she can ask me as many questions as she wants that we have a lot to review, she does become more calm when I did  her but continues to remain very guarded, she has also threatened to bite my finger when I request for her to open her mouth so I can visualize her Mallampati at which point her caregiver Esther replies to me \"she's not joking\"  Counseled follow up with PCP for urology UTI that visit, UTI night of sleep study - see above UTI again after visit and before short interval follow up with me again today  On cymbalta counseled to follow up with PCP to discuss doxepin or request psychiatry referral -I will not allow sleep medicine to manage mood disorder medications .    Diagnostics reviewed  - 11/13/2024 XR L-Spine  Radiologist's impression:  1. Mild-to-moderate degenerative disc disease.   2. Mild levoscoliosis.   3. Grade 1 anterolisthesis of L4 and L5 secondary to facet disease       Compliance data as reviewed by me with patient and her caregiver in room today with independent conclusions noted below:  Date range 12/4/2024 - 2/1/2025  Overall use 85% - " good  4-hour beatriz 78% - meeting compliance requirements from insurance/DME perspective  Average days used 5 hours 42 minutes  Device AirSense 11 AutoSet  Settings 5-10 cm H2O  EPR 1 full-time  95th percentile pressure 7.0 cm H2O  Maximum pressure 7.6 cm H2O  95th percentile leak is 18.8 LPM  AHI 0.4-at goal  DME: [employer]  Mask used: No issues with FFM brand unspecified covers the bridge of her nose and her mouth      ASSESSMENT AND PLAN:  Obstructive sleep apnea ( G 47.33).    -Specific Changes made by me today:  I. After review of compliance data, in visit clinical correlation, and through shared decision making: will not make any changes to PAP therapy settings  II.  Her AHI is at goal. Her compliance is good. Made her aware that PAP therapy alone for treatment of sleep apnea will not cure her insomnia - see below.   III. Counseled patient to follow-up with me in 1 year for therapy review.  Counseled may request sooner follow-up to sleep clinic anytime the patient feels necessary.  The symptoms of sleep apnea have improved with the device and the treatment.  Patient's compliance with the device is excellent for treatment of sleep apnea.  I have independently reviewed the smart card down load and discussed with the patient the download data and encouarged the patient to continue to use the device.The residual AHI is acceptable. The device is benefiting the patient and the device is medically necessary.  Without proper control of sleep apnea and good compliance there is a increased risk for hypertension, diabetes mellitus and nonrestorative sleep with hypersomnia which can increase risk for motor vehicle accidents.  Untreated sleep apnea is also a risk factor for development of atrial fibrillation, pulmonary hypertension, insulin resistance and stroke. The patient is also instructed to get the supplies from the [employer] company and and change them on a regular basis.  A prescription for supplies has been sent to the [employer]  company.  I have also discussed the good sleep hygiene habits and adequate amount of sleep needed for good health.  Obesity, with BMI is Body mass index is 32.78 kg/m².. Counseled weight loss will be beneficial for reduction in severity of sleep apnea, healthy diet/exercise to achieve same, follow up with primary care physician for serial monitoring and to further guide management.  Insomnia (Chronic)/History of falls/Ambulatory dysfunction/Chronic UTI(s)/Dementia/depression/Agitation/Inappropriate behavior  The goal of chronic insomnia is to treat the underlying cause:  -I am addressing sleep disordered breathing for her with PAP therapy  -Stimulus control counseling is AASM Standard Recommendation regarding insomnia: reinforced with her stimulus control counseling - guarded to this counseling  Maintain regular sleep-wake schedule  Avoiding napping   Use bed only for nocturnal sleep or intimacy   Attempt to fall asleep ONLY when sleepy   If unable to fall asleep within 20 minutes leave the bed and pursue a relaxing boring activity in a dark or dimly lit area, then return to bed only when sleepy (This STEP MUST BE REPEATED as often as necessary with emphasis to only attempt to fall asleep in bed when sleepy)   -CBTi is first line and should accompany any medical management   Her insomnia will be a failure with her multiple inadequate circumstances of sleep as long as she continues to refuse CBTi  She firmly refused CBTi referral   -She has a personal history of falls, she on medical necessary anticoagulant therapy, there is also a concern of dementia for her which she is on treatment for, she also has a history of long term hypnotic dependence which she was appropriately weaned off by her PCP: The risks of centrally acting hypnotics such as zolpidem/eszopiclone do not outweigh the benefits for this geriatric patient with unsteady gait in sleep clinic with multiple reasons to avoid centrally acting hypnotic. She also  persists with multiple inadequate circumstances of sleep that borderlines precluding a diagnosis of chronic insomnia. Ideally non-medical therapy with CBTi which is first line would be best for her which I have reinforced with her and her caregiver multiple times. She firmly refuses referral despite extensive counseling. If I see any sleep physicians inappropriately starting hypnotic therapy, then I will be evaluating for inappropriate treatment from sleep physicians and identifying same as long as she continues to have follow ups with me in future sleep clinics.  -Chronic UTI(s) contributing to sleep disturbances: management at discretion of PCP I recommend Urology/Gynecology referral to help guide. Ultimately at the discretion of her PCP.  -I am going to refer her to psychiatry for depression/agitation: I have a goal of care I have specified to psychiatry for selection of sedating antidepressant therapy ultimately at the discretion of psychiatry. I would not allow sleep medicine to start antidepressant therapy or manage antidepressant therapy in a patient with comorbid mood disorder. She is taking cymbatla qhs this is an SNRi which may contribute to sleep disturbances currently managed by her PCP. I made her well aware not to change any prescriptions managed by her PCP unless directed by her PCP to do same. I requested last visit to address this with her PCP, she did not. I also reviewed PCP notes this was not addressed. At this point it is medically necessary and appropriate to refer to psychiatry for further direction and evaluation that will be greatly appreciate as her current management may be contributing to sleep disturbances. I provided the patient with a referral today to Dr. Sandy Best the [facility] psychiatry listed by EMR for further evaluation and direction which will grealty be appreciated. Doxepin would have been a good option for her. The 6mg dosage of doxepin is FDA approved for  sleep, but very expensive with medicare. FDA has not approved 10 mg dosage for sleep. I would not allow sleep physicians to start/augment medications which may effect her mood disorders with underlying mood disorder history. Psychiatry referral above for selection of sedating antidepressant therapy if determined appropriate at discretion of psychiatry. With her inappropriate behavior and agitation persisting I have addressed now x2 visits. If she continues agitation/inappropriate behavior with me I will be reaching out to risk management for practice dismissal especially if safety of me or my sleep staff becomes an issue. She was ultimately be able to be calmed down with counseling holding off on same for now.  I will try to work with her as best as she allows.        Follow up in 1 year . Patient's questions were answered.    I gave the patient and her caregiver in room MULTIPLE OPPORTUNITIES TO ASK ME QUESTIONS TODAY. They verbalized with satisfaction with answers I gave. They did not have any other questions for me before leaving sleep clinic when asked again at end of visit if they have questions about anything we have discussed today or related to sleep disorders.     Time based visit 40 minutes: to include in room history/exam/extensive counseling/review of plan with patient/SAME DAY: review of medications/review of labs/review of diagnostics/independent conclusions drawn from prior diagnostics - AHI at goal - compliance good from sleep disorders perspective I have nothing to add to her care at this time she is optimized for annual follow up/ review of prior medical records / complex medical decision making leading to order for PAP supplies / referral place to psychiatry       EMR Dragon/Transcription disclaimer:   Much of this encounter note is an electronic transcription/translation of spoken language to printed text. The electronic translation of spoken language may permit erroneous, or at times,  nonsensical words or phrases to be inadvertently transcribed; Although I have reviewed the note for such errors, some may still exist.       NPI #: [id number]    Radha Arroyo,   Sleep Medicine  [facility]  02/06/25

## 2025-02-26 ENCOUNTER — TELEPHONE (OUTPATIENT)
Dept: FAMILY MEDICINE CLINIC | Facility: CLINIC | Age: 85
End: 2025-02-26
Payer: MEDICARE

## 2025-03-03 ENCOUNTER — OFFICE VISIT (OUTPATIENT)
Dept: FAMILY MEDICINE CLINIC | Facility: CLINIC | Age: 85
End: 2025-03-03
Payer: MEDICARE

## 2025-03-03 VITALS
WEIGHT: 193.6 LBS | HEART RATE: 97 BPM | OXYGEN SATURATION: 98 % | BODY MASS INDEX: 34.3 KG/M2 | HEIGHT: 63 IN | TEMPERATURE: 98.2 F | SYSTOLIC BLOOD PRESSURE: 124 MMHG | DIASTOLIC BLOOD PRESSURE: 68 MMHG

## 2025-03-03 DIAGNOSIS — N39.0 CHRONIC UTI: ICD-10-CM

## 2025-03-03 DIAGNOSIS — I10 ESSENTIAL HYPERTENSION: Primary | ICD-10-CM

## 2025-03-03 DIAGNOSIS — M48.061 SPINAL STENOSIS OF LUMBAR REGION WITHOUT NEUROGENIC CLAUDICATION: ICD-10-CM

## 2025-03-03 DIAGNOSIS — R73.03 PREDIABETES: ICD-10-CM

## 2025-03-03 DIAGNOSIS — F51.04 CHRONIC INSOMNIA: ICD-10-CM

## 2025-03-03 DIAGNOSIS — G62.9 PERIPHERAL POLYNEUROPATHY: ICD-10-CM

## 2025-03-03 DIAGNOSIS — I26.99 PULMONARY EMBOLISM, UNSPECIFIED CHRONICITY, UNSPECIFIED PULMONARY EMBOLISM TYPE, UNSPECIFIED WHETHER ACUTE COR PULMONALE PRESENT: ICD-10-CM

## 2025-03-03 PROCEDURE — 1126F AMNT PAIN NOTED NONE PRSNT: CPT | Performed by: FAMILY MEDICINE

## 2025-03-03 PROCEDURE — 3074F SYST BP LT 130 MM HG: CPT | Performed by: FAMILY MEDICINE

## 2025-03-03 PROCEDURE — 3078F DIAST BP <80 MM HG: CPT | Performed by: FAMILY MEDICINE

## 2025-03-03 PROCEDURE — 99214 OFFICE O/P EST MOD 30 MIN: CPT | Performed by: FAMILY MEDICINE

## 2025-03-03 RX ORDER — ZOLPIDEM TARTRATE 10 MG/1
TABLET ORAL
COMMUNITY
Start: 2025-02-25

## 2025-03-03 NOTE — PROGRESS NOTES
Chief Complaint  Med Refill, Hypertension, and Hyperlipidemia    Subjective        Annalee Melara presents to [facility]  History of Present Illness  History of Present Illness  The patient is an 84-year-old female who presents for a 3-month follow-up on multiple medical problems. She is accompanied by her .    She has been experiencing sleep disturbances for over many years. Despite undergoing a sleep study and using a CPAP machine, her sleep issues persist. She has consulted with a sleep specialist and was referred to another physician, whom she has not yet seen. She has discontinued the use of eszopiclone since July 2024. She has been prescribed Ambien by her former primary care physician, Dr. Gaspar, and is required to visit him every 3 months for a new prescription.  They expect to her many times over the past couple years that have been taking care of her that I will not prescribe this medication.  Ambien is not a good choice for her with her history of falls with injury and cognitive impairment from early dementia.  She does see a neurologist for this.    She has previously been on gabapentin, which was effective in managing her pain. Her neuropathy symptoms remain stable, and she only requires 1 to 2 pills as needed. She is [facility] with whom she has an appointment scheduled for this month. She has not consulted with Dr. Huffman since September or October 2024.    She has not had a urinary tract infection since before Christmas and reports no current symptoms. She continues to take Keflex daily and will have to do that for the rest of her life.    She reports back pain and has consulted with an orthopedic specialist due to 3 fractured vertebrae. No pain medication, exercises, belt, or therapy were recommended during her visit in November 2024.    She has been managing well in cold weather conditions. She has  "received her influenza vaccine and does not have frequent exposure to unvaccinated children. She is uncertain about her measles vaccination status but recalls receiving some vaccinations during her school years. She has a history of mumps.    MEDICATIONS  Current: Ambien, CPAP, gabapentin, Namenda, loratadine, Keflex  Discontinued: eszopiclone    IMMUNIZATIONS  She received her influenza vaccine.       Objective   Vital Signs:  /68   Pulse 97   Temp 98.2 °F (36.8 °C)   Ht 160 cm (63\")   Wt 87.8 kg (193 lb 9.6 oz)   SpO2 98%   BMI 34.29 kg/m²   Estimated body mass index is 34.29 kg/m² as calculated from the following:    Height as of this encounter: 160 cm (63\").    Weight as of this encounter: 87.8 kg (193 lb 9.6 oz).               Physical Exam   Physical Exam  Lungs are clear.  Heart rhythm is regular.  Abdomen is soft.       Result Review :          Results  Laboratory Studies  Labs done in October 2024 were all normal except for prediabetes.              Assessment and Plan     Diagnoses and all orders for this visit:    1. Essential hypertension (Primary)  -     Hemoglobin A1c  -     CBC & Differential  -     Comprehensive Metabolic Panel    2. Peripheral polyneuropathy    3. Pulmonary embolism, unspecified chronicity, unspecified pulmonary embolism type, unspecified whether acute cor pulmonale present    4. Chronic insomnia    5. Chronic UTI    6. Prediabetes  -     Hemoglobin A1c  -     CBC & Differential  -     Comprehensive Metabolic Panel    7. Spinal stenosis of lumbar region without neurogenic claudication      Assessment & Plan  1. Insomnia.  I have again reiterated to the patient that not support her use of zolpidem for sleep.  She was advised to have one family physician manage her sedating medications, including gabapentin, to ensure coordinated care.  I will reach out to Dr. Gaspar.  I discussed with the patient about terminating her patient physician relationship as well if I am not " satisfied with the outcome.  I am not willing to take that risk. Prediabetes.  Her last labs in October 2024 indicated prediabetes.    3. Urinary tract infection.  She has not had a urinary tract infection since before Christmas and reports no current symptoms. The previous infection had a low colony count and was not a full infection. She continues to take Keflex daily and will need to do so for the rest of her life.    4. Neuropathy.  Her neuropathy symptoms have remained stable. She continues to take gabapentin as needed, usually 1 to 2 pills. She was advised to keep her gabapentin intake consistent to reduce the risk of falling.    5. Back pain.  She has been experiencing back pain due to 3 broken vertebrae. She saw an orthopedic specialist in November 2024, who did not prescribe any pain medication or recommend therapy.    6. Health maintenance.  She has received her influenza vaccine and does not have frequent exposure to unvaccinated children. She is uncertain about her measles vaccination status but recalls receiving some vaccinations during her school years. She has a history of mumps.            Follow Up     No follow-ups on file.  Patient was given instructions and counseling regarding her condition or for health maintenance advice. Please see specific information pulled into the AVS if appropriate.    Patient or patient representative verbalized consent for the use of Ambient Listening during the visit with  Meredith Lea Kehrer, MD for chart documentation. 3/3/2025  15:26 EST

## 2025-03-04 LAB
ALBUMIN SERPL-MCNC: 4.3 G/DL (ref 3.7–4.7)
ALP SERPL-CCNC: 155 IU/L (ref 44–121)
ALT SERPL-CCNC: 15 IU/L (ref 0–32)
AST SERPL-CCNC: 21 IU/L (ref 0–40)
BASOPHILS # BLD AUTO: 0.1 X10E3/UL (ref 0–0.2)
BASOPHILS NFR BLD AUTO: 1 %
BILIRUB SERPL-MCNC: 0.4 MG/DL (ref 0–1.2)
BUN SERPL-MCNC: 15 MG/DL (ref 8–27)
BUN/CREAT SERPL: 16 (ref 12–28)
CALCIUM SERPL-MCNC: 9.3 MG/DL (ref 8.7–10.3)
CHLORIDE SERPL-SCNC: 102 MMOL/L (ref 96–106)
CO2 SERPL-SCNC: 19 MMOL/L (ref 20–29)
CREAT SERPL-MCNC: 0.94 MG/DL (ref 0.57–1)
EGFRCR SERPLBLD CKD-EPI 2021: 60 ML/MIN/1.73
EOSINOPHIL # BLD AUTO: 0.2 X10E3/UL (ref 0–0.4)
EOSINOPHIL NFR BLD AUTO: 2 %
ERYTHROCYTE [DISTWIDTH] IN BLOOD BY AUTOMATED COUNT: 16.6 % (ref 11.7–15.4)
GLOBULIN SER CALC-MCNC: 2.7 G/DL (ref 1.5–4.5)
GLUCOSE SERPL-MCNC: 127 MG/DL (ref 70–99)
HBA1C MFR BLD: 6 % (ref 4.8–5.6)
HCT VFR BLD AUTO: 32.6 % (ref 34–46.6)
HGB BLD-MCNC: 9.7 G/DL (ref 11.1–15.9)
IMM GRANULOCYTES # BLD AUTO: 0 X10E3/UL (ref 0–0.1)
IMM GRANULOCYTES NFR BLD AUTO: 0 %
LYMPHOCYTES # BLD AUTO: 1.6 X10E3/UL (ref 0.7–3.1)
LYMPHOCYTES NFR BLD AUTO: 16 %
MCH RBC QN AUTO: 21.2 PG (ref 26.6–33)
MCHC RBC AUTO-ENTMCNC: 29.8 G/DL (ref 31.5–35.7)
MCV RBC AUTO: 71 FL (ref 79–97)
MONOCYTES # BLD AUTO: 1 X10E3/UL (ref 0.1–0.9)
MONOCYTES NFR BLD AUTO: 9 %
NEUTROPHILS # BLD AUTO: 7.4 X10E3/UL (ref 1.4–7)
NEUTROPHILS NFR BLD AUTO: 72 %
PLATELET # BLD AUTO: 448 X10E3/UL (ref 150–450)
POTASSIUM SERPL-SCNC: 5.2 MMOL/L (ref 3.5–5.2)
PROT SERPL-MCNC: 7 G/DL (ref 6–8.5)
RBC # BLD AUTO: 4.58 X10E6/UL (ref 3.77–5.28)
SODIUM SERPL-SCNC: 137 MMOL/L (ref 134–144)
WBC # BLD AUTO: 10.4 X10E3/UL (ref 3.4–10.8)

## 2025-03-06 DIAGNOSIS — D50.8 OTHER IRON DEFICIENCY ANEMIA: Primary | ICD-10-CM

## 2025-03-06 RX ORDER — FERROUS SULFATE 325(65) MG
325 TABLET ORAL
Qty: 30 TABLET | Refills: 1 | Status: SHIPPED | OUTPATIENT
Start: 2025-03-06

## 2025-03-10 DIAGNOSIS — N39.0 CHRONIC UTI: ICD-10-CM

## 2025-03-10 DIAGNOSIS — I26.99 PULMONARY EMBOLISM, UNSPECIFIED CHRONICITY, UNSPECIFIED PULMONARY EMBOLISM TYPE, UNSPECIFIED WHETHER ACUTE COR PULMONALE PRESENT: ICD-10-CM

## 2025-03-10 RX ORDER — RIVAROXABAN 20 MG/1
20 TABLET, FILM COATED ORAL DAILY
Qty: 90 TABLET | Refills: 0 | Status: SHIPPED | OUTPATIENT
Start: 2025-03-10

## 2025-03-10 RX ORDER — NITROFURANTOIN 25; 75 MG/1; MG/1
100 CAPSULE ORAL DAILY
Qty: 90 CAPSULE | Refills: 0 | Status: SHIPPED | OUTPATIENT
Start: 2025-03-10

## 2025-03-10 NOTE — TELEPHONE ENCOUNTER
Rx Refill Note  Requested Prescriptions     Pending Prescriptions Disp Refills    Xarelto 20 MG tablet [Pharmacy Med Name: Xarelto 20 MG Oral Tablet] 90 tablet 0     Sig: Take 1 tablet by mouth once daily      Last office visit with prescribing clinician: 3/3/2025   Last telemedicine visit with prescribing clinician: Visit date not found   Next office visit with prescribing clinician: 6/4/2025                         Would you like a call back once the refill request has been completed: [] Yes [] No    If the office needs to give you a call back, can they leave a voicemail: [] Yes [] No    Gayle Marsh MA  03/10/25, 14:16 EDT

## 2025-03-10 NOTE — TELEPHONE ENCOUNTER
Rx Refill Note  Requested Prescriptions     Pending Prescriptions Disp Refills    nitrofurantoin, macrocrystal-monohydrate, (MACROBID) 100 MG capsule [Pharmacy Med Name: Nitrofurantoin Monohyd Macro 100 MG Oral Capsule] 90 capsule 0     Sig: Take 1 capsule by mouth once daily      Last office visit with prescribing clinician: 3/3/2025   Last telemedicine visit with prescribing clinician: Visit date not found   Next office visit with prescribing clinician: 6/4/2025                         Would you like a call back once the refill request has been completed: [] Yes [] No    If the office needs to give you a call back, can they leave a voicemail: [] Yes [] No    Gayle Leonard MA  03/10/25, 13:50 EDT

## 2025-03-17 RX ORDER — OMEPRAZOLE 40 MG/1
40 CAPSULE, DELAYED RELEASE ORAL EVERY MORNING
Qty: 90 CAPSULE | Refills: 1 | Status: SHIPPED | OUTPATIENT
Start: 2025-03-17

## 2025-03-17 NOTE — TELEPHONE ENCOUNTER
Rx Refill Note  Requested Prescriptions     Pending Prescriptions Disp Refills    omeprazole (priLOSEC) 40 MG capsule [Pharmacy Med Name: Omeprazole 40 MG Oral Capsule Delayed Release] 90 capsule 1     Sig: Take 1 capsule by mouth once daily in the morning      Last office visit with prescribing clinician: 3/3/2025   Last telemedicine visit with prescribing clinician: Visit date not found   Next office visit with prescribing clinician: 6/4/2025                         Would you like a call back once the refill request has been completed: [] Yes [] No    If the office needs to give you a call back, can they leave a voicemail: [] Yes [] No    Gayle Marsh MA  03/17/25, 08:27 EDT

## 2025-04-09 DIAGNOSIS — F32.A DEPRESSION, UNSPECIFIED DEPRESSION TYPE: ICD-10-CM

## 2025-04-09 RX ORDER — DULOXETIN HYDROCHLORIDE 60 MG/1
60 CAPSULE, DELAYED RELEASE ORAL DAILY
Qty: 90 CAPSULE | Refills: 0 | Status: SHIPPED | OUTPATIENT
Start: 2025-04-09

## 2025-04-09 NOTE — TELEPHONE ENCOUNTER
Rx Refill Note  Requested Prescriptions     Pending Prescriptions Disp Refills    DULoxetine (CYMBALTA) 60 MG capsule [Pharmacy Med Name: DULoxetine HCl 60 MG Oral Capsule Delayed Release Particles] 90 capsule 0     Sig: Take 1 capsule by mouth once daily      Last office visit with prescribing clinician: 3/3/2025   Last telemedicine visit with prescribing clinician: Visit date not found   Next office visit with prescribing clinician: 6/4/2025                         Would you like a call back once the refill request has been completed: [] Yes [] No    If the office needs to give you a call back, can they leave a voicemail: [] Yes [] No    Gayle Leonard MA  04/09/25, 14:00 EDT    Denies

## 2025-06-01 DIAGNOSIS — I10 ESSENTIAL HYPERTENSION: ICD-10-CM

## 2025-06-02 RX ORDER — LISINOPRIL 10 MG/1
10 TABLET ORAL DAILY
Qty: 90 TABLET | Refills: 1 | Status: SHIPPED | OUTPATIENT
Start: 2025-06-02

## 2025-06-02 NOTE — TELEPHONE ENCOUNTER
Rx Refill Note  Requested Prescriptions     Pending Prescriptions Disp Refills    lisinopril (PRINIVIL,ZESTRIL) 10 MG tablet [Pharmacy Med Name: Lisinopril 10 MG Oral Tablet] 90 tablet 0     Sig: Take 1 tablet by mouth once daily      Last office visit with prescribing clinician: 3/3/2025   Last telemedicine visit with prescribing clinician: Visit date not found   Next office visit with prescribing clinician: 6/3/2025                         Would you like a call back once the refill request has been completed: [] Yes [] No    If the office needs to give you a call back, can they leave a voicemail: [] Yes [] No    Gayle Marsh MA  06/02/25, 08:20 EDT

## 2025-06-03 ENCOUNTER — OFFICE VISIT (OUTPATIENT)
Dept: FAMILY MEDICINE CLINIC | Facility: CLINIC | Age: 85
End: 2025-06-03
Payer: MEDICARE

## 2025-06-03 VITALS
TEMPERATURE: 98.3 F | HEIGHT: 63 IN | WEIGHT: 198.8 LBS | DIASTOLIC BLOOD PRESSURE: 72 MMHG | HEART RATE: 71 BPM | SYSTOLIC BLOOD PRESSURE: 130 MMHG | OXYGEN SATURATION: 97 % | BODY MASS INDEX: 35.22 KG/M2

## 2025-06-03 DIAGNOSIS — R73.03 PREDIABETES: ICD-10-CM

## 2025-06-03 DIAGNOSIS — G62.9 PERIPHERAL POLYNEUROPATHY: ICD-10-CM

## 2025-06-03 DIAGNOSIS — H61.22 CERUMINOSIS, LEFT: ICD-10-CM

## 2025-06-03 DIAGNOSIS — D68.62 LUPUS ANTICOAGULANT SYNDROME: ICD-10-CM

## 2025-06-03 DIAGNOSIS — I10 ESSENTIAL HYPERTENSION: ICD-10-CM

## 2025-06-03 DIAGNOSIS — F32.A DEPRESSION, UNSPECIFIED DEPRESSION TYPE: ICD-10-CM

## 2025-06-03 DIAGNOSIS — Z00.00 MEDICARE ANNUAL WELLNESS VISIT, SUBSEQUENT: Primary | ICD-10-CM

## 2025-06-03 DIAGNOSIS — Z79.01 LONG TERM (CURRENT) USE OF ANTICOAGULANTS: ICD-10-CM

## 2025-06-03 DIAGNOSIS — Z79.899 HIGH RISK MEDICATION USE: ICD-10-CM

## 2025-06-03 DIAGNOSIS — Z91.81 AT HIGH RISK FOR FALLS: ICD-10-CM

## 2025-06-03 DIAGNOSIS — N39.0 CHRONIC UTI: ICD-10-CM

## 2025-06-03 DIAGNOSIS — G31.84 MINIMAL COGNITIVE IMPAIRMENT: ICD-10-CM

## 2025-06-03 RX ORDER — GABAPENTIN 100 MG/1
100 CAPSULE ORAL 2 TIMES DAILY
Qty: 180 CAPSULE | Refills: 0 | Status: SHIPPED | OUTPATIENT
Start: 2025-06-03

## 2025-06-03 NOTE — PROGRESS NOTES
The ABCs of the Annual Wellness Visit  Subsequent Medicare Wellness Visit    Subjective    Annalee Melara is a 84 y.o. female who presents for a Subsequent Medicare Wellness Visit.    The following portions of the patient's history were reviewed and   updated as appropriate: allergies, current medications, past family history, past medical history, past social history, past surgical history, and problem list.    Compared to one year ago, the patient feels her physical   health is worse.    Compared to one year ago, the patient feels her mental   health is the same.    Recent Hospitalizations:  She was not admitted to the hospital during the last year.       Current Medical Providers:  Patient Care Team:  Kehrer, Meredith Lea, MD as PCP - General (Family Medicine)    Outpatient Medications Prior to Visit   Medication Sig Dispense Refill   • Combigan 0.2-0.5 % ophthalmic solution      • DULoxetine (CYMBALTA) 60 MG capsule Take 1 capsule by mouth once daily 90 capsule 0   • ezetimibe (ZETIA) 10 MG tablet Take 1 tablet by mouth once daily 30 tablet 0   • galantamine ER (RAZADYNE ER) 8 MG 24 hr capsule Take 1 capsule by mouth Every 12 (Twelve) Hours.     • latanoprost (XALATAN) 0.005 % ophthalmic solution INSTILL 1 DROP INTO EACH EYE AT BEDTIME     • lisinopril (PRINIVIL,ZESTRIL) 10 MG tablet Take 1 tablet by mouth once daily 90 tablet 1   • memantine (NAMENDA) 10 MG tablet TAKE 1 TABLET BY MOUTH TWICE DAILY     • nitrofurantoin, macrocrystal-monohydrate, (MACROBID) 100 MG capsule Take 1 capsule by mouth once daily 90 capsule 0   • omeprazole (priLOSEC) 40 MG capsule Take 1 capsule by mouth once daily in the morning 90 capsule 1   • prednisoLONE acetate (PRED FORTE) 1 % ophthalmic suspension INSTILL 1 DROP INTO EACH EYE 4 TIMES DAILY THEN FOLLOW TAPER SCHEDULE     • Xarelto 20 MG tablet Take 1 tablet by mouth once daily 90 tablet 0   • zolpidem (AMBIEN) 10 MG tablet take 1 tablet by mouth at bedtime for 30 days     •  "ferrous sulfate 325 (65 FE) MG tablet Take 1 tablet by mouth Daily With Breakfast. 30 tablet 1   • gabapentin (NEURONTIN) 100 MG capsule Take 1 capsule by mouth 2 (Two) Times a Day. 180 capsule 0     No facility-administered medications prior to visit.       No opioid medication identified on active medication list. I have reviewed chart for other potential  high risk medication/s and harmful drug interactions in the elderly.        Aspirin is not on active medication list.  Aspirin use is not indicated based on review of current medical condition/s. Risk of harm outweighs potential benefits.  .    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  ACP discussion was held with the patient during this visit. Patient has an advance directive (not in EMR), copy requested.     Objective    Vitals:    06/03/25 1453   BP: 130/72   Pulse: 71   Temp: 98.3 °F (36.8 °C)   SpO2: 97%   Weight: 90.2 kg (198 lb 12.8 oz)   Height: 160 cm (63\")     Estimated body mass index is 35.22 kg/m² as calculated from the following:    Height as of this encounter: 160 cm (63\").    Weight as of this encounter: 90.2 kg (198 lb 12.8 oz).    BMI is >= 30 and <35. (Class " 1 Obesity). The following options were offered after discussion;: nutrition counseling/recommendations      Does the patient have evidence of cognitive impairment? Yes          HEALTH RISK ASSESSMENT    Smoking Status:  Social History     Tobacco Use   Smoking Status Never   Smokeless Tobacco Never     Alcohol Consumption:  Social History     Substance and Sexual Activity   Alcohol Use Not Currently     Fall Risk Screen:    [place] Fall Risk Assessment was completed, and patient is at MODERATE risk for falls. Assessment completed on:6/3/2025    Depression Screenin/3/2025     2:51 PM   PHQ-2/PHQ-9 Depression Screening   Little interest or pleasure in doing things Not at all   Feeling down, depressed, or hopeless Not at all   How difficult have these problems made it for you to do your work, take care of things at home, or get along with other people? Not difficult at all       Health Habits and Functional and Cognitive Screenin/3/2025     2:55 PM   Functional & Cognitive Status   Do you have difficulty preparing food and eating? Yes   Do you have difficulty bathing yourself, getting dressed or grooming yourself? Yes   Do you have difficulty using the toilet? Yes   Do you have difficulty moving around from place to place? Yes   Do you have trouble with steps or getting out of a bed or a chair? Yes   Current Diet Well Balanced Diet   Dental Exam Up to date   Eye Exam Up to date   Exercise (times per week) 0 times per week   Current Exercises Include No Regular Exercise   Do you need help using the phone?  No   Are you deaf or do you have serious difficulty hearing?  Yes   Do you need help to go to places out of walking distance? Yes   Do you need help shopping? Yes   Do you need help preparing meals?  Yes   Do you need help with housework?  Yes   Do you need help with laundry? Yes   Do you need help taking your medications? Yes   Do you need help managing money? No   Do you ever drive or ride in a car  without wearing a seat belt? No   Have you felt unusual stress, anger or loneliness in the last month? No   Who do you live with? Spouse   If you need help, do you have trouble finding someone available to you? No   Do you have difficulty concentrating, remembering or making decisions? No       Age-appropriate Screening Schedule:  Refer to the list below for future screening recommendations based on patient's age, sex and/or medical conditions. Orders for these recommended tests are listed in the plan section. The patient has been provided with a written plan.    Health Maintenance   Topic Date Due   • TDAP/TD VACCINES (1 - Tdap) Never done   • RSV Vaccine - Adults (1 - 1-dose 75+ series) Never done   • COVID-19 Vaccine (12 - 2024-25 season) 12/03/2025 (Originally 6/9/2025)   • INFLUENZA VACCINE  07/01/2025   • ANNUAL WELLNESS VISIT  06/03/2026   • Pneumococcal Vaccine 50+  Completed   • ZOSTER VACCINE  Completed   • LIPID PANEL  Discontinued   • DXA SCAN  Discontinued                  CMS Preventative Services Quick Reference  Risk Factors Identified During Encounter  Depression/Dysphoria: Current medication is effective, no change recommended  The above risks/problems have been discussed with the patient.  Pertinent information has been shared with the patient in the After Visit Summary.  An After Visit Summary and PPPS were made available to the patient.    Follow Up:   Next Medicare Wellness visit to be scheduled in 1 year.       Additional E&M Note during same encounter follows:  Patient has multiple medical problems which are significant and separately identifiable that require additional work above and beyond the Medicare Wellness Visit.      Chief Complaint  Medicare Wellness-subsequent, Hypertension, Hyperlipidemia, and Depression    Subjective        HPI  Annalee Melara is also being seen today for follow up.  History of Present Illness  The patient is an 84-year-old female who presents for a Medicare  "wellness visit and follow-up on multiple chronic medical problems. She is accompanied by her .    She has been adhering to her prescribed antihypertensive medication regimen without experiencing any associated chest pain or dyspnea. Her blood pressure is well controlled, although her weight has fluctuated slightly over the past few months.    She expresses a desire for an appetite suppressant due to her fondness for sweets. She has a preference for cheese and crackers.    She reports no recent urinary symptoms and continues her daily nitrofurantoin therapy.    Her mood remains stable with the use of duloxetine.    She has not consulted a neurologist regarding her memory issues in the past year. She continues to take Namenda and has annual appointments scheduled with her neurologist, the next one being in 07/2025.    She finds gabapentin beneficial for her neuropathy and wishes to continue its use. She occasionally takes it twice daily.    She was previously diagnosed with pulmonary embolism following a trip and was found to have lupus anticoagulant, which increases her risk of clotting. She has been on Xarelto since then and reports no bleeding issues.    She continues to take omeprazole for gastric acid suppression, which she finds effective.    She has discontinued iron supplementation upon completion of the prescribed course.    She has attempted CPAP therapy but found it unhelpful.    She applies lotion daily and does not believe she bathes excessively.    She has experienced a rash under her breast, which has improved with medication. She uses powder to prevent recurrence.    She reports cerumen impaction in one ear, which is affecting her hearing. The other ear is normal.    She has a history of mitral valve prolapse (MVP), which has not required management.            Objective   Vital Signs:  /72   Pulse 71   Temp 98.3 °F (36.8 °C)   Ht 160 cm (63\")   Wt 90.2 kg (198 lb 12.8 oz)   SpO2 97%  "  BMI 35.22 kg/m²     Physical Exam   Physical Exam  General: Patient is alert, in no acute distress.  Ears: Cerumen noted in one ear.  Respiratory: Lungs clear but decreased.  Gastrointestinal: Abdomen soft.  Skin: Slight pink spot under the breast.                Results  Labs   - Lupus Anticoagulant: Positive          Assessment and Plan   Diagnoses and all orders for this visit:    1. Medicare annual wellness visit, subsequent (Primary)    2. Essential hypertension  -     CBC & Differential  -     Comprehensive Metabolic Panel  -     TSH    3. Depression, unspecified depression type    4. Prediabetes  -     CBC & Differential  -     Comprehensive Metabolic Panel    5. Peripheral polyneuropathy  -     gabapentin (NEURONTIN) 100 MG capsule; Take 1 capsule by mouth 2 (Two) Times a Day.  Dispense: 180 capsule; Refill: 0    6. Chronic UTI    7. Long term (current) use of anticoagulants    8. Minimal cognitive impairment  -     Vitamin B12  -     TSH    9. Lupus anticoagulant syndrome    10. High risk medication use    11. At high risk for falls    12. Ceruminosis, left      Assessment & Plan  1. Hypertension.  - Blood pressure is well controlled at 130/72 mmHg.  - No chest pain or shortness of breath reported.  - Blood pressure medication regimen is effective.  - Continue current antihypertensive medication.    2. Weight management.  - Weight has increased a few pounds since February.  - Advised to reduce sugar intake and increase protein consumption.  - Discussed the use of low-sugar or sugar-free protein shakes to maintain muscle mass.  - No appetite suppressant prescribed; dietary changes recommended.    3. Bladder health.  - No recent bladder symptoms reported.  - Continues to take nitrofurantoin daily.  - Current treatment plan is effective.  - No changes needed in the current treatment plan.    4. Mood disorder.  - Reports stable mood.  - Currently on duloxetine.  - Mood is being managed effectively.  - No  changes needed in the current treatment plan.    5. Memory issues.  - Continues to take Namenda.  - Annual neurologist appointments, next one in July.  - Memory issues are being managed.  - No changes needed in the current treatment plan.    6. Neuropathy.  - Gabapentin is helping manage neuropathy.  - Sometimes takes gabapentin twice a day.  - Refill for gabapentin will be provided.  - If increased neuropathy symptoms occur, more frequent visits may be necessary.    7. Lupus anticoagulant syndrome.  - On Xarelto long-term due to increased risk of clotting.  - No bleeding issues reported.  - Appropriate to continue Xarelto.  - No changes needed in the current treatment plan.    8. Gastroesophageal reflux disease (GERD).  - Continues to take omeprazole, which is effective.  - B12 level test will be conducted during the next lab visit.  - GERD is being managed effectively.  - No changes needed in the current treatment plan.    9. Iron deficiency anemia.  - Completed iron therapy.  - Iron levels will be monitored to ensure no recurrence of anemia.  - Therapy was effective.  - No further iron supplementation needed at this time.    10. Sleep apnea.  - Discontinued CPAP therapy as it was not helpful.  - CPAP therapy removed from the medication list.  - No current treatment for sleep apnea.  - No further action needed at this time.    11. Dry skin.  - Advised to apply lotion daily to keep skin moist.  - Reports using lotion every day.  - Skin is dry but no acute distress noted.  - Continue current skin care regimen.    12. Intertrigo.  - Slight pink spot under the breast, but overall looks good.  - Advised to continue using antifungal powder to prevent recurrence.  - Frequent bra changes and using a blow dryer on a cool setting after showers recommended.  - No new medication prescribed; continue current management.    13. Cerumen impaction.  - Cerumen noted in one ear; the other ear is normal.  - Ear irrigation will be  performed today to remove wax buildup.  - Hearing affected by wax in one ear.  - Ear irrigation scheduled for today.    14. Health maintenance.  - Has not had a bone density scan in a couple of years and prefers to discontinue it.  - Advised to get a tetanus booster if she gets a cut.  - Recommended to get the RSV vaccine at the pharmacy.  - Discontinue routine cholesterol checks due to age and lack of heart disease history.            Follow Up   Return in about 6 months (around 12/3/2025) for Recheck.  Patient was given instructions and counseling regarding her condition or for health maintenance advice. Please see specific information pulled into the AVS if appropriate.     Patient or patient representative verbalized consent for the use of Ambient Listening during the visit with  Meredith Lea Kehrer, MD for chart documentation. 6/3/2025  15:07 EDT

## 2025-06-03 NOTE — PATIENT INSTRUCTIONS
Medicare Wellness  Personal Prevention Plan of Service     Date of Office Visit:    Encounter Provider:  Meredith Lea Kehrer, MD  Place of Service:  [facility]  Patient Name: Annalee Melara  :  1940    As part of the Medicare Wellness portion of your visit today, we are providing you with this personalized preventive plan of services (PPPS). This plan is based upon recommendations of the [place] Preventive Services Task Force (USPSTF) and the Advisory Committee on Immunization Practices (ACIP).    This lists the preventive care services that should be considered, and provides dates of when you are due. Items listed as completed are up-to-date and do not require any further intervention.    Health Maintenance   Topic Date Due    TDAP/TD VACCINES (1 - Tdap) Never done    RSV Vaccine - Adults (1 - 1-dose 75+ series) Never done    COVID-19 Vaccine (2024- season) 2025 (Originally 2025)    INFLUENZA VACCINE  2025    ANNUAL WELLNESS VISIT  2026    Pneumococcal Vaccine 50+  Completed    ZOSTER VACCINE  Completed    LIPID PANEL  Discontinued    DXA SCAN  Discontinued       Orders Placed This Encounter   Procedures    Vitamin B12     Release to patient:   Routine Release [[id number]]     [facility] Has the patient fasted?:   No    Comprehensive Metabolic Panel     Release to patient:   Routine Release [[id number]]     [facility] Has the patient fasted?:   No    TSH     Release to patient:   Routine Release [[id number]]     [facility] Has the patient fasted?:   No    CBC & Differential     Manual Differential:   No     Release to patient:   Routine Release [[id number]]     [facility] Has the patient fasted?:   No       Return in about 6 months (around 12/3/2025) for Recheck.        Fall Prevention in the Home, Adult  Falls can cause injuries and affect people of all ages. There are many simple things that you can do to make your home safe and to help prevent  falls.  If you need it, ask for help making these changes.  What actions can I take to prevent falls?  General information  Use good lighting in all rooms. Make sure to:  Replace any light bulbs that burn out.  Turn on lights if it is dark and use night-lights.  Keep items that you use often in easy-to-reach places. Lower the shelves around your home if needed.  Move furniture so that there are clear paths around it.  Do not keep throw rugs or other things on the floor that can make you trip.  If any of your floors are uneven, fix them.  Add color or contrast paint or tape to clearly beatriz and help you see:  Grab bars or handrails.  First and last steps of staircases.  Where the edge of each step is.  If you use a ladder or stepladder:  Make sure that it is fully opened. Do not climb a closed ladder.  Make sure the sides of the ladder are locked in place.  Have someone hold the ladder while you use it.  Know where your pets are as you move through your home.  What can I do in the bathroom?         Keep the floor dry. Clean up any water that is on the floor right away.  Remove soap buildup in the bathtub or shower. Buildup makes bathtubs and showers slippery.  Use non-skid mats or decals on the floor of the bathtub or shower.  Attach bath mats securely with double-sided, non-slip rug tape.  If you need to sit down while you are in the shower, use a non-slip stool.  Install grab bars by the toilet and in the bathtub and shower. Do not use towel bars as grab bars.  What can I do in the bedroom?  Make sure that you have a light by your bed that is easy to reach.  Do not use any sheets or blankets on your bed that hang to the floor.  Have a firm bench or chair with side arms that you can use for support when you get dressed.  What can I do in the kitchen?  Clean up any spills right away.  If you need to reach something above you, use a sturdy step stool that has a grab bar.  Keep electrical cables out of the way.  Do not  use floor polish or wax that makes floors slippery.  What can I do with my stairs?  Do not leave anything on the stairs.  Make sure that you have a light switch at the top and the bottom of the stairs. Have them installed if you do not have them.  Make sure that there are handrails on both sides of the stairs. Fix handrails that are broken or loose. Make sure that handrails are as long as the staircases.  Install non-slip stair treads on all stairs in your home if they do not have carpet.  Avoid having throw rugs at the top or bottom of stairs, or secure the rugs with carpet tape to prevent them from moving.  Choose a carpet design that does not hide the edge of steps on the stairs. Make sure that carpet is firmly attached to the stairs. Fix any carpet that is loose or worn.  What can I do on the outside of my home?  Use bright outdoor lighting.  Repair the edges of walkways and driveways and fix any cracks. Clear paths of anything that can make you trip, such as tools or rocks.  Add color or contrast paint or tape to clearly beatriz and help you see high doorway thresholds.  Trim any bushes or trees on the main path into your home.  Check that handrails are securely fastened and in good repair. Both sides of all steps should have handrails.  Install guardrails along the edges of any raised decks or porches.  Have leaves, snow, and ice cleared regularly. Use sand, salt, or ice melt on walkways during winter months if you live where there is ice and snow.  In the garage, clean up any spills right away, including grease or oil spills.  What other actions can I take?  Review your medicines with your health care provider. Some medicines can make you confused or feel dizzy. This can increase your chance of falling.  Wear closed-toe shoes that fit well and support your feet. Wear shoes that have rubber soles and low heels.  Use a cane, walker, scooter, or crutches that help you move around if needed.  Talk with your provider  about other ways that you can decrease your risk of falls. This may include seeing a physical therapist to learn to do exercises to improve movement and strength.  Where to find more information  Centers for Disease Control and Prevention, [place]: cdc.gov  National [place] on Aging: mauricio.nih.gov  National [place] on Aging: mauricio.nih.gov  Contact a health care provider if:  You are afraid of falling at home.  You feel weak, drowsy, or dizzy at home.  You fall at home.  Get help right away if you:  Lose consciousness or have trouble moving after a fall.  Have a fall that causes a head injury.  These symptoms may be an emergency. Get help right away. Call 911.  Do not wait to see if the symptoms will go away.  Do not drive yourself to the hospital.  This information is not intended to replace advice given to you by your health care provider. Make sure you discuss any questions you have with your health care provider.  Document Revised: 08/21/2023 Document Reviewed: 08/21/2023  Elsevier Patient Education © 2024 [employer]

## 2025-06-04 LAB
ALBUMIN SERPL-MCNC: 4 G/DL (ref 3.7–4.7)
ALP SERPL-CCNC: 132 IU/L (ref 44–121)
ALT SERPL-CCNC: 15 IU/L (ref 0–32)
AST SERPL-CCNC: 21 IU/L (ref 0–40)
BASOPHILS # BLD AUTO: 0.1 X10E3/UL (ref 0–0.2)
BASOPHILS NFR BLD AUTO: 1 %
BILIRUB SERPL-MCNC: 0.3 MG/DL (ref 0–1.2)
BUN SERPL-MCNC: 17 MG/DL (ref 8–27)
BUN/CREAT SERPL: 22 (ref 12–28)
CALCIUM SERPL-MCNC: 9.3 MG/DL (ref 8.7–10.3)
CHLORIDE SERPL-SCNC: 106 MMOL/L (ref 96–106)
CO2 SERPL-SCNC: 21 MMOL/L (ref 20–29)
CREAT SERPL-MCNC: 0.76 MG/DL (ref 0.57–1)
EGFRCR SERPLBLD CKD-EPI 2021: 77 ML/MIN/1.73
EOSINOPHIL # BLD AUTO: 0.2 X10E3/UL (ref 0–0.4)
EOSINOPHIL NFR BLD AUTO: 2 %
ERYTHROCYTE [DISTWIDTH] IN BLOOD BY AUTOMATED COUNT: 17.1 % (ref 11.7–15.4)
GLOBULIN SER CALC-MCNC: 2.5 G/DL (ref 1.5–4.5)
GLUCOSE SERPL-MCNC: 104 MG/DL (ref 70–99)
HCT VFR BLD AUTO: 45.6 % (ref 34–46.6)
HGB BLD-MCNC: 14 G/DL (ref 11.1–15.9)
IMM GRANULOCYTES # BLD AUTO: 0 X10E3/UL (ref 0–0.1)
IMM GRANULOCYTES NFR BLD AUTO: 0 %
LYMPHOCYTES # BLD AUTO: 1.7 X10E3/UL (ref 0.7–3.1)
LYMPHOCYTES NFR BLD AUTO: 25 %
MCH RBC QN AUTO: 27.9 PG (ref 26.6–33)
MCHC RBC AUTO-ENTMCNC: 30.7 G/DL (ref 31.5–35.7)
MCV RBC AUTO: 91 FL (ref 79–97)
MONOCYTES # BLD AUTO: 0.7 X10E3/UL (ref 0.1–0.9)
MONOCYTES NFR BLD AUTO: 11 %
NEUTROPHILS # BLD AUTO: 4 X10E3/UL (ref 1.4–7)
NEUTROPHILS NFR BLD AUTO: 61 %
PLATELET # BLD AUTO: 297 X10E3/UL (ref 150–450)
POTASSIUM SERPL-SCNC: 5.2 MMOL/L (ref 3.5–5.2)
PROT SERPL-MCNC: 6.5 G/DL (ref 6–8.5)
RBC # BLD AUTO: 5.02 X10E6/UL (ref 3.77–5.28)
SODIUM SERPL-SCNC: 141 MMOL/L (ref 134–144)
TSH SERPL DL<=0.005 MIU/L-ACNC: 0.69 UIU/ML (ref 0.45–4.5)
VIT B12 SERPL-MCNC: 501 PG/ML (ref 232–1245)
WBC # BLD AUTO: 6.7 X10E3/UL (ref 3.4–10.8)

## 2025-06-16 DIAGNOSIS — N39.0 CHRONIC UTI: ICD-10-CM

## 2025-06-16 DIAGNOSIS — I26.99 PULMONARY EMBOLISM, UNSPECIFIED CHRONICITY, UNSPECIFIED PULMONARY EMBOLISM TYPE, UNSPECIFIED WHETHER ACUTE COR PULMONALE PRESENT: ICD-10-CM

## 2025-06-16 RX ORDER — RIVAROXABAN 20 MG/1
20 TABLET, FILM COATED ORAL DAILY
Qty: 90 TABLET | Refills: 1 | Status: SHIPPED | OUTPATIENT
Start: 2025-06-16

## 2025-06-16 RX ORDER — NITROFURANTOIN 25; 75 MG/1; MG/1
100 CAPSULE ORAL DAILY
Qty: 90 CAPSULE | Refills: 1 | Status: SHIPPED | OUTPATIENT
Start: 2025-06-16

## 2025-06-16 NOTE — TELEPHONE ENCOUNTER
Rx Refill Note  Requested Prescriptions     Pending Prescriptions Disp Refills    Xarelto 20 MG tablet [Pharmacy Med Name: Xarelto 20 MG Oral Tablet] 90 tablet 0     Sig: Take 1 tablet by mouth once daily    nitrofurantoin, macrocrystal-monohydrate, (MACROBID) 100 MG capsule [Pharmacy Med Name: Nitrofurantoin Monohyd Macro 100 MG Oral Capsule] 90 capsule 0     Sig: Take 1 capsule by mouth once daily      Last office visit with prescribing clinician: 6/3/2025   Last telemedicine visit with prescribing clinician: Visit date not found   Next office visit with prescribing clinician: 12/3/2025                         Would you like a call back once the refill request has been completed: [] Yes [] No    If the office needs to give you a call back, can they leave a voicemail: [] Yes [] No    Gayle Marsh MA  06/16/25, 16:13 EDT     no concerns

## 2025-07-08 DIAGNOSIS — F32.A DEPRESSION, UNSPECIFIED DEPRESSION TYPE: ICD-10-CM

## 2025-07-08 RX ORDER — DULOXETIN HYDROCHLORIDE 60 MG/1
60 CAPSULE, DELAYED RELEASE ORAL DAILY
Qty: 90 CAPSULE | Refills: 1 | Status: SHIPPED | OUTPATIENT
Start: 2025-07-08

## 2025-07-08 NOTE — TELEPHONE ENCOUNTER
Rx Refill Note  Requested Prescriptions     Pending Prescriptions Disp Refills    DULoxetine (CYMBALTA) 60 MG capsule [Pharmacy Med Name: DULoxetine HCl 60 MG Oral Capsule Delayed Release Particles] 90 capsule 1     Sig: Take 1 capsule by mouth once daily      Last office visit with prescribing clinician: 6/3/2025   Last telemedicine visit with prescribing clinician: Visit date not found   Next office visit with prescribing clinician: 12/3/2025                         Would you like a call back once the refill request has been completed: [] Yes [] No    If the office needs to give you a call back, can they leave a voicemail: [] Yes [] No    Gayle Marsh MA  07/08/25, 11:17 EDT

## 2025-08-12 RX ORDER — OMEPRAZOLE 40 MG/1
40 CAPSULE, DELAYED RELEASE ORAL EVERY MORNING
Qty: 90 CAPSULE | Refills: 1 | Status: SHIPPED | OUTPATIENT
Start: 2025-08-12

## 2025-08-27 ENCOUNTER — TELEPHONE (OUTPATIENT)
Dept: FAMILY MEDICINE CLINIC | Facility: CLINIC | Age: 85
End: 2025-08-27

## 2025-08-27 ENCOUNTER — TELEPHONE (OUTPATIENT)
Dept: FAMILY MEDICINE CLINIC | Facility: CLINIC | Age: 85
End: 2025-08-27
Payer: MEDICARE

## 2025-08-27 ENCOUNTER — TELEMEDICINE (OUTPATIENT)
Dept: FAMILY MEDICINE CLINIC | Facility: CLINIC | Age: 85
End: 2025-08-27
Payer: MEDICARE

## 2025-08-27 DIAGNOSIS — R31.9 HEMATURIA, UNSPECIFIED TYPE: Primary | ICD-10-CM

## 2025-08-27 DIAGNOSIS — R30.0 DYSURIA: Primary | ICD-10-CM

## 2025-08-27 LAB
BILIRUB BLD-MCNC: ABNORMAL MG/DL
CLARITY, POC: ABNORMAL
COLOR UR: ABNORMAL
EXPIRATION DATE: ABNORMAL
GLUCOSE UR STRIP-MCNC: NEGATIVE MG/DL
KETONES UR QL: NEGATIVE
LEUKOCYTE EST, POC: ABNORMAL
Lab: ABNORMAL
NITRITE UR-MCNC: POSITIVE MG/ML
PH UR: 6 [PH] (ref 5–8)
PROT UR STRIP-MCNC: ABNORMAL MG/DL
RBC # UR STRIP: ABNORMAL /UL
SP GR UR: 1.02 (ref 1–1.03)
UROBILINOGEN UR QL: NORMAL

## 2025-08-27 PROCEDURE — 99213 OFFICE O/P EST LOW 20 MIN: CPT | Performed by: FAMILY MEDICINE

## 2025-08-27 PROCEDURE — 1126F AMNT PAIN NOTED NONE PRSNT: CPT | Performed by: FAMILY MEDICINE

## 2025-08-27 PROCEDURE — 81003 URINALYSIS AUTO W/O SCOPE: CPT | Performed by: FAMILY MEDICINE

## 2025-08-30 LAB
BACTERIA UR CULT: ABNORMAL
BACTERIA UR CULT: ABNORMAL
OTHER ANTIBIOTIC SUSC ISLT: ABNORMAL